# Patient Record
Sex: MALE | Race: WHITE | NOT HISPANIC OR LATINO | Employment: OTHER | URBAN - METROPOLITAN AREA
[De-identification: names, ages, dates, MRNs, and addresses within clinical notes are randomized per-mention and may not be internally consistent; named-entity substitution may affect disease eponyms.]

---

## 2017-04-24 ENCOUNTER — TRANSCRIBE ORDERS (OUTPATIENT)
Dept: ADMINISTRATIVE | Facility: HOSPITAL | Age: 82
End: 2017-04-24

## 2017-04-24 DIAGNOSIS — D75.89 OTHER SPECIFIED DISEASES OF BLOOD AND BLOOD-FORMING ORGANS(289.89): ICD-10-CM

## 2017-04-24 DIAGNOSIS — M81.0 AGE-RELATED OSTEOPOROSIS WITHOUT CURRENT PATHOLOGICAL FRACTURE: ICD-10-CM

## 2017-04-24 DIAGNOSIS — E83.110 HEREDITARY HEMOCHROMATOSIS (HCC): Primary | ICD-10-CM

## 2018-01-15 NOTE — MISCELLANEOUS
Message  GI Reminder Recall Judi Reynolds:   Date: 03/01/2016   Dear Joan Freeman:     Review of our records shows you are due for the following: Colonoscopy  Please call the following office to schedule your appointment:   [facility], [facility], [place], [address] [place] [address]  [phone]  We look forward to hearing from you! Sincerely,         Signatures   Electronically signed by :  Ave Acosta, ; Mar  1 2016 10:13AM EST                       (Author)

## 2021-03-25 ENCOUNTER — APPOINTMENT (OUTPATIENT)
Dept: LAB | Facility: CLINIC | Age: 86
End: 2021-03-25
Payer: COMMERCIAL

## 2021-03-25 ENCOUNTER — TRANSCRIBE ORDERS (OUTPATIENT)
Dept: ADMINISTRATIVE | Facility: HOSPITAL | Age: 86
End: 2021-03-25

## 2021-03-25 DIAGNOSIS — Z00.01 ENCOUNTER FOR GENERAL ADULT MEDICAL EXAMINATION WITH ABNORMAL FINDINGS: ICD-10-CM

## 2021-03-25 DIAGNOSIS — G20 PARKINSON'S DISEASE (HCC): Primary | ICD-10-CM

## 2021-03-25 DIAGNOSIS — G20 PARKINSON'S DISEASE (HCC): ICD-10-CM

## 2021-03-25 LAB
25(OH)D3 SERPL-MCNC: 53.7 NG/ML (ref 30–100)
ALBUMIN SERPL BCP-MCNC: 3.9 G/DL (ref 3.5–5)
ALP SERPL-CCNC: 77 U/L (ref 46–116)
ALT SERPL W P-5'-P-CCNC: 22 U/L (ref 12–78)
ANION GAP SERPL CALCULATED.3IONS-SCNC: 5 MMOL/L (ref 4–13)
AST SERPL W P-5'-P-CCNC: 23 U/L (ref 5–45)
BACTERIA UR QL AUTO: ABNORMAL /HPF
BASOPHILS # BLD AUTO: 0.04 THOUSANDS/ΜL (ref 0–0.1)
BASOPHILS NFR BLD AUTO: 1 % (ref 0–1)
BILIRUB SERPL-MCNC: 0.78 MG/DL (ref 0.2–1)
BILIRUB UR QL STRIP: NEGATIVE
BUN SERPL-MCNC: 19 MG/DL (ref 5–25)
CALCIUM SERPL-MCNC: 8.9 MG/DL (ref 8.3–10.1)
CAOX CRY URNS QL MICRO: ABNORMAL /HPF
CHLORIDE SERPL-SCNC: 106 MMOL/L (ref 100–108)
CHOLEST SERPL-MCNC: 165 MG/DL (ref 50–200)
CLARITY UR: ABNORMAL
CO2 SERPL-SCNC: 28 MMOL/L (ref 21–32)
COLOR UR: YELLOW
CREAT SERPL-MCNC: 0.83 MG/DL (ref 0.6–1.3)
EOSINOPHIL # BLD AUTO: 0.09 THOUSAND/ΜL (ref 0–0.61)
EOSINOPHIL NFR BLD AUTO: 2 % (ref 0–6)
ERYTHROCYTE [DISTWIDTH] IN BLOOD BY AUTOMATED COUNT: 12.2 % (ref 11.6–15.1)
GFR SERPL CREATININE-BSD FRML MDRD: 79 ML/MIN/1.73SQ M
GLUCOSE P FAST SERPL-MCNC: 109 MG/DL (ref 65–99)
GLUCOSE UR STRIP-MCNC: NEGATIVE MG/DL
HCT VFR BLD AUTO: 47.8 % (ref 36.5–49.3)
HDLC SERPL-MCNC: 55 MG/DL
HGB BLD-MCNC: 16.5 G/DL (ref 12–17)
HGB UR QL STRIP.AUTO: NEGATIVE
IMM GRANULOCYTES # BLD AUTO: 0.02 THOUSAND/UL (ref 0–0.2)
IMM GRANULOCYTES NFR BLD AUTO: 0 % (ref 0–2)
KETONES UR STRIP-MCNC: NEGATIVE MG/DL
LDLC SERPL CALC-MCNC: 93 MG/DL (ref 0–100)
LEUKOCYTE ESTERASE UR QL STRIP: NEGATIVE
LYMPHOCYTES # BLD AUTO: 1.54 THOUSANDS/ΜL (ref 0.6–4.47)
LYMPHOCYTES NFR BLD AUTO: 26 % (ref 14–44)
MAGNESIUM SERPL-MCNC: 2.3 MG/DL (ref 1.6–2.6)
MCH RBC QN AUTO: 33.7 PG (ref 26.8–34.3)
MCHC RBC AUTO-ENTMCNC: 34.5 G/DL (ref 31.4–37.4)
MCV RBC AUTO: 98 FL (ref 82–98)
MONOCYTES # BLD AUTO: 0.67 THOUSAND/ΜL (ref 0.17–1.22)
MONOCYTES NFR BLD AUTO: 11 % (ref 4–12)
MUCOUS THREADS UR QL AUTO: ABNORMAL
NEUTROPHILS # BLD AUTO: 3.63 THOUSANDS/ΜL (ref 1.85–7.62)
NEUTS SEG NFR BLD AUTO: 60 % (ref 43–75)
NITRITE UR QL STRIP: NEGATIVE
NON-SQ EPI CELLS URNS QL MICRO: ABNORMAL /HPF
NONHDLC SERPL-MCNC: 110 MG/DL
NRBC BLD AUTO-RTO: 0 /100 WBCS
PH UR STRIP.AUTO: 6 [PH]
PLATELET # BLD AUTO: 228 THOUSANDS/UL (ref 149–390)
PMV BLD AUTO: 10 FL (ref 8.9–12.7)
POTASSIUM SERPL-SCNC: 4.1 MMOL/L (ref 3.5–5.3)
PROT SERPL-MCNC: 7.2 G/DL (ref 6.4–8.2)
PROT UR STRIP-MCNC: NEGATIVE MG/DL
PSA SERPL-MCNC: 2.4 NG/ML (ref 0–4)
PTH-INTACT SERPL-MCNC: 36.3 PG/ML (ref 18.4–80.1)
RBC # BLD AUTO: 4.9 MILLION/UL (ref 3.88–5.62)
RBC #/AREA URNS AUTO: ABNORMAL /HPF
SODIUM SERPL-SCNC: 139 MMOL/L (ref 136–145)
SP GR UR STRIP.AUTO: 1.02 (ref 1–1.03)
TRIGL SERPL-MCNC: 86 MG/DL
TSH SERPL DL<=0.05 MIU/L-ACNC: 2.14 UIU/ML (ref 0.36–3.74)
URATE SERPL-MCNC: 5.5 MG/DL (ref 4.2–8)
UROBILINOGEN UR QL STRIP.AUTO: 0.2 E.U./DL
WBC # BLD AUTO: 5.99 THOUSAND/UL (ref 4.31–10.16)
WBC #/AREA URNS AUTO: ABNORMAL /HPF

## 2021-03-25 PROCEDURE — 81001 URINALYSIS AUTO W/SCOPE: CPT | Performed by: INTERNAL MEDICINE

## 2021-03-25 PROCEDURE — 84550 ASSAY OF BLOOD/URIC ACID: CPT

## 2021-03-25 PROCEDURE — 80053 COMPREHEN METABOLIC PANEL: CPT

## 2021-03-25 PROCEDURE — G0103 PSA SCREENING: HCPCS

## 2021-03-25 PROCEDURE — 85025 COMPLETE CBC W/AUTO DIFF WBC: CPT

## 2021-03-25 PROCEDURE — 80061 LIPID PANEL: CPT

## 2021-03-25 PROCEDURE — 82306 VITAMIN D 25 HYDROXY: CPT

## 2021-03-25 PROCEDURE — 83970 ASSAY OF PARATHORMONE: CPT

## 2021-03-25 PROCEDURE — 84443 ASSAY THYROID STIM HORMONE: CPT

## 2021-03-25 PROCEDURE — 36415 COLL VENOUS BLD VENIPUNCTURE: CPT

## 2021-03-25 PROCEDURE — 83735 ASSAY OF MAGNESIUM: CPT

## 2022-02-11 ENCOUNTER — HOSPITAL ENCOUNTER (EMERGENCY)
Facility: HOSPITAL | Age: 87
Discharge: HOME | End: 2022-02-11
Attending: EMERGENCY MEDICINE
Payer: COMMERCIAL

## 2022-02-11 ENCOUNTER — APPOINTMENT (EMERGENCY)
Dept: RADIOLOGY | Facility: HOSPITAL | Age: 87
End: 2022-02-11
Attending: EMERGENCY MEDICINE
Payer: COMMERCIAL

## 2022-02-11 VITALS
RESPIRATION RATE: 20 BRPM | SYSTOLIC BLOOD PRESSURE: 158 MMHG | DIASTOLIC BLOOD PRESSURE: 77 MMHG | WEIGHT: 141 LBS | HEIGHT: 70 IN | BODY MASS INDEX: 20.19 KG/M2 | HEART RATE: 75 BPM | TEMPERATURE: 98.7 F | OXYGEN SATURATION: 94 %

## 2022-02-11 DIAGNOSIS — R23.0 PERIPHERAL CYANOSIS: ICD-10-CM

## 2022-02-11 DIAGNOSIS — R60.0 EDEMA OF LEFT LOWER EXTREMITY: Primary | ICD-10-CM

## 2022-02-11 DIAGNOSIS — I82.501 CHRONIC DEEP VEIN THROMBOSIS (DVT) OF RIGHT LOWER EXTREMITY (HCC): ICD-10-CM

## 2022-02-11 LAB
ALBUMIN SERPL BCP-MCNC: 3.6 G/DL (ref 3.5–5)
ALP SERPL-CCNC: 73 U/L (ref 46–116)
ALT SERPL W P-5'-P-CCNC: 13 U/L (ref 12–78)
ANION GAP SERPL CALCULATED.3IONS-SCNC: 9 MMOL/L (ref 4–13)
APTT PPP: 35 SECONDS (ref 23–37)
AST SERPL W P-5'-P-CCNC: 20 U/L (ref 5–45)
BASOPHILS # BLD AUTO: 0.04 THOUSANDS/ΜL (ref 0–0.1)
BASOPHILS NFR BLD AUTO: 1 % (ref 0–1)
BILIRUB SERPL-MCNC: 0.7 MG/DL (ref 0.2–1)
BUN SERPL-MCNC: 18 MG/DL (ref 5–25)
CALCIUM SERPL-MCNC: 8.4 MG/DL (ref 8.3–10.1)
CHLORIDE SERPL-SCNC: 103 MMOL/L (ref 100–108)
CO2 SERPL-SCNC: 27 MMOL/L (ref 21–32)
CREAT SERPL-MCNC: 0.82 MG/DL (ref 0.6–1.3)
EOSINOPHIL # BLD AUTO: 0.12 THOUSAND/ΜL (ref 0–0.61)
EOSINOPHIL NFR BLD AUTO: 2 % (ref 0–6)
ERYTHROCYTE [DISTWIDTH] IN BLOOD BY AUTOMATED COUNT: 12.5 % (ref 11.6–15.1)
GFR SERPL CREATININE-BSD FRML MDRD: 78 ML/MIN/1.73SQ M
GLUCOSE SERPL-MCNC: 93 MG/DL (ref 65–140)
HCT VFR BLD AUTO: 45.3 % (ref 36.5–49.3)
HGB BLD-MCNC: 15.5 G/DL (ref 12–17)
IMM GRANULOCYTES # BLD AUTO: 0.02 THOUSAND/UL (ref 0–0.2)
IMM GRANULOCYTES NFR BLD AUTO: 0 % (ref 0–2)
INR PPP: 1.03 (ref 0.84–1.19)
LYMPHOCYTES # BLD AUTO: 1.53 THOUSANDS/ΜL (ref 0.6–4.47)
LYMPHOCYTES NFR BLD AUTO: 26 % (ref 14–44)
MCH RBC QN AUTO: 33.2 PG (ref 26.8–34.3)
MCHC RBC AUTO-ENTMCNC: 34.2 G/DL (ref 31.4–37.4)
MCV RBC AUTO: 97 FL (ref 82–98)
MONOCYTES # BLD AUTO: 0.63 THOUSAND/ΜL (ref 0.17–1.22)
MONOCYTES NFR BLD AUTO: 11 % (ref 4–12)
NEUTROPHILS # BLD AUTO: 3.51 THOUSANDS/ΜL (ref 1.85–7.62)
NEUTS SEG NFR BLD AUTO: 60 % (ref 43–75)
NRBC BLD AUTO-RTO: 0 /100 WBCS
PLATELET # BLD AUTO: 184 THOUSANDS/UL (ref 149–390)
PMV BLD AUTO: 9.5 FL (ref 8.9–12.7)
POTASSIUM SERPL-SCNC: 4.4 MMOL/L (ref 3.5–5.3)
PROT SERPL-MCNC: 7.3 G/DL (ref 6.4–8.2)
PROTHROMBIN TIME: 13.3 SECONDS (ref 11.6–14.5)
RBC # BLD AUTO: 4.67 MILLION/UL (ref 3.88–5.62)
SODIUM SERPL-SCNC: 139 MMOL/L (ref 136–145)
WBC # BLD AUTO: 5.85 THOUSAND/UL (ref 4.31–10.16)

## 2022-02-11 PROCEDURE — 99285 EMERGENCY DEPT VISIT HI MDM: CPT | Performed by: EMERGENCY MEDICINE

## 2022-02-11 PROCEDURE — 36415 COLL VENOUS BLD VENIPUNCTURE: CPT | Performed by: EMERGENCY MEDICINE

## 2022-02-11 PROCEDURE — 93970 EXTREMITY STUDY: CPT

## 2022-02-11 PROCEDURE — 80053 COMPREHEN METABOLIC PANEL: CPT | Performed by: EMERGENCY MEDICINE

## 2022-02-11 PROCEDURE — 85025 COMPLETE CBC W/AUTO DIFF WBC: CPT | Performed by: EMERGENCY MEDICINE

## 2022-02-11 PROCEDURE — 85610 PROTHROMBIN TIME: CPT | Performed by: EMERGENCY MEDICINE

## 2022-02-11 PROCEDURE — 99284 EMERGENCY DEPT VISIT MOD MDM: CPT

## 2022-02-11 PROCEDURE — 85730 THROMBOPLASTIN TIME PARTIAL: CPT | Performed by: EMERGENCY MEDICINE

## 2022-02-11 PROCEDURE — 93970 EXTREMITY STUDY: CPT | Performed by: SURGERY

## 2022-02-11 NOTE — ED PROVIDER NOTES
History  Chief Complaint   Patient presents with    Leg Swelling     L leg sweeling started a week ago  denies any SOB     Patient here with complaint of left lower extremity swelling that began a week ago  He denies shortness of breath or trauma  Denies a history of DVTs  History provided by:  Patient   used: No    Leg Pain  Location:  Leg  Injury: no    Leg location:  L leg  Pain details:     Quality:  Throbbing    Radiates to:  Does not radiate    Severity:  Mild    Onset quality:  Sudden    Timing:  Constant    Progression:  Worsening  Chronicity:  New  Dislocation: no    Foreign body present:  No foreign bodies  Tetanus status:  Unknown  Prior injury to area:  No  Relieved by:  Nothing  Worsened by:  Nothing  Ineffective treatments:  None tried  Associated symptoms: no back pain and no fever        Prior to Admission Medications   Prescriptions Last Dose Informant Patient Reported? Taking?   carbidopa-levodopa (SINEMET)  mg per tablet   Yes Yes   Sig: Take 1 tablet by mouth 4 (four) times a day      Facility-Administered Medications: None       Past Medical History:   Diagnosis Date    Glaucoma     Parkinson disease ([facility] [address] )        History reviewed  No pertinent surgical history  History reviewed  No pertinent family history  I have reviewed and agree with the history as documented  E-Cigarette/Vaping    E-Cigarette Use Never User      E-Cigarette/Vaping Substances     Social History     Tobacco Use    Smoking status: Former Smoker    Smokeless tobacco: Never Used   Vaping Use    Vaping Use: Never used   Substance Use Topics    Alcohol use: Yes    Drug use: Never       Review of Systems   Constitutional: Negative for chills and fever  Respiratory: Negative for cough, shortness of breath and wheezing  Cardiovascular: Negative for chest pain and palpitations  Gastrointestinal: Negative for abdominal pain, constipation, diarrhea, nausea and vomiting  Genitourinary: Negative for dysuria, flank pain, hematuria and urgency  Musculoskeletal: Positive for gait problem  Negative for back pain, joint swelling and myalgias  Skin: Positive for color change and pallor  Negative for rash  All other systems reviewed and are negative  Physical Exam              Physical Exam  Vitals and nursing note reviewed  Constitutional:       Appearance: Normal appearance  He is well-developed  HENT:      Head: Normocephalic and atraumatic  Eyes:      Pupils: Pupils are equal, round, and reactive to light  Cardiovascular:      Rate and Rhythm: Normal rate and regular rhythm  Heart sounds: Normal heart sounds  Pulmonary:      Effort: Pulmonary effort is normal       Breath sounds: Normal breath sounds  Abdominal:      General: Bowel sounds are normal  There is no distension  Palpations: Abdomen is soft  There is no mass  Tenderness: There is no abdominal tenderness  There is no guarding or rebound  Musculoskeletal:         General: No swelling, tenderness, deformity or signs of injury  Cervical back: Normal range of motion and neck supple  Right lower leg: Normal       Left lower leg: No deformity, lacerations or tenderness  3+ Pitting Edema present  Legs:    Skin:     General: Skin is warm and dry  Capillary Refill: Capillary refill takes less than 2 seconds  Coloration: Skin is cyanotic  Skin is not ashen, jaundiced, mottled, pale or sallow  Neurological:      General: No focal deficit present  Mental Status: He is alert and oriented to person, place, and time  Psychiatric:         Behavior: Behavior normal          Thought Content:  Thought content normal          Judgment: Judgment normal          Vital Signs  ED Triage Vitals   Temperature Pulse Respirations Blood Pressure SpO2   02/11/22 1122 02/11/22 1122 02/11/22 1122 02/11/22 1122 02/11/22 1125   (!) 97 °F (36 1 °C) 71 20 161/71 96 %      Temp Source Heart Rate Source Patient Position - Orthostatic VS BP Location FiO2 (%)   02/11/22 1122 02/11/22 1122 02/11/22 1122 02/11/22 1122 --   Temporal Monitor Sitting Left arm       Pain Score       02/11/22 1122       3           Vitals:    02/11/22 1122 02/11/22 1510   BP: 161/71 158/77   Pulse: 71 75   Patient Position - Orthostatic VS: Sitting Sitting         Visual Acuity  Visual Acuity      Most Recent Value   L Pupil Size (mm) 3   R Pupil Size (mm) 3          ED Medications  Medications - No data to display    Diagnostic Studies  Results Reviewed     Procedure Component Value Units Date/Time    Comprehensive metabolic panel [[id number]] Collected: 02/11/22 1229    Lab Status: Final result Specimen: Blood from Arm, Left Updated: 02/11/22 1252     Sodium 139 mmol/L      Potassium 4 4 mmol/L      Chloride 103 mmol/L      CO2 27 mmol/L      ANION GAP 9 mmol/L      BUN 18 mg/dL      Creatinine 0 82 mg/dL      Glucose 93 mg/dL      Calcium 8 4 mg/dL      AST 20 U/L      ALT 13 U/L      Alkaline Phosphatase 73 U/L      Total Protein 7 3 g/dL      Albumin 3 6 g/dL      Total Bilirubin 0 70 mg/dL      eGFR 78 ml/min/1 73sq m     Narrative:      [place] guidelines for Chronic Kidney Disease (CKD):     Stage 1 with normal or high GFR (GFR > 90 mL/min/1 73 square meters)    Stage 2 Mild CKD (GFR = 60-89 mL/min/1 73 square meters)    Stage 3A Moderate CKD (GFR = 45-59 mL/min/1 73 square meters)    Stage 3B Moderate CKD (GFR = 30-44 mL/min/1 73 square meters)    Stage 4 Severe CKD (GFR = 15-29 mL/min/1 73 square meters)    Stage 5 End Stage CKD (GFR <15 mL/min/1 73 square meters)  Note: GFR calculation is accurate only with a steady state creatinine    Protime-INR [[id number]]  (Normal) Collected: 02/11/22 1229    Lab Status: Final result Specimen: Blood from Arm, Left Updated: 02/11/22 1249     Protime 13 3 seconds      INR 1 03    APTT [[id number]]  (Normal) Collected: 02/11/22 1229    Lab Status: Final result Specimen: Blood from Arm, Left Updated: 02/11/22 1249     PTT 35 seconds     CBC and differential [[id number]] Collected: 02/11/22 1229    Lab Status: Final result Specimen: Blood from Arm, Left Updated: 02/11/22 1235     WBC 5 85 Thousand/uL      RBC 4 67 Million/uL      Hemoglobin 15 5 g/dL      Hematocrit 45 3 %      MCV 97 fL      MCH 33 2 pg      MCHC 34 2 g/dL      RDW 12 5 %      MPV 9 5 fL      Platelets 995 Thousands/uL      nRBC 0 /100 WBCs      Neutrophils Relative 60 %      Immat GRANS % 0 %      Lymphocytes Relative 26 %      Monocytes Relative 11 %      Eosinophils Relative 2 %      Basophils Relative 1 %      Neutrophils Absolute 3 51 Thousands/µL      Immature Grans Absolute 0 02 Thousand/uL      Lymphocytes Absolute 1 53 Thousands/µL      Monocytes Absolute 0 63 Thousand/µL      Eosinophils Absolute 0 12 Thousand/µL      Basophils Absolute 0 04 Thousands/µL                  VAS lower limb venous duplex study, complete bilateral   Final Result by Tommy Singh MD (02/11 2030)                 Procedures  Procedures         ED Course  ED Course as of 02/13/22 1608   Fri Feb 11, 2022   1358 Preliminary vascular study negative for DVT left lower extremity  Chronic DVTs in right lower extremity  SBIRT 22yo+      Most Recent Value   SBIRT (24 yo +)    In order to provide better care to our patients, we are screening all of our patients for alcohol and drug use  Would it be okay to ask you these screening questions? No Filed at: 02/11/2022 1139                    MDM  Number of Diagnoses or Management Options  Chronic deep vein thrombosis (DVT) of right lower extremity ([address] ): new and requires workup  Edema of left lower extremity: new and requires workup  Peripheral cyanosis: new and requires workup  Diagnosis management comments: Pt in the ER with c/o LLE edema and cyanosis  Venous doppler neg for DVT in LLE, positive for chronic DVT on right   I reviewed pt with vasc surg PA on call, Toney Curtis  She states that vasc surgeon does not recommend anticoag at this time  Pt will f/u with vasc after discharge  He will return to the ER for further concerns  Amount and/or Complexity of Data Reviewed  Clinical lab tests: ordered and reviewed  Tests in the radiology section of CPT®: ordered and reviewed    Risk of Complications, Morbidity, and/or Mortality  Presenting problems: high  Diagnostic procedures: high  Management options: high    Patient Progress  Patient progress: stable      Disposition  Final diagnoses:   Edema of left lower extremity   Peripheral cyanosis   Chronic deep vein thrombosis (DVT) of right lower extremity ([address] )     Time reflects when diagnosis was documented in both MDM as applicable and the Disposition within this note     Time User Action Codes Description Comment    2/11/2022  2:01 PM Sofía Walters O Add [R60 0] Edema of left lower extremity     2/11/2022  2:01 PM Sofía Walters O Add [R23 0] Peripheral cyanosis     2/11/2022  2:01 PM Sofía Walters Add [I82 501] Chronic deep vein thrombosis (DVT) of right lower extremity [facility])       ED Disposition     ED Disposition Condition Date/Time Comment    Discharge Stable Fri Feb 11, 2022  2:51 PM [place] discharge to home/self care  Follow-up Information     Follow up With Specialties Details Why Contact Info Additional Information    Gloria Jones MD Internal Medicine Schedule an appointment as soon as possible for a visit in 2 days for follow up Bari Silva [address] [place] [id number]  [phone]       [facility] Vascular Surgery Schedule an appointment as soon as possible for a visit in 2 days for follow up [address] [facility] [address] [facility] [address] [phone] The [address] [place] [address], One [address], [address] [phone]          Discharge Medication List as of 2/11/2022  2:52 PM      CONTINUE these medications which have NOT CHANGED    Details   carbidopa-levodopa (SINEMET)  mg per tablet Take 1 tablet by mouth 4 (four) times a day, Starting Mon 5/17/2021, Until Tue 5/17/2022, Historical Med             No discharge procedures on file      PDMP Review     None          ED Provider  Electronically Signed by           Candido Darby DO  02/13/22 7834

## 2022-02-11 NOTE — ED NOTES
Called Vascular to make sure they saw the order for this patient       Shayy Gonzalez RN  02/11/22 3314

## 2022-02-11 NOTE — DISCHARGE INSTRUCTIONS
Return to the ER for further concerns or worsening symptoms  Follow up with your primary care physician and vascular center in 1-2 days

## 2022-02-18 ENCOUNTER — OFFICE VISIT (OUTPATIENT)
Dept: VASCULAR SURGERY | Facility: CLINIC | Age: 87
End: 2022-02-18
Payer: COMMERCIAL

## 2022-02-18 VITALS
SYSTOLIC BLOOD PRESSURE: 122 MMHG | BODY MASS INDEX: 19.23 KG/M2 | HEART RATE: 68 BPM | DIASTOLIC BLOOD PRESSURE: 72 MMHG | HEIGHT: 71 IN | WEIGHT: 137.4 LBS

## 2022-02-18 DIAGNOSIS — I82.551 CHRONIC DEEP VEIN THROMBOSIS (DVT) OF RIGHT PERONEAL VEIN (HCC): ICD-10-CM

## 2022-02-18 DIAGNOSIS — I87.2 VENOUS INSUFFICIENCY OF BOTH LOWER EXTREMITIES: Primary | ICD-10-CM

## 2022-02-18 DIAGNOSIS — I73.9 PERIPHERAL VASCULAR DISEASE, UNSPECIFIED (HCC): ICD-10-CM

## 2022-02-18 PROBLEM — I82.509 CHRONIC DEEP VEIN THROMBOSIS (DVT) (HCC): Status: ACTIVE | Noted: 2022-02-18

## 2022-02-18 PROCEDURE — 99204 OFFICE O/P NEW MOD 45 MIN: CPT | Performed by: PHYSICIAN ASSISTANT

## 2022-02-18 RX ORDER — DORZOLAMIDE HCL 20 MG/ML
SOLUTION/ DROPS OPHTHALMIC
COMMUNITY
Start: 2022-01-25

## 2022-02-18 RX ORDER — PRAMIPEXOLE DIHYDROCHLORIDE 0.12 MG/1
TABLET ORAL
COMMUNITY
Start: 2021-11-10

## 2022-02-18 RX ORDER — TAMSULOSIN HYDROCHLORIDE 0.4 MG/1
0.4 CAPSULE ORAL
COMMUNITY
Start: 2021-11-17 | End: 2022-11-17

## 2022-02-18 NOTE — PROGRESS NOTES
Assessment/Plan:    Bilateral lower extremity edema L>R    3 weeks of marked LE edema   Reviewed ED notes 2/11   Suspect bilat LE edema due to underlying venous disease     Exam:      -scattered LE bulging, varicose veins     -R 1+ pedal, ankle edema; L 2 + pedal and ankle edema, 1+ LE edema     -Purple discoloration of feet which improve/ resolve on elevation     -feet are warm, well perfused, DP pulses     LEV 2/11/22: RLE chronic DVT 1/2 peroneal veins  LLE no DVT  Triphasic waveforms bilaterally    Plan: We had a detailed discussion regarding his history and symptoms  Patient with varicose veins and more recently less active with Parkinson's  He has no known cardiovascular or renal disease  His LE edema and discoloration of the feet are consistent with venous disease  Arterial examination is grossly normal  Recommend conservative measures with compression, periodic elevation of legs, low Na diet and activity as tolerated  - Order for prescription graded compression stockings of 20-30 mm Hg  - Patient education for venous insufficiency  - Follow up as needed for any worsening of symptoms      Chronic RLE DVT   discovered on venous duplex   Patient had no known hx of DVT   No indication for anticoagulation at this point      Subjective:      Patient ID: Sukumar Perry is a 80 y o  male  Pt is new and was referred by Claude Daring, DO for leg swelling and to review LEV 2/11/2022  Pt was at [facility] ED 2/11/2022  Pt c/oedema and discoloration of feet for 3 weeks  He denies tiredness and heaviness in leg  HPI    Mr Jolayne Fleischer 80year-old M recently at [facility] for evaluation of left lower extremity edema  He underwent a venous duplex which showed chronic DVT of the right lower extremity  Case was discussed with the vascular team at that time and anticoagulation was not recommended due to chronic nature of DVT    Patient now comes in to the vascular clinic for further evaluation  Mr Estrellita Cerda is concerned for bilateral L>R LE /ankle/pedal edema with purple discoloration which began 3 weeks ago  He also has some numbness to the plantar aspect of the feet  He has no calf pain  No coldness in the feet  No claudication, ischemic rest pain or wounds  He denies chronic history of LE edema; though, on further discussion, he has had swelling in the past which he attributes to medications  He had no known history of DVT  In the past year, he started treatment for Parkinson's and he is now less active  He ambulates with a cane and has had no change in walking  He has no CP or SOB  LEV 2/11/22  [facility] REPORT  CLINICAL:  Indications: Patient presents with Left lower extremity pain and swelling x  several days  Risk Factors  The patient has history of previous smoking (quit >10yrs ago)  He has no  history of DVT  FINDINGS:     Right     Impression                             Peroneal  E1  Non Occlusive Thrombus (Chronic)             CONCLUSION:  Impression:  RIGHT LOWER LIMB:  Evidence of  chronic deep vein thrombosis in one peroneal vein  No evidence of superficial thrombophlebitis noted  Doppler evaluation shows a normal response to augmentation maneuvers  Popliteal, posterior tibial and anterior tibial arterial Doppler waveforms are  triphasic  LEFT LOWER LIMB:  No evidence of acute or chronic deep vein thrombosis  No evidence of superficial thrombophlebitis noted  Doppler evaluation shows a normal response to augmentation maneuvers  Popliteal, posterior tibial and anterior tibial arterial Doppler waveforms are  Triphasic  The following portions of the patient's history were reviewed and updated as appropriate: allergies, current medications, past family history, past medical history, past social history, past surgical history and problem list     Review of Systems   Constitutional: Negative  HENT: Negative  Eyes: Negative      Respiratory: Negative  Cardiovascular: Positive for leg swelling (left leg swelling )  Gastrointestinal: Negative  Endocrine: Negative  Genitourinary: Negative  Musculoskeletal: Negative  Skin: Positive for color change (redness in left foot)  Allergic/Immunologic: Negative  Neurological: Negative  Hematological: Negative  Psychiatric/Behavioral: Negative  Objective:      /72 (BP Location: Right arm, Patient Position: Sitting, Cuff Size: Standard)   Pulse 68   Ht 5' 10 5" (1 791 m)   Wt 62 3 kg (137 lb 6 4 oz)   BMI 19 44 kg/m²            -scattered LE bulging, varicose veins in the medial lower legs     -R 1+ pedal, ankle edema; L 2 + pedal and ankle edema, 1+ LE edema     -Purple discoloration of feet which improve/ resolve on elevation     -feet are warm, well perfused, DP pulses       Physical Exam  Vitals and nursing note reviewed  Constitutional:       Appearance: He is well-developed  Comments: Elderly gentleman  Uses cane to walk   HENT:      Head: Normocephalic and atraumatic  Eyes:      Pupils: Pupils are equal, round, and reactive to light  Neck:      Thyroid: No thyromegaly  Vascular: No JVD  Trachea: Trachea normal    Cardiovascular:      Rate and Rhythm: Normal rate and regular rhythm  Pulses:           Carotid pulses are 2+ on the right side and 2+ on the left side  Radial pulses are 2+ on the right side and 2+ on the left side  Femoral pulses are 2+ on the right side and 2+ on the left side  Dorsalis pedis pulses are 2+ on the right side and 2+ on the left side  Heart sounds: S1 normal and S2 normal  Murmur heard  Systolic murmur is present with a grade of 2/6  No friction rub  No gallop  Pulmonary:      Effort: Pulmonary effort is normal  No accessory muscle usage or respiratory distress  Breath sounds: Normal breath sounds  No wheezing or rales     Abdominal:      General: Bowel sounds are normal  There is no distension  Palpations: Abdomen is soft  Tenderness: There is no abdominal tenderness  Musculoskeletal:         General: No deformity  Normal range of motion  Cervical back: Neck supple  Right lower leg: Edema present  Left lower leg: Edema present  Skin:     General: Skin is warm and dry  Findings: No lesion or rash  Nails: There is no clubbing  Neurological:      Mental Status: He is alert and oriented to person, place, and time  Comments: Grossly normal    Psychiatric:         Behavior: Behavior is cooperative  I have reviewed and made appropriate changes to the review of systems input by the medical assistant  Vitals:    02/18/22 1520   BP: 122/72   BP Location: Right arm   Patient Position: Sitting   Cuff Size: Standard   Pulse: 68   Weight: 62 3 kg (137 lb 6 4 oz)   Height: 5' 10 5" (1 791 m)       There is no problem list on file for this patient  No past surgical history on file  No family history on file  Social History     Socioeconomic History    Marital status: /Civil Union     Spouse name: Not on file    Number of children: Not on file    Years of education: Not on file    Highest education level: Not on file   Occupational History    Not on file   Tobacco Use    Smoking status: Former Smoker    Smokeless tobacco: Never Used   Vaping Use    Vaping Use: Never used   Substance and Sexual Activity    Alcohol use:  Yes    Drug use: Never    Sexual activity: Not on file   Other Topics Concern    Not on file   Social History Narrative    Not on file     Social Determinants of Health     Financial Resource Strain: Not on file   Food Insecurity: Not on file   Transportation Needs: Not on file   Physical Activity: Not on file   Stress: Not on file   Social Connections: Not on file   Intimate Partner Violence: Not on file   Housing Stability: Not on file       No Known Allergies      Current Outpatient Medications:   carbidopa-levodopa (SINEMET)  mg per tablet, Take 1 tablet by mouth 4 (four) times a day, Disp: , Rfl:     dorzolamide (TRUSOPT) 2 % ophthalmic solution, , Disp: , Rfl:     pramipexole (MIRAPEX) 0 125 mg tablet, TAKE ONE TABLET BY MOUTH THREE TIMES A DAY (GENERIC FOR MIRAPLEX), Disp: , Rfl:     tamsulosin (FLOMAX) 0 4 mg, Take 0 4 mg by mouth, Disp: , Rfl:

## 2022-02-18 NOTE — PATIENT INSTRUCTIONS
Bilateral lower extremity edema L>R   -varicose veins  -bilateral varicose veins  -feet are warm, well perfused, DP pulses    Plan:  - Order for prescription graded compression stockings of 20-30 mm Hg  - Wear stocking EVERY day to help control swelling in legs and remove at night  - Elevate legs while at rest  - Continue healthy life-style changes; heart-healthy, low sodium diet; regular exercise for weight loss  - Patient education for venous insufficiency  - Follow up as needed for any worsening of symptoms - swelling, pain, fatigue, aching, heaviness

## 2022-11-07 ENCOUNTER — APPOINTMENT (OUTPATIENT)
Dept: LAB | Facility: CLINIC | Age: 87
End: 2022-11-07

## 2022-11-07 DIAGNOSIS — R73.01 IMPAIRED FASTING GLUCOSE: ICD-10-CM

## 2022-11-07 DIAGNOSIS — N13.8 ENLARGED PROSTATE WITH URINARY OBSTRUCTION: ICD-10-CM

## 2022-11-07 DIAGNOSIS — R35.1 NOCTURIA: ICD-10-CM

## 2022-11-07 DIAGNOSIS — N40.1 ENLARGED PROSTATE WITH URINARY OBSTRUCTION: ICD-10-CM

## 2022-11-07 LAB
ALBUMIN SERPL BCP-MCNC: 3.3 G/DL (ref 3.5–5)
ALP SERPL-CCNC: 67 U/L (ref 46–116)
ALT SERPL W P-5'-P-CCNC: 15 U/L (ref 12–78)
ANION GAP SERPL CALCULATED.3IONS-SCNC: 3 MMOL/L (ref 4–13)
AST SERPL W P-5'-P-CCNC: 15 U/L (ref 5–45)
BASOPHILS # BLD AUTO: 0.05 THOUSANDS/ÂΜL (ref 0–0.1)
BASOPHILS NFR BLD AUTO: 1 % (ref 0–1)
BILIRUB SERPL-MCNC: 0.81 MG/DL (ref 0.2–1)
BUN SERPL-MCNC: 16 MG/DL (ref 5–25)
CALCIUM ALBUM COR SERPL-MCNC: 9.6 MG/DL (ref 8.3–10.1)
CALCIUM SERPL-MCNC: 9 MG/DL (ref 8.3–10.1)
CHLORIDE SERPL-SCNC: 106 MMOL/L (ref 96–108)
CO2 SERPL-SCNC: 28 MMOL/L (ref 21–32)
CREAT SERPL-MCNC: 0.86 MG/DL (ref 0.6–1.3)
EOSINOPHIL # BLD AUTO: 0.22 THOUSAND/ÂΜL (ref 0–0.61)
EOSINOPHIL NFR BLD AUTO: 3 % (ref 0–6)
ERYTHROCYTE [DISTWIDTH] IN BLOOD BY AUTOMATED COUNT: 12.6 % (ref 11.6–15.1)
EST. AVERAGE GLUCOSE BLD GHB EST-MCNC: 114 MG/DL
GFR SERPL CREATININE-BSD FRML MDRD: 76 ML/MIN/1.73SQ M
GLUCOSE P FAST SERPL-MCNC: 115 MG/DL (ref 65–99)
HBA1C MFR BLD: 5.6 %
HCT VFR BLD AUTO: 45.6 % (ref 36.5–49.3)
HGB BLD-MCNC: 15.6 G/DL (ref 12–17)
IMM GRANULOCYTES # BLD AUTO: 0.02 THOUSAND/UL (ref 0–0.2)
IMM GRANULOCYTES NFR BLD AUTO: 0 % (ref 0–2)
LYMPHOCYTES # BLD AUTO: 1.85 THOUSANDS/ÂΜL (ref 0.6–4.47)
LYMPHOCYTES NFR BLD AUTO: 27 % (ref 14–44)
MAGNESIUM SERPL-MCNC: 2 MG/DL (ref 1.6–2.6)
MCH RBC QN AUTO: 33.6 PG (ref 26.8–34.3)
MCHC RBC AUTO-ENTMCNC: 34.2 G/DL (ref 31.4–37.4)
MCV RBC AUTO: 98 FL (ref 82–98)
MONOCYTES # BLD AUTO: 0.71 THOUSAND/ÂΜL (ref 0.17–1.22)
MONOCYTES NFR BLD AUTO: 10 % (ref 4–12)
NEUTROPHILS # BLD AUTO: 4.05 THOUSANDS/ÂΜL (ref 1.85–7.62)
NEUTS SEG NFR BLD AUTO: 59 % (ref 43–75)
NRBC BLD AUTO-RTO: 0 /100 WBCS
PLATELET # BLD AUTO: 219 THOUSANDS/UL (ref 149–390)
PMV BLD AUTO: 9.7 FL (ref 8.9–12.7)
POTASSIUM SERPL-SCNC: 4.2 MMOL/L (ref 3.5–5.3)
PROT SERPL-MCNC: 7.1 G/DL (ref 6.4–8.4)
RBC # BLD AUTO: 4.64 MILLION/UL (ref 3.88–5.62)
SODIUM SERPL-SCNC: 137 MMOL/L (ref 135–147)
WBC # BLD AUTO: 6.9 THOUSAND/UL (ref 4.31–10.16)

## 2024-02-07 ENCOUNTER — APPOINTMENT (OUTPATIENT)
Dept: LAB | Facility: CLINIC | Age: 89
End: 2024-02-07
Payer: COMMERCIAL

## 2024-02-07 DIAGNOSIS — Z12.5 ENCOUNTER FOR SCREENING FOR MALIGNANT NEOPLASM OF PROSTATE: ICD-10-CM

## 2024-02-07 DIAGNOSIS — G20.A1 PARKINSON'S DISEASE WITHOUT DYSKINESIA, WITHOUT MENTION OF FLUCTUATIONS: ICD-10-CM

## 2024-02-07 DIAGNOSIS — R19.7 DIARRHEA, UNSPECIFIED: ICD-10-CM

## 2024-02-07 DIAGNOSIS — N40.1 BENIGN PROSTATIC HYPERPLASIA WITH LOWER URINARY TRACT SYMPTOMS: ICD-10-CM

## 2024-02-07 DIAGNOSIS — R79.89 OTHER SPECIFIED ABNORMAL FINDINGS OF BLOOD CHEMISTRY: ICD-10-CM

## 2024-02-07 DIAGNOSIS — R35.0 FREQUENCY OF MICTURITION: ICD-10-CM

## 2024-02-07 LAB
ALBUMIN SERPL BCP-MCNC: 3.8 G/DL (ref 3.5–5)
ALP SERPL-CCNC: 55 U/L (ref 34–104)
ALT SERPL W P-5'-P-CCNC: 4 U/L (ref 7–52)
ANION GAP SERPL CALCULATED.3IONS-SCNC: 10 MMOL/L
AST SERPL W P-5'-P-CCNC: 27 U/L (ref 13–39)
BACTERIA UR QL AUTO: ABNORMAL /HPF
BASOPHILS # BLD AUTO: 0.02 THOUSANDS/ÂΜL (ref 0–0.1)
BASOPHILS NFR BLD AUTO: 0 % (ref 0–1)
BILIRUB SERPL-MCNC: 1.13 MG/DL (ref 0.2–1)
BILIRUB UR QL STRIP: NEGATIVE
BUN SERPL-MCNC: 21 MG/DL (ref 5–25)
CALCIUM SERPL-MCNC: 8.8 MG/DL (ref 8.4–10.2)
CAOX CRY URNS QL MICRO: ABNORMAL /HPF
CHLORIDE SERPL-SCNC: 103 MMOL/L (ref 96–108)
CHOLEST SERPL-MCNC: 124 MG/DL
CLARITY UR: CLEAR
CO2 SERPL-SCNC: 26 MMOL/L (ref 21–32)
COLOR UR: YELLOW
CREAT SERPL-MCNC: 0.8 MG/DL (ref 0.6–1.3)
EOSINOPHIL # BLD AUTO: 0.09 THOUSAND/ÂΜL (ref 0–0.61)
EOSINOPHIL NFR BLD AUTO: 2 % (ref 0–6)
ERYTHROCYTE [DISTWIDTH] IN BLOOD BY AUTOMATED COUNT: 12 % (ref 11.6–15.1)
GFR SERPL CREATININE-BSD FRML MDRD: 78 ML/MIN/1.73SQ M
GLUCOSE P FAST SERPL-MCNC: 92 MG/DL (ref 65–99)
GLUCOSE UR STRIP-MCNC: NEGATIVE MG/DL
HCT VFR BLD AUTO: 42.3 % (ref 36.5–49.3)
HDLC SERPL-MCNC: 46 MG/DL
HGB BLD-MCNC: 15.2 G/DL (ref 12–17)
HGB UR QL STRIP.AUTO: NEGATIVE
IMM GRANULOCYTES # BLD AUTO: 0.03 THOUSAND/UL (ref 0–0.2)
IMM GRANULOCYTES NFR BLD AUTO: 1 % (ref 0–2)
KETONES UR STRIP-MCNC: NEGATIVE MG/DL
LDLC SERPL CALC-MCNC: 63 MG/DL (ref 0–100)
LEUKOCYTE ESTERASE UR QL STRIP: NEGATIVE
LYMPHOCYTES # BLD AUTO: 1.14 THOUSANDS/ÂΜL (ref 0.6–4.47)
LYMPHOCYTES NFR BLD AUTO: 20 % (ref 14–44)
MAGNESIUM SERPL-MCNC: 1.7 MG/DL (ref 1.9–2.7)
MCH RBC QN AUTO: 34 PG (ref 26.8–34.3)
MCHC RBC AUTO-ENTMCNC: 35.9 G/DL (ref 31.4–37.4)
MCV RBC AUTO: 95 FL (ref 82–98)
MONOCYTES # BLD AUTO: 0.83 THOUSAND/ÂΜL (ref 0.17–1.22)
MONOCYTES NFR BLD AUTO: 15 % (ref 4–12)
MUCOUS THREADS UR QL AUTO: ABNORMAL
NEUTROPHILS # BLD AUTO: 3.57 THOUSANDS/ÂΜL (ref 1.85–7.62)
NEUTS SEG NFR BLD AUTO: 62 % (ref 43–75)
NITRITE UR QL STRIP: NEGATIVE
NON-SQ EPI CELLS URNS QL MICRO: ABNORMAL /HPF
NONHDLC SERPL-MCNC: 78 MG/DL
NRBC BLD AUTO-RTO: 0 /100 WBCS
PH UR STRIP.AUTO: 6 [PH]
PLATELET # BLD AUTO: 166 THOUSANDS/UL (ref 149–390)
PMV BLD AUTO: 9.8 FL (ref 8.9–12.7)
POTASSIUM SERPL-SCNC: 4 MMOL/L (ref 3.5–5.3)
PROT SERPL-MCNC: 6.4 G/DL (ref 6.4–8.4)
PROT UR STRIP-MCNC: ABNORMAL MG/DL
PSA SERPL-MCNC: 1.88 NG/ML (ref 0–4)
RBC # BLD AUTO: 4.47 MILLION/UL (ref 3.88–5.62)
RBC #/AREA URNS AUTO: ABNORMAL /HPF
SODIUM SERPL-SCNC: 139 MMOL/L (ref 135–147)
SP GR UR STRIP.AUTO: 1.02 (ref 1–1.03)
TRIGL SERPL-MCNC: 75 MG/DL
TSH SERPL DL<=0.05 MIU/L-ACNC: 2.16 UIU/ML (ref 0.45–4.5)
UROBILINOGEN UR STRIP-ACNC: <2 MG/DL
WBC # BLD AUTO: 5.68 THOUSAND/UL (ref 4.31–10.16)
WBC #/AREA URNS AUTO: ABNORMAL /HPF

## 2024-02-07 PROCEDURE — G0103 PSA SCREENING: HCPCS

## 2024-02-07 PROCEDURE — 84443 ASSAY THYROID STIM HORMONE: CPT

## 2024-02-07 PROCEDURE — 36415 COLL VENOUS BLD VENIPUNCTURE: CPT

## 2024-02-07 PROCEDURE — 83735 ASSAY OF MAGNESIUM: CPT

## 2024-02-07 PROCEDURE — 87086 URINE CULTURE/COLONY COUNT: CPT

## 2024-02-07 PROCEDURE — 80053 COMPREHEN METABOLIC PANEL: CPT

## 2024-02-07 PROCEDURE — 85025 COMPLETE CBC W/AUTO DIFF WBC: CPT

## 2024-02-07 PROCEDURE — 81001 URINALYSIS AUTO W/SCOPE: CPT

## 2024-02-07 PROCEDURE — 80061 LIPID PANEL: CPT

## 2024-02-08 LAB — BACTERIA UR CULT: NORMAL

## 2024-02-09 ENCOUNTER — APPOINTMENT (OUTPATIENT)
Dept: LAB | Facility: CLINIC | Age: 89
End: 2024-02-09
Payer: COMMERCIAL

## 2024-02-09 PROCEDURE — 87209 SMEAR COMPLEX STAIN: CPT

## 2024-02-09 PROCEDURE — 87329 GIARDIA AG IA: CPT

## 2024-02-09 PROCEDURE — 87177 OVA AND PARASITES SMEARS: CPT

## 2024-02-09 PROCEDURE — 87505 NFCT AGENT DETECTION GI: CPT

## 2024-02-09 PROCEDURE — 87493 C DIFF AMPLIFIED PROBE: CPT

## 2024-02-10 LAB
C COLI+JEJUNI TUF STL QL NAA+PROBE: POSITIVE
C DIFF TOX GENS STL QL NAA+PROBE: NEGATIVE
EC STX1+STX2 GENES STL QL NAA+PROBE: NEGATIVE
G LAMBLIA AG STL QL IA: NEGATIVE
SALMONELLA SP SPAO STL QL NAA+PROBE: NEGATIVE
SHIGELLA SP+EIEC IPAH STL QL NAA+PROBE: NEGATIVE

## 2024-07-11 ENCOUNTER — APPOINTMENT (EMERGENCY)
Dept: RADIOLOGY | Facility: HOSPITAL | Age: 89
DRG: 871 | End: 2024-07-11
Payer: COMMERCIAL

## 2024-07-11 ENCOUNTER — APPOINTMENT (INPATIENT)
Dept: RADIOLOGY | Facility: HOSPITAL | Age: 89
DRG: 871 | End: 2024-07-11
Attending: RADIOLOGY
Payer: COMMERCIAL

## 2024-07-11 ENCOUNTER — RA CDI HCC (OUTPATIENT)
Dept: OTHER | Facility: HOSPITAL | Age: 89
End: 2024-07-11

## 2024-07-11 ENCOUNTER — HOSPITAL ENCOUNTER (INPATIENT)
Facility: HOSPITAL | Age: 89
LOS: 5 days | Discharge: NURSING, REHAB OR LONG-TERM CARE | DRG: 871 | End: 2024-07-16
Attending: EMERGENCY MEDICINE | Admitting: SPECIALIST
Payer: COMMERCIAL

## 2024-07-11 DIAGNOSIS — E44.0 MODERATE PROTEIN-CALORIE MALNUTRITION (HCC): ICD-10-CM

## 2024-07-11 DIAGNOSIS — K82.A2 CHOLECYSTITIS WITH PERFORATION OF GALLBLADDER: Primary | ICD-10-CM

## 2024-07-11 DIAGNOSIS — K65.3: ICD-10-CM

## 2024-07-11 DIAGNOSIS — R10.9 ABDOMINAL PAIN: ICD-10-CM

## 2024-07-11 LAB
ALBUMIN SERPL BCG-MCNC: 2.9 G/DL (ref 3.5–5)
ALP SERPL-CCNC: 53 U/L (ref 34–104)
ALT SERPL W P-5'-P-CCNC: <3 U/L (ref 7–52)
ANION GAP SERPL CALCULATED.3IONS-SCNC: 9 MMOL/L (ref 4–13)
AST SERPL W P-5'-P-CCNC: 19 U/L (ref 13–39)
BACTERIA UR QL AUTO: ABNORMAL /HPF
BASOPHILS # BLD MANUAL: 0 THOUSAND/UL (ref 0–0.1)
BASOPHILS NFR MAR MANUAL: 0 % (ref 0–1)
BILIRUB SERPL-MCNC: 1.11 MG/DL (ref 0.2–1)
BILIRUB UR QL STRIP: NEGATIVE
BUN SERPL-MCNC: 70 MG/DL (ref 5–25)
BURR CELLS BLD QL SMEAR: PRESENT
CALCIUM ALBUM COR SERPL-MCNC: 10.3 MG/DL (ref 8.3–10.1)
CALCIUM SERPL-MCNC: 9.4 MG/DL (ref 8.4–10.2)
CHLORIDE SERPL-SCNC: 102 MMOL/L (ref 96–108)
CLARITY UR: CLEAR
CO2 SERPL-SCNC: 25 MMOL/L (ref 21–32)
COLOR UR: YELLOW
CREAT SERPL-MCNC: 1.02 MG/DL (ref 0.6–1.3)
DOHLE BOD BLD QL SMEAR: PRESENT
EOSINOPHIL # BLD MANUAL: 0 THOUSAND/UL (ref 0–0.4)
EOSINOPHIL NFR BLD MANUAL: 0 % (ref 0–6)
ERYTHROCYTE [DISTWIDTH] IN BLOOD BY AUTOMATED COUNT: 13.1 % (ref 11.6–15.1)
GFR SERPL CREATININE-BSD FRML MDRD: 63 ML/MIN/1.73SQ M
GLUCOSE SERPL-MCNC: 114 MG/DL (ref 65–140)
GLUCOSE UR STRIP-MCNC: NEGATIVE MG/DL
HCT VFR BLD AUTO: 41.3 % (ref 36.5–49.3)
HGB BLD-MCNC: 14.3 G/DL (ref 12–17)
HGB UR QL STRIP.AUTO: ABNORMAL
HYALINE CASTS #/AREA URNS LPF: ABNORMAL /LPF
KETONES UR STRIP-MCNC: NEGATIVE MG/DL
LEUKOCYTE ESTERASE UR QL STRIP: ABNORMAL
LIPASE SERPL-CCNC: <6 U/L (ref 11–82)
LYMPHOCYTES # BLD AUTO: 0.25 THOUSAND/UL (ref 0.6–4.47)
LYMPHOCYTES # BLD AUTO: 1 % (ref 14–44)
MCH RBC QN AUTO: 33.6 PG (ref 26.8–34.3)
MCHC RBC AUTO-ENTMCNC: 34.6 G/DL (ref 31.4–37.4)
MCV RBC AUTO: 97 FL (ref 82–98)
MONOCYTES # BLD AUTO: 0.38 THOUSAND/UL (ref 0–1.22)
MONOCYTES NFR BLD: 3 % (ref 4–12)
NEUTROPHILS # BLD MANUAL: 12.03 THOUSAND/UL (ref 1.85–7.62)
NEUTS BAND NFR BLD MANUAL: 13 % (ref 0–8)
NEUTS SEG NFR BLD AUTO: 82 % (ref 43–75)
NITRITE UR QL STRIP: NEGATIVE
NON-SQ EPI CELLS URNS QL MICRO: ABNORMAL /HPF
PH UR STRIP.AUTO: 5.5 [PH]
PLATELET # BLD AUTO: 154 THOUSANDS/UL (ref 149–390)
PLATELET BLD QL SMEAR: ADEQUATE
PMV BLD AUTO: 10.6 FL (ref 8.9–12.7)
POLYCHROMASIA BLD QL SMEAR: PRESENT
POTASSIUM SERPL-SCNC: 3.7 MMOL/L (ref 3.5–5.3)
PROT SERPL-MCNC: 5.9 G/DL (ref 6.4–8.4)
PROT UR STRIP-MCNC: ABNORMAL MG/DL
RBC # BLD AUTO: 4.26 MILLION/UL (ref 3.88–5.62)
RBC #/AREA URNS AUTO: ABNORMAL /HPF
RBC MORPH BLD: PRESENT
SODIUM SERPL-SCNC: 136 MMOL/L (ref 135–147)
SP GR UR STRIP.AUTO: 1.02 (ref 1–1.03)
TOXIC GRANULES BLD QL SMEAR: PRESENT
UROBILINOGEN UR STRIP-ACNC: <2 MG/DL
VARIANT LYMPHS # BLD AUTO: 1 %
WBC # BLD AUTO: 12.66 THOUSAND/UL (ref 4.31–10.16)
WBC #/AREA URNS AUTO: ABNORMAL /HPF

## 2024-07-11 PROCEDURE — 87205 SMEAR GRAM STAIN: CPT | Performed by: SPECIALIST

## 2024-07-11 PROCEDURE — 0F9430Z DRAINAGE OF GALLBLADDER WITH DRAINAGE DEVICE, PERCUTANEOUS APPROACH: ICD-10-PCS | Performed by: SPECIALIST

## 2024-07-11 PROCEDURE — 85027 COMPLETE CBC AUTOMATED: CPT | Performed by: EMERGENCY MEDICINE

## 2024-07-11 PROCEDURE — C1729 CATH, DRAINAGE: HCPCS

## 2024-07-11 PROCEDURE — 81001 URINALYSIS AUTO W/SCOPE: CPT | Performed by: EMERGENCY MEDICINE

## 2024-07-11 PROCEDURE — 83690 ASSAY OF LIPASE: CPT | Performed by: EMERGENCY MEDICINE

## 2024-07-11 PROCEDURE — C1769 GUIDE WIRE: HCPCS

## 2024-07-11 PROCEDURE — 99285 EMERGENCY DEPT VISIT HI MDM: CPT

## 2024-07-11 PROCEDURE — 87070 CULTURE OTHR SPECIMN AEROBIC: CPT | Performed by: SPECIALIST

## 2024-07-11 PROCEDURE — 96361 HYDRATE IV INFUSION ADD-ON: CPT

## 2024-07-11 PROCEDURE — NC001 PR NO CHARGE: Performed by: RADIOLOGY

## 2024-07-11 PROCEDURE — 47490 INCISION OF GALLBLADDER: CPT | Performed by: RADIOLOGY

## 2024-07-11 PROCEDURE — 87075 CULTR BACTERIA EXCEPT BLOOD: CPT | Performed by: SPECIALIST

## 2024-07-11 PROCEDURE — 87186 SC STD MICRODIL/AGAR DIL: CPT | Performed by: SPECIALIST

## 2024-07-11 PROCEDURE — 80053 COMPREHEN METABOLIC PANEL: CPT | Performed by: EMERGENCY MEDICINE

## 2024-07-11 PROCEDURE — 49406 IMAGE CATH FLUID PERI/RETRO: CPT | Performed by: RADIOLOGY

## 2024-07-11 PROCEDURE — 93005 ELECTROCARDIOGRAM TRACING: CPT

## 2024-07-11 PROCEDURE — 36415 COLL VENOUS BLD VENIPUNCTURE: CPT | Performed by: EMERGENCY MEDICINE

## 2024-07-11 PROCEDURE — 99223 1ST HOSP IP/OBS HIGH 75: CPT | Performed by: SPECIALIST

## 2024-07-11 PROCEDURE — 99285 EMERGENCY DEPT VISIT HI MDM: CPT | Performed by: EMERGENCY MEDICINE

## 2024-07-11 PROCEDURE — 85007 BL SMEAR W/DIFF WBC COUNT: CPT | Performed by: EMERGENCY MEDICINE

## 2024-07-11 PROCEDURE — 96374 THER/PROPH/DIAG INJ IV PUSH: CPT

## 2024-07-11 PROCEDURE — 10030 IMG GID FLU COLL DRG SFT TIS: CPT

## 2024-07-11 PROCEDURE — 74177 CT ABD & PELVIS W/CONTRAST: CPT

## 2024-07-11 PROCEDURE — 87077 CULTURE AEROBIC IDENTIFY: CPT | Performed by: SPECIALIST

## 2024-07-11 RX ORDER — HEPARIN SODIUM 5000 [USP'U]/ML
5000 INJECTION, SOLUTION INTRAVENOUS; SUBCUTANEOUS EVERY 8 HOURS SCHEDULED
Status: DISCONTINUED | OUTPATIENT
Start: 2024-07-11 | End: 2024-07-16 | Stop reason: HOSPADM

## 2024-07-11 RX ORDER — PRAMIPEXOLE DIHYDROCHLORIDE 0.25 MG/1
0.12 TABLET ORAL 3 TIMES DAILY
Status: DISCONTINUED | OUTPATIENT
Start: 2024-07-11 | End: 2024-07-16 | Stop reason: HOSPADM

## 2024-07-11 RX ORDER — ACETAMINOPHEN 325 MG/1
650 TABLET ORAL EVERY 6 HOURS PRN
Status: DISCONTINUED | OUTPATIENT
Start: 2024-07-11 | End: 2024-07-16 | Stop reason: HOSPADM

## 2024-07-11 RX ORDER — ONDANSETRON 2 MG/ML
4 INJECTION INTRAMUSCULAR; INTRAVENOUS EVERY 6 HOURS PRN
Status: DISCONTINUED | OUTPATIENT
Start: 2024-07-11 | End: 2024-07-16 | Stop reason: HOSPADM

## 2024-07-11 RX ORDER — CALCIUM CARBONATE 500 MG/1
1000 TABLET, CHEWABLE ORAL DAILY PRN
Status: DISCONTINUED | OUTPATIENT
Start: 2024-07-11 | End: 2024-07-16 | Stop reason: HOSPADM

## 2024-07-11 RX ORDER — SODIUM CHLORIDE 9 MG/ML
100 INJECTION, SOLUTION INTRAVENOUS CONTINUOUS
Status: DISCONTINUED | OUTPATIENT
Start: 2024-07-11 | End: 2024-07-13

## 2024-07-11 RX ORDER — SODIUM CHLORIDE 9 MG/ML
150 INJECTION, SOLUTION INTRAVENOUS CONTINUOUS
Status: DISCONTINUED | OUTPATIENT
Start: 2024-07-11 | End: 2024-07-11

## 2024-07-11 RX ADMIN — PRAMIPEXOLE DIHYDROCHLORIDE 0.12 MG: 0.25 TABLET ORAL at 21:50

## 2024-07-11 RX ADMIN — SODIUM CHLORIDE 150 ML/HR: 0.9 INJECTION, SOLUTION INTRAVENOUS at 16:56

## 2024-07-11 RX ADMIN — HEPARIN SODIUM 5000 UNITS: 5000 INJECTION INTRAVENOUS; SUBCUTANEOUS at 21:50

## 2024-07-11 RX ADMIN — SODIUM CHLORIDE 150 ML/HR: 0.9 INJECTION, SOLUTION INTRAVENOUS at 09:26

## 2024-07-11 RX ADMIN — IOHEXOL 100 ML: 350 INJECTION, SOLUTION INTRAVENOUS at 10:26

## 2024-07-11 RX ADMIN — CARBIDOPA AND LEVODOPA 1 TABLET: 25; 100 TABLET ORAL at 21:50

## 2024-07-11 RX ADMIN — PIPERACILLIN AND TAZOBACTAM 4.5 G: 4; .5 INJECTION, POWDER, FOR SOLUTION INTRAVENOUS at 13:25

## 2024-07-11 RX ADMIN — SODIUM CHLORIDE 100 ML/HR: 0.9 INJECTION, SOLUTION INTRAVENOUS at 18:42

## 2024-07-11 RX ADMIN — PIPERACILLIN AND TAZOBACTAM 4.5 G: 4; .5 INJECTION, POWDER, FOR SOLUTION INTRAVENOUS at 19:55

## 2024-07-11 RX ADMIN — MORPHINE SULFATE 2 MG: 2 INJECTION, SOLUTION INTRAMUSCULAR; INTRAVENOUS at 12:06

## 2024-07-11 NOTE — DISCHARGE INSTRUCTIONS
[facility] Interventional Radiology Tube Care Instructions                               [facility] Interventional Radiology # [phone]      Care after your procedure:    1. The properly functioning catheter should be flushed daily with 10ml of normal saline using clean technique, unless otherwise instructed by your physician.    2. The drainage bag or bulb may be emptied daily or as necessary with clean technique.      Record output daily.     3.  Change dressing daily over tube insertion site.    4.  As the tube is secured to the skin with only a suture, tub baths are not permitted.         Showers are permitted if the patient's skin entry is prevented from getting wet. Cover        Site and drain with saran wrap when showering.      Notify the Interventional Radiologist if you have any of the followin. Leakage of large amounts of liquid around catheter.    2. Fever of 101 degrees     3. Decreased drainage which may be associated with pressure or pain.    4. Catheter pulled back or falls out.    5. Schedule your follow up appointment if none has been given to you.

## 2024-07-11 NOTE — BRIEF OP NOTE (RAD/CATH)
INTERVENTIONAL RADIOLOGY PROCEDURE NOTE    Date: 7/11/2024    Procedure:   Perihepatic biloma drain placement  Cholecystostomy tube placement  Procedure Summary       Date:  Room / Location:     Anesthesia Start:  Anesthesia Stop:     Procedure:  Diagnosis:     Scheduled Providers:  Responsible Provider:     Anesthesia Type: Not recorded ASA Status: Not recorded            Preoperative diagnosis:   1. Cholecystitis with perforation of gallbladder    2. Abdominal pain    3. Bile peritonitis (HCC)         Postoperative diagnosis: Same.    Surgeon: Amado Bermudez MD     Assistant: None. No qualified resident was available.    Blood loss: 1 mL    Specimens:   20 mL bile aspirate from perihepatic biloma drain sent to lab for cultures.  20 mL dark bile aspirate from cholecystostomy tube sent to lab for cultures.    Findings:   Successful placement of 10 Fr catheter into perihepatic biloma.  Successful 10 Fr cholecystostomy tube placement.    Complications: None immediate.    Anesthesia: local

## 2024-07-11 NOTE — CONSULTS
e-Consult (IPC)  - Interventional Radiology  Edwin Elkins 89 y.o. male MRN: [id number]  Unit/Bed#: ED 05 Encounter: [id number]          Interventional Radiology has been consulted to evaluate Edwin Elkins    Consults  07/11/24    Assessment/Recommendation:   89 year old male with history of glaucoma, Parkinson disease, presents to ED with right abdominal pain for the past 3 weeks.  CT shows ruptured gallbladder with perihepatic fluid collection concerning for bile leak.    - Discussed with Surgery that placement of cholecystostomy tube would be high risk given the decompressed nature from perforation.  If patient and family are willing to accept the increased risks for complications, plan for rosalba tube placement.  - Plan for perihepatic drain placement.  - NPO.  - Hold blood thinners.      11-20 minutes, >50% of the total time devoted to medical consultative verbal/EMR discussion between providers. Written report will be generated in the EMR.     Thank you for allowing Interventional Radiology to participate in the care of Edwin Elkins. Please don't hesitate to call or TigerText us with any questions.     Amado Bermudez MD

## 2024-07-11 NOTE — SEDATION DOCUMENTATION
Procedure ended , 10fr abscess drain placed to bulb, 10 fr rosalba tube placed to bag drainage. Pt tolerated with local only. Vss pt to room in stable condition, bedside report given.

## 2024-07-11 NOTE — H&P
H&P Exam - General Surgery   Edwin Elkins 89 y.o. male MRN: [id number]  Unit/Bed#: ED 05 Encounter: [id number]    Assessment & Plan     Assessment:  89 year-old-male with a pmhx of Parkinson's disease presents with abdominal pain x3 weeks and refusing to eat for the past couple days. Imaging revealed acute perforated calculus cholecystitis with perihepatic fluid collection.   WBC count 12   T bili is 1.1, otherwise LFTs are within normal limits    Plan:  Admit to surgical service  IR to place a jesse-hepatic drain and attempt to place drain into GB fossa as well  Start Zosyn   Clinically stable currently, consider surgical intervention with cholecystectomy and wash out if patient worsens.  NPO at least until IR procedure complete  Patients son, Clovis, can be reached at [phone]        History of Present Illness   History provided primarily by patient's wife at bedside     HPI:  Edwin Elkins is a 89 y.o. male with a pmhx of Parkinson's disease and glaucoma who presents with 3 weeks of decrease appetite, difficulty tolerating oral intake, and abdominal pain. His wife states she was concerned about his lack of appetite so she gave him probiotics which did not resolve his symptoms. Imaging studies revealed a perforated acute calculus cholecystitis with perihepatic fluid collection. The patient's wife was hesitant to make a decision regarding surgery without being accompanied by her son who was on his way.       Review of Systems   Constitutional:  Positive for appetite change. Negative for chills, diaphoresis, fatigue and fever.   Respiratory:  Negative for cough, chest tightness, shortness of breath and wheezing.    Cardiovascular:  Negative for chest pain and palpitations.   Gastrointestinal:  Positive for abdominal pain. Negative for abdominal distention, blood in stool, constipation, diarrhea, nausea and vomiting.   Genitourinary:  Negative for dysuria.   Neurological: Negative.        Historical Information  "  Past Medical History:   Diagnosis Date    Glaucoma     Parkinson disease      History reviewed. No pertinent surgical history.  Social History   Social History     Substance and Sexual Activity   Alcohol Use Yes     Social History     Substance and Sexual Activity   Drug Use Never     Social History     Tobacco Use   Smoking Status Former   Smokeless Tobacco Never     E-Cigarette/Vaping    E-Cigarette Use Never User      E-Cigarette/Vaping Substances     Family History: History reviewed. No pertinent family history.    Meds/Allergies   PTA meds:   Prior to Admission Medications   Prescriptions Last Dose Informant Patient Reported? Taking?   carbidopa-levodopa (SINEMET)  mg per tablet 7/11/2024 at 0700 Self Yes Yes   Sig: Take 1 tablet by mouth 3 (three) times a day   dorzolamide (TRUSOPT) 2 % ophthalmic solution 7/10/2024 Spouse/Significant Other Yes Yes   pramipexole (MIRAPEX) 0.125 mg tablet  Spouse/Significant Other Yes No   Sig: TAKE ONE TABLET BY MOUTH THREE TIMES A DAY (GENERIC FOR MIRAPLEX)   tamsulosin (FLOMAX) 0.4 mg  Spouse/Significant Other Yes No   Sig: Take 0.4 mg by mouth      Facility-Administered Medications: None     No Known Allergies    Objective   First Vitals:   Blood Pressure: 112/61 (07/11/24 0909)  Pulse: 82 (07/11/24 0909)  Temperature: 97.9 °F (36.6 °C) (07/11/24 0909)  Temp Source: Oral (07/11/24 0909)  Respirations: (!) 24 (07/11/24 0909)  Height: 5' 10.5\" (179.1 cm) (07/11/24 0909)  Weight - Scale: 58.4 kg (128 lb 12 oz) (07/11/24 0909)  SpO2: 92 % (07/11/24 0909)    Current Vitals:   Blood Pressure: 120/65 (07/11/24 1300)  Pulse: 79 (07/11/24 1300)  Temperature: 97.9 °F (36.6 °C) (07/11/24 0909)  Temp Source: Oral (07/11/24 0909)  Respirations: 22 (07/11/24 1245)  Height: 5' 10.5\" (179.1 cm) (07/11/24 0909)  Weight - Scale: 58.4 kg (128 lb 12 oz) (07/11/24 0909)  SpO2: 94 % (07/11/24 1300)    No intake or output data in the 24 hours ending 07/11/24 1357    Invasive Devices       " Peripheral Intravenous Line  Duration             Peripheral IV 07/11/24 Proximal;Right;Ventral (anterior) Forearm <1 day                    Physical Exam  Constitutional:       General: He is awake. He is not in acute distress.     Appearance: Normal appearance. He is underweight. He is ill-appearing. He is not toxic-appearing or diaphoretic.      Comments: Cachectic    HENT:      Head: Normocephalic and atraumatic.   Eyes:      General: No scleral icterus.     Conjunctiva/sclera: Conjunctivae normal.      Pupils: Pupils are equal, round, and reactive to light.   Cardiovascular:      Rate and Rhythm: Normal rate and regular rhythm.      Heart sounds: Normal heart sounds. No murmur heard.  Pulmonary:      Effort: Pulmonary effort is normal. No tachypnea, bradypnea or respiratory distress.      Breath sounds: Normal breath sounds. No stridor. No wheezing, rhonchi or rales.   Abdominal:      General: Abdomen is flat. Bowel sounds are normal. There is no distension.      Palpations: Abdomen is soft. There is no mass.      Tenderness: There is no abdominal tenderness. There is no guarding or rebound.      Hernia: No hernia is present.   Musculoskeletal:      Right lower leg: No edema.      Left lower leg: No edema.   Skin:     General: Skin is warm and dry.      Coloration: Skin is pale. Skin is not cyanotic or mottled.   Neurological:      Mental Status: He is alert. Mental status is at baseline.      GCS: GCS eye subscore is 4. GCS verbal subscore is 5. GCS motor subscore is 6.      Motor: Atrophy present.      Comments: Baseline tremor and abnormal facies secondary to parkinson's   Psychiatric:         Mood and Affect: Affect is blunt.         Speech: Speech normal.         Behavior: Behavior is slowed.         Lab Results: CBC:   Lab Results   Component Value Date    WBC 12.66 (H) 07/11/2024    HGB 14.3 07/11/2024    HCT 41.3 07/11/2024    MCV 97 07/11/2024     07/11/2024    RBC 4.26 07/11/2024    MCH 33.6  07/11/2024    MCHC 34.6 07/11/2024    RDW 13.1 07/11/2024    MPV 10.6 07/11/2024   , CMP:   Lab Results   Component Value Date    SODIUM 136 07/11/2024    K 3.7 07/11/2024     07/11/2024    CO2 25 07/11/2024    BUN 70 (H) 07/11/2024    CREATININE 1.02 07/11/2024    CALCIUM 9.4 07/11/2024    AST 19 07/11/2024    ALT <3 (L) 07/11/2024    ALKPHOS 53 07/11/2024    EGFR 63 07/11/2024     Imaging: I have personally reviewed pertinent reports.    EKG, Pathology, and Other Studies: I have personally reviewed pertinent reports.      Code Status: No Order  Advance Directive and Living Will:      Power of :    POLST:

## 2024-07-11 NOTE — ED PROVIDER NOTES
History  Chief Complaint   Patient presents with    Abdominal Pain     Pt arrives from home via EMS c/o RLQ pain and loss of appetite for the last three weeks. No nausea, vomiting, diarrhea. No fevers.      Patient is an elderly gentleman has been complaining of pain in the lower abdomen right greater than left for approximately 3 weeks.  Patient states the pain has been intermittent not associated with food intake.  He is experiencing decreased appetite.  Denies any bowel changes.  No fever or chills no dysuria or hematuria.  He has tried Pepto-Bismol without relief.  He states he is here at the behest of his wife        Prior to Admission Medications   Prescriptions Last Dose Informant Patient Reported? Taking?   carbidopa-levodopa (SINEMET)  mg per tablet 7/11/2024 at 0700 Self Yes Yes   Sig: Take 1 tablet by mouth 3 (three) times a day   dorzolamide (TRUSOPT) 2 % ophthalmic solution 7/10/2024 Spouse/Significant Other Yes Yes   pramipexole (MIRAPEX) 0.125 mg tablet  Spouse/Significant Other Yes No   Sig: TAKE ONE TABLET BY MOUTH THREE TIMES A DAY (GENERIC FOR MIRAPLEX)   tamsulosin (FLOMAX) 0.4 mg  Spouse/Significant Other Yes No   Sig: Take 0.4 mg by mouth      Facility-Administered Medications: None       Past Medical History:   Diagnosis Date    Glaucoma     Parkinson disease        History reviewed. No pertinent surgical history.    History reviewed. No pertinent family history.  I have reviewed and agree with the history as documented.    E-Cigarette/Vaping    E-Cigarette Use Never User      E-Cigarette/Vaping Substances     Social History     Tobacco Use    Smoking status: Former    Smokeless tobacco: Never   Vaping Use    Vaping status: Never Used   Substance Use Topics    Alcohol use: Yes    Drug use: Never       Review of Systems   Constitutional:  Positive for appetite change. Negative for chills and fever.   HENT:  Negative for congestion and sore throat.    Eyes:  Negative for visual  disturbance.   Respiratory:  Negative for shortness of breath.    Cardiovascular:  Negative for chest pain.   Gastrointestinal:  Positive for abdominal pain. Negative for abdominal distention, blood in stool, diarrhea, nausea and vomiting.   Genitourinary:  Negative for decreased urine volume, dysuria and flank pain.   Musculoskeletal:  Negative for back pain.   Skin:  Negative for color change and rash.   Neurological:  Negative for weakness, light-headedness and headaches.   Hematological:  Does not bruise/bleed easily.   Psychiatric/Behavioral:  Negative for confusion.    All other systems reviewed and are negative.      Physical Exam  Physical Exam  Vitals and nursing note reviewed.   Constitutional:       Appearance: He is well-developed.   HENT:      Head: Normocephalic.      Mouth/Throat:      Mouth: Mucous membranes are moist.   Eyes:      Extraocular Movements: Extraocular movements intact.   Cardiovascular:      Rate and Rhythm: Normal rate and regular rhythm.   Pulmonary:      Effort: Pulmonary effort is normal.   Abdominal:      General: Abdomen is flat. Bowel sounds are normal.      Palpations: Abdomen is soft.      Tenderness: There is abdominal tenderness in the right lower quadrant, suprapubic area and left lower quadrant. There is no guarding. Negative signs include Burch's sign and McBurney's sign.   Skin:     General: Skin is warm and dry.      Capillary Refill: Capillary refill takes less than 2 seconds.   Neurological:      General: No focal deficit present.      Mental Status: He is alert and oriented to person, place, and time.   Psychiatric:         Mood and Affect: Mood normal.         Behavior: Behavior normal.         Vital Signs  ED Triage Vitals [07/11/24 0909]   Temperature Pulse Respirations Blood Pressure SpO2   97.9 °F (36.6 °C) 82 (!) 24 112/61 92 %      Temp Source Heart Rate Source Patient Position - Orthostatic VS BP Location FiO2 (%)   Oral Monitor Lying Right arm --      Pain  Score       6           Vitals:    07/11/24 1200 07/11/24 1230 07/11/24 1245 07/11/24 1300   BP: 130/67 123/70  120/65   Pulse:   79 79   Patient Position - Orthostatic VS:             Visual Acuity      ED Medications  Medications   sodium chloride 0.9 % infusion (150 mL/hr Intravenous New Bag 7/11/24 0926)   piperacillin-tazobactam (ZOSYN) IVPB 4.5 g (has no administration in time range)   iohexol (OMNIPAQUE) 350 MG/ML injection (MULTI-DOSE) 100 mL (100 mL Intravenous Given 7/11/24 1026)   morphine injection 2 mg (2 mg Intravenous Given 7/11/24 1206)       Diagnostic Studies  Results Reviewed       Procedure Component Value Units Date/Time    RBC Morphology Reflex Test [[id number]] Collected: 07/11/24 0925    Lab Status: Final result Specimen: Blood from Arm, Right Updated: 07/11/24 1101    CBC and differential [[id number]]  (Abnormal) Collected: 07/11/24 0925    Lab Status: Final result Specimen: Blood from Arm, Right Updated: 07/11/24 1035     WBC 12.66 Thousand/uL      RBC 4.26 Million/uL      Hemoglobin 14.3 g/dL      Hematocrit 41.3 %      MCV 97 fL      MCH 33.6 pg      MCHC 34.6 g/dL      RDW 13.1 %      MPV 10.6 fL      Platelets 154 Thousands/uL     Narrative:      This is an appended report.  These results have been appended to a previously verified report.    Manual Differential(PHLEBS Do Not Order) [[id number]]  (Abnormal) Collected: 07/11/24 0925    Lab Status: Final result Specimen: Blood from Arm, Right Updated: 07/11/24 1035     Segmented % 82 %      Bands % 13 %      Lymphocytes % 1 %      Monocytes % 3 %      Eosinophils % 0 %      Basophils % 0 %      Atypical Lymphocytes % 1 %      Absolute Neutrophils 12.03 Thousand/uL      Absolute Lymphocytes 0.25 Thousand/uL      Absolute Monocytes 0.38 Thousand/uL      Absolute Eosinophils 0.00 Thousand/uL      Absolute Basophils 0.00 Thousand/uL      Total Counted --     Dohle Bodies Present     Toxic Granulation Present     RBC Morphology Present      Platelet Estimate Adequate     Eliseo Cells Present     Polychromasia Present    Comprehensive metabolic panel [[id number]]  (Abnormal) Collected: 07/11/24 0925    Lab Status: Final result Specimen: Blood from Arm, Right Updated: 07/11/24 1013     Sodium 136 mmol/L      Potassium 3.7 mmol/L      Chloride 102 mmol/L      CO2 25 mmol/L      ANION GAP 9 mmol/L      BUN 70 mg/dL      Creatinine 1.02 mg/dL      Glucose 114 mg/dL      Calcium 9.4 mg/dL      Corrected Calcium 10.3 mg/dL      AST 19 U/L      ALT <3 U/L      Alkaline Phosphatase 53 U/L      Total Protein 5.9 g/dL      Albumin 2.9 g/dL      Total Bilirubin 1.11 mg/dL      eGFR 63 ml/min/1.73sq m     Narrative:      [employer] guidelines for Chronic Kidney Disease (CKD):     Stage 1 with normal or high GFR (GFR > 90 mL/min/1.73 square meters)    Stage 2 Mild CKD (GFR = 60-89 mL/min/1.73 square meters)    Stage 3A Moderate CKD (GFR = 45-59 mL/min/1.73 square meters)    Stage 3B Moderate CKD (GFR = 30-44 mL/min/1.73 square meters)    Stage 4 Severe CKD (GFR = 15-29 mL/min/1.73 square meters)    Stage 5 End Stage CKD (GFR <15 mL/min/1.73 square meters)  Note: GFR calculation is accurate only with a steady state creatinine    Lipase [[id number]]  (Abnormal) Collected: 07/11/24 0925    Lab Status: Final result Specimen: Blood from Arm, Right Updated: 07/11/24 1013     Lipase <6 u/L     Urine Microscopic [[id number]]  (Abnormal) Collected: 07/11/24 0944    Lab Status: Final result Specimen: Urine, Clean Catch Updated: 07/11/24 1001     RBC, UA None Seen /hpf      WBC, UA 2-4 /hpf      Epithelial Cells Occasional /hpf      Bacteria, UA Occasional /hpf      Hyaline Casts, UA 1-2 /lpf     UA (URINE) with reflex to Scope [[id number]]  (Abnormal) Collected: 07/11/24 0944    Lab Status: Final result Specimen: Urine, Clean Catch Updated: 07/11/24 0951     Color, UA Yellow     Clarity, UA Clear     Specific Gravity, UA 1.020     pH, UA 5.5     Leukocytes,  UA Small     Nitrite, UA Negative     Protein, UA Trace mg/dl      Glucose, UA Negative mg/dl      Ketones, UA Negative mg/dl      Urobilinogen, UA <2.0 mg/dl      Bilirubin, UA Negative     Occult Blood, UA Trace                   CT abdomen pelvis with contrast   Final Result by Hong Cruz MD (07/11 1116)      1.  Findings concerning for perforated acute calculus cholecystitis with perihepatic fluid collection. Mild pelvic ascites without peritoneal thickening/enhancement may be related or unrelated.   2.  Trace right pleural effusion with atelectasis.   3.  Thickened hyperemic distal esophagus concerning for esophagitis.      I personally discussed this study with MICAH MCGUIRE on 7/11/2024 11:14 AM.               Workstation performed: [id number]         IR cholecystostomy tube placement    (Results Pending)   IR drainage tube placement    (Results Pending)              Procedures  ECG 12 Lead Documentation Only    Date/Time: 7/11/2024 9:34 AM    Performed by: Micah Mcguire MD  Authorized by: Micah Mcguire MD    Indications / Diagnosis:  Abdominal pain  ECG reviewed by me, the ED Provider: yes    Patient location:  ED  Interpretation:     Interpretation: normal    Rate:     ECG rate:  80    ECG rate assessment: normal    Rhythm:     Rhythm: sinus rhythm    Ectopy:     Ectopy: none    QRS:     QRS axis:  Normal    QRS intervals:  Normal  Conduction:     Conduction: normal    ST segments:     ST segments:  Normal  T waves:     T waves: normal             ED Course                                               Medical Decision Making  Elderly gentleman with lower abdominal pain and appetite change.  Will CT scan the abdomen    Amount and/or Complexity of Data Reviewed  Labs: ordered.  Radiology: ordered.    Risk  Prescription drug management.  Decision regarding hospitalization.                 Disposition  Final diagnoses:   Abdominal pain   Cholecystitis with perforation of gallbladder    Bile peritonitis (HCC)     Time reflects when diagnosis was documented in both MDM as applicable and the Disposition within this note       Time User Action Codes Description Comment    7/11/2024  1:10 PM Jamie Mcguire Add [R10.9] Abdominal pain     7/11/2024  1:10 PM Jamie Mcguire Add [K82.A2] Cholecystitis with perforation of gallbladder     7/11/2024  1:10 PM Jamie Mcguire Add [K65.3] Bile peritonitis (HCC)     7/11/2024  1:10 PM Jamie Mcguire Modify [R10.9] Abdominal pain     7/11/2024  1:10 PM Jamie Mcguire Modify [K82.A2] Cholecystitis with perforation of gallbladder           ED Disposition       ED Disposition   Admit    Condition   Stable    Date/Time   Thu Jul 11, 2024  1:10 PM    Comment   Case was discussed with general surgery and the patient's admission status was agreed to be Admission Status: inpatient status to the service of Dr. Ledbetter .               Follow-up Information    None         Patient's Medications   Discharge Prescriptions    No medications on file       No discharge procedures on file.    PDMP Review       None            ED Provider  Electronically Signed by             Jamie Mcguire MD  07/11/24 6089

## 2024-07-12 PROBLEM — K65.3 BILE PERITONITIS (HCC): Status: ACTIVE | Noted: 2024-07-12

## 2024-07-12 PROBLEM — K66.8: Status: ACTIVE | Noted: 2024-07-12

## 2024-07-12 PROBLEM — K82.2 PERFORATED GALLBLADDER: Status: ACTIVE | Noted: 2024-07-12

## 2024-07-12 PROBLEM — K80.00 ACUTE CHOLECYSTITIS DUE TO BILIARY CALCULUS: Status: ACTIVE | Noted: 2024-07-12

## 2024-07-12 PROBLEM — A41.9 SEPSIS (HCC): Status: ACTIVE | Noted: 2024-07-12

## 2024-07-12 PROBLEM — G20.B1 PARKINSON'S DISEASE WITH DYSKINESIA: Status: ACTIVE | Noted: 2024-07-12

## 2024-07-12 PROBLEM — E44.0 MODERATE PROTEIN-CALORIE MALNUTRITION (HCC): Status: ACTIVE | Noted: 2024-07-12

## 2024-07-12 LAB
ALBUMIN SERPL BCG-MCNC: 2.5 G/DL (ref 3.5–5)
ALP SERPL-CCNC: 46 U/L (ref 34–104)
ALT SERPL W P-5'-P-CCNC: 5 U/L (ref 7–52)
ANION GAP SERPL CALCULATED.3IONS-SCNC: 9 MMOL/L (ref 4–13)
AST SERPL W P-5'-P-CCNC: 15 U/L (ref 13–39)
ATRIAL RATE: 80 BPM
BILIRUB SERPL-MCNC: 1.06 MG/DL (ref 0.2–1)
BUN SERPL-MCNC: 52 MG/DL (ref 5–25)
CALCIUM ALBUM COR SERPL-MCNC: 9.4 MG/DL (ref 8.3–10.1)
CALCIUM SERPL-MCNC: 8.2 MG/DL (ref 8.4–10.2)
CHLORIDE SERPL-SCNC: 108 MMOL/L (ref 96–108)
CO2 SERPL-SCNC: 22 MMOL/L (ref 21–32)
CREAT SERPL-MCNC: 0.87 MG/DL (ref 0.6–1.3)
ERYTHROCYTE [DISTWIDTH] IN BLOOD BY AUTOMATED COUNT: 13.2 % (ref 11.6–15.1)
GFR SERPL CREATININE-BSD FRML MDRD: 75 ML/MIN/1.73SQ M
GLUCOSE SERPL-MCNC: 68 MG/DL (ref 65–140)
HCT VFR BLD AUTO: 36.9 % (ref 36.5–49.3)
HGB BLD-MCNC: 12.5 G/DL (ref 12–17)
MAGNESIUM SERPL-MCNC: 1.8 MG/DL (ref 1.9–2.7)
MCH RBC QN AUTO: 33.2 PG (ref 26.8–34.3)
MCHC RBC AUTO-ENTMCNC: 33.9 G/DL (ref 31.4–37.4)
MCV RBC AUTO: 98 FL (ref 82–98)
P AXIS: 70 DEGREES
PHOSPHATE SERPL-MCNC: 3 MG/DL (ref 2.3–4.1)
PLATELET # BLD AUTO: 121 THOUSANDS/UL (ref 149–390)
PMV BLD AUTO: 10.4 FL (ref 8.9–12.7)
POTASSIUM SERPL-SCNC: 3.4 MMOL/L (ref 3.5–5.3)
PR INTERVAL: 164 MS
PROT SERPL-MCNC: 5 G/DL (ref 6.4–8.4)
QRS AXIS: 36 DEGREES
QRSD INTERVAL: 86 MS
QT INTERVAL: 352 MS
QTC INTERVAL: 405 MS
RBC # BLD AUTO: 3.76 MILLION/UL (ref 3.88–5.62)
SODIUM SERPL-SCNC: 139 MMOL/L (ref 135–147)
T WAVE AXIS: 29 DEGREES
VENTRICULAR RATE: 80 BPM
WBC # BLD AUTO: 10.12 THOUSAND/UL (ref 4.31–10.16)

## 2024-07-12 PROCEDURE — 97163 PT EVAL HIGH COMPLEX 45 MIN: CPT

## 2024-07-12 PROCEDURE — 80053 COMPREHEN METABOLIC PANEL: CPT | Performed by: PHYSICIAN ASSISTANT

## 2024-07-12 PROCEDURE — 83735 ASSAY OF MAGNESIUM: CPT | Performed by: PHYSICIAN ASSISTANT

## 2024-07-12 PROCEDURE — 84100 ASSAY OF PHOSPHORUS: CPT | Performed by: PHYSICIAN ASSISTANT

## 2024-07-12 PROCEDURE — 85027 COMPLETE CBC AUTOMATED: CPT | Performed by: PHYSICIAN ASSISTANT

## 2024-07-12 PROCEDURE — 97110 THERAPEUTIC EXERCISES: CPT

## 2024-07-12 PROCEDURE — 93010 ELECTROCARDIOGRAM REPORT: CPT | Performed by: INTERNAL MEDICINE

## 2024-07-12 PROCEDURE — 99232 SBSQ HOSP IP/OBS MODERATE 35: CPT | Performed by: SURGERY

## 2024-07-12 RX ADMIN — PIPERACILLIN AND TAZOBACTAM 4.5 G: 4; .5 INJECTION, POWDER, FOR SOLUTION INTRAVENOUS at 14:36

## 2024-07-12 RX ADMIN — PIPERACILLIN AND TAZOBACTAM 4.5 G: 4; .5 INJECTION, POWDER, FOR SOLUTION INTRAVENOUS at 20:48

## 2024-07-12 RX ADMIN — PRAMIPEXOLE DIHYDROCHLORIDE 0.12 MG: 0.25 TABLET ORAL at 15:15

## 2024-07-12 RX ADMIN — CARBIDOPA AND LEVODOPA 1 TABLET: 25; 100 TABLET ORAL at 09:23

## 2024-07-12 RX ADMIN — CARBIDOPA AND LEVODOPA 1 TABLET: 25; 100 TABLET ORAL at 15:15

## 2024-07-12 RX ADMIN — CARBIDOPA AND LEVODOPA 1 TABLET: 25; 100 TABLET ORAL at 20:52

## 2024-07-12 RX ADMIN — HEPARIN SODIUM 5000 UNITS: 5000 INJECTION INTRAVENOUS; SUBCUTANEOUS at 21:00

## 2024-07-12 RX ADMIN — SODIUM CHLORIDE 100 ML/HR: 0.9 INJECTION, SOLUTION INTRAVENOUS at 14:31

## 2024-07-12 RX ADMIN — PIPERACILLIN AND TAZOBACTAM 4.5 G: 4; .5 INJECTION, POWDER, FOR SOLUTION INTRAVENOUS at 01:55

## 2024-07-12 RX ADMIN — PRAMIPEXOLE DIHYDROCHLORIDE 0.12 MG: 0.25 TABLET ORAL at 09:23

## 2024-07-12 RX ADMIN — HEPARIN SODIUM 5000 UNITS: 5000 INJECTION INTRAVENOUS; SUBCUTANEOUS at 15:14

## 2024-07-12 RX ADMIN — PRAMIPEXOLE DIHYDROCHLORIDE 0.12 MG: 0.25 TABLET ORAL at 20:52

## 2024-07-12 RX ADMIN — ONDANSETRON 4 MG: 2 INJECTION INTRAMUSCULAR; INTRAVENOUS at 09:22

## 2024-07-12 RX ADMIN — SODIUM CHLORIDE 100 ML/HR: 0.9 INJECTION, SOLUTION INTRAVENOUS at 01:55

## 2024-07-12 RX ADMIN — PIPERACILLIN AND TAZOBACTAM 4.5 G: 4; .5 INJECTION, POWDER, FOR SOLUTION INTRAVENOUS at 09:24

## 2024-07-12 RX ADMIN — HEPARIN SODIUM 5000 UNITS: 5000 INJECTION INTRAVENOUS; SUBCUTANEOUS at 05:29

## 2024-07-12 NOTE — PROGRESS NOTES
"Progress Note - General Surgery   Edwin Elkins 89 y.o. male MRN: [id number]  Unit/Bed#: [address] [place] [address] Encounter: [id number]    Assessment:  89 year-old-male with a history of Parkinson's disease HD 1 for calculous gallbladder perforation s/p IR drainage of gallbladder and perihepatic space. WBC trending down.     WBC 12.66 > 10.12  T bili 1.1 > 1.06   Cholecystostomy drain output 120 bilious fluid  Perihepatic drain output 290 bilious fluid     Plan:  Continue to monitor for clinical improvement over weekend, if worsening condition consider cholecystectomy.    If patient continues to improve, can consider discharge for outpatient follow up  Continue IV zosyn  Can start diet  Consult PT/OT      Subjective/Objective   Chief Complaint: Calculous gallbladder perforation    Subjective:   Patient feeling well with no acute complaints. Denies abdominal pain, nausea, vomiting, headache, fever/chills, chest pain, SOB. He is able to urinate well, no BM or passing gas yet. He did not sleep well due to the noise in the hospital.     Objective:       Blood pressure 128/63, pulse 75, temperature 97.9 °F (36.6 °C), resp. rate 18, height 5' 10\" (1.778 m), weight 58.1 kg (128 lb), SpO2 92%.,Body mass index is 18.37 kg/m².      Intake/Output Summary (Last 24 hours) at 7/12/2024 0710  Last data filed at 7/12/2024 0538  Gross per 24 hour   Intake 110 ml   Output 910 ml   Net -800 ml       Invasive Devices       Peripheral Intravenous Line  Duration             Peripheral IV 07/11/24 Distal;Dorsal (posterior);Right Forearm <1 day              Drain  Duration             Abscess Drain Abdomen <1 day    Cholecystostomy Tube <1 day                    Physical Exam:   Gen: Awake, alert and coherent, NAD, lying in bed. Cachectic, non-toxic, non-diaphoretic  HEENT: normocephalic, atraumatic, PERRL, no scleral icterus   Heart: RRR no murmurs  Lungs: normal respiratory effort, CTA bilaterally   Abdomen: BS x 4, flat, NTTP. Gauze bandages on " right side appear clean and dry without saturation. Abdomen is firm to palpation, but no peritoneal signs, masses, or guarding  PV: 2+ pulses in extremities, no edema   Neuro: baseline tremor, slowed speech    Lab, Imaging and other studies:I have personally reviewed pertinent lab results.  , CBC:   Lab Results   Component Value Date    WBC 10.12 07/12/2024    HGB 12.5 07/12/2024    HCT 36.9 07/12/2024    MCV 98 07/12/2024     (L) 07/12/2024    RBC 3.76 (L) 07/12/2024    MCH 33.2 07/12/2024    MCHC 33.9 07/12/2024    RDW 13.2 07/12/2024    MPV 10.4 07/12/2024   , CMP:   Lab Results   Component Value Date    SODIUM 139 07/12/2024    K 3.4 (L) 07/12/2024     07/12/2024    CO2 22 07/12/2024    BUN 52 (H) 07/12/2024    CREATININE 0.87 07/12/2024    CALCIUM 8.2 (L) 07/12/2024    AST 15 07/12/2024    ALT 5 (L) 07/12/2024    ALKPHOS 46 07/12/2024    EGFR 75 07/12/2024     VTE Pharmacologic Prophylaxis: Heparin  VTE Mechanical Prophylaxis: sequential compression device

## 2024-07-12 NOTE — PLAN OF CARE
Problem: PHYSICAL THERAPY ADULT  Goal: Performs mobility at highest level of function for planned discharge setting.  See evaluation for individualized goals.  Description: Treatment/Interventions: Functional transfer training, ADL retraining, LE strengthening/ROM, Therapeutic exercise, Endurance training, Bed mobility, Gait training, Spoke to nursing, OT          See flowsheet documentation for full assessment, interventions and recommendations.  Note: Prognosis: Good  Problem List: Decreased strength, Decreased endurance, Impaired balance, Decreased mobility, Impaired judgement, Decreased safety awareness  Assessment: Patient seen for Physical Therapy evaluation. Patient admitted with Acute cholecystitis due to biliary calculus.  Comorbidities affecting patient's physical performance include: DVT and Parkinsons.  Personal factors affecting patient at time of initial evaluation include: lives in 2 story house, ambulating with assistive device, stairs to enter home, inability to navigate community distances, inability to navigate level surfaces without external assistance, positive fall history, inability to perform ADLS, and inability to perform IADLS . Prior to admission, patient was independent with functional mobility with walker, independent with ADLS, requiring assist for IADLS, living with spouse in a 2 level home with 3 steps to enter, and ambulating household distance.  Please find objective findings from Physical Therapy assessment regarding body systems outlined above with impairments and limitations including weakness, impaired balance, decreased endurance, gait deviations, pain, decreased activity tolerance, decreased functional mobility tolerance, decreased safety awareness, impaired judgement, and fall risk.  The Barthel Index was used as a functional outcome tool presenting with a score of Barthel Index Score: 50 today indicating marked limitations of functional mobility and ADLS.  Patient's  clinical presentation is currently unstable/unpredictable as seen in patient's presentation of increased fall risk, new onset of impairment of functional mobility, and decreased endurance. Pt would benefit from continued Physical Therapy treatment to address deficits as defined above and maximize level of functional mobility. As demonstrated by objective findings, the assigned level of complexity for this evaluation is high.The patient's AM-PAC Basic Mobility Inpatient Short Form Raw Score is 16. A Raw score of less than or equal to 16 suggests the patient may benefit from discharge to post-acute rehabilitation services. Please also refer to the recommendation of the Physical Therapist for safe discharge planning.        Rehab Resource Intensity Level, PT: II (Moderate Resource Intensity)    See flowsheet documentation for full assessment.         No

## 2024-07-12 NOTE — PLAN OF CARE
Problem: Potential for Falls  Goal: Patient will remain free of falls  Description: INTERVENTIONS:  - Educate patient/family on patient safety including physical limitations  - Instruct patient to call for assistance with activity   - Consult OT/PT to assist with strengthening/mobility   - Keep Call bell within reach  - Keep bed low and locked with side rails adjusted as appropriate  - Keep care items and personal belongings within reach  - Initiate and maintain comfort rounds  - Make Fall Risk Sign visible to staff  - Offer Toileting every 2  Hours, in advance of need  - Initiate/Maintain  yes alarm  - Obtain necessary fall risk management equipment: yes  - Apply yellow socks and bracelet for high fall risk patients  - Consider moving patient to room near nurses station  Outcome: Progressing

## 2024-07-12 NOTE — UTILIZATION REVIEW
Initial Clinical Review    Admission: Date/Time/Statement:   Admission Orders (From admission, onward)       Ordered        07/11/24 1311  INPATIENT ADMISSION  Once                          Orders Placed This Encounter   Procedures    INPATIENT ADMISSION     Standing Status:   Standing     Number of Occurrences:   1     Order Specific Question:   Level of Care     Answer:   Med Surg [16]     Order Specific Question:   Estimated length of stay     Answer:   More than 2 Midnights     Order Specific Question:   Certification     Answer:   I certify that inpatient services are medically necessary for this patient for a duration of greater than two midnights. See H&P and MD Progress Notes for additional information about the patient's course of treatment.     ED Arrival Information       Expected   -    Arrival   7/11/2024 09:05    Acuity   Urgent              Means of arrival   Ambulance    Escorted by   [facility]    Service   Surgery-General    Admission type   Emergency              Arrival complaint   abd pain             Chief Complaint   Patient presents with    Abdominal Pain     Pt arrives from home via EMS c/o RLQ pain and loss of appetite for the last three weeks. No nausea, vomiting, diarrhea. No fevers.        Initial Presentation: 89 y.o. male presents to ED via  EMS  from home with decreased appetite, difficulty wolerating po intake and abdominal pain  for  past  3 weeks.   Given probiotics at home without relief.  Imaging reveals  perforated acute calculus cholecystitis.  PMH is  Parkinson's and glaucoma.  Admit  Ip with  Acute  cholecystitis  and plan is  LETY,  IR  intervention, monitor labs  and possible surgical  plan.    INTERVENTIONAL RADIOLOGY PROCEDURE NOTE     Date: 7/11/2024    Findings:   Successful placement of 10 Fr catheter into perihepatic biloma.  Successful 10 Fr cholecystostomy tube placement.       Date:    7/12   Day 2:   Cholecystostomy  drain  output   120 cc, perihepatic   drain output   290 cc.   Continue  LETY.   Needs  PT/OT.  Can  start diet.  Denies a ny pain.  No BM or flatus since admission.       Date:   7/13  Day 3: Has surpassed a 2nd midnight with active treatments and services.  Tolerating diet.   IVF  d/c.    Needs  PT/OT.   Continue pain control as needed.   Perihepatic  drain output   10 cc.    Cholecystostomy  drain output   150 cc.   Abdominal dressing   dry and intact.   Abdomen firm to palpation.   Baseline tremor, slow speech noted.  Cachectic.         ED Triage Vitals [07/11/24 0909]   Temperature Pulse Respirations Blood Pressure SpO2 Pain Score   97.9 °F (36.6 °C) 82 (!) 24 112/61 92 % 6     Weight (last 2 days)       Date/Time Weight    07/11/24 15:22:04 58.1 (128)    07/11/24 0909 58.4 (128.75)            Vital Signs (last 3 days)       Date/Time Temp Pulse Resp BP MAP (mmHg) SpO2 O2 Device Patient Position - Orthostatic VS Pain    07/12/24 07:23:51 97.6 °F (36.4 °C) 77 16 125/67 86 93 % -- -- --    07/11/24 2345 -- 75 -- 128/63 85 92 % -- -- --    07/11/24 2315 -- 72 -- -- -- 92 % -- -- --    07/11/24 21:52:30 97.9 °F (36.6 °C) 79 18 118/62 81 93 % -- -- --    07/11/24 2039 -- -- -- -- -- -- -- -- No Pain    07/11/24 18:12:41 97.8 °F (36.6 °C) 77 20 112/63 79 92 % None (Room air) Lying --    07/11/24 16:54:35 -- 79 -- 112/53 73 91 % -- -- --    07/11/24 16:04:53 97.9 °F (36.6 °C) 82 -- 131/72 92 91 % None (Room air) -- No Pain    07/11/24 15:22:04 -- 80 16 127/85 -- 94 % -- -- --    07/11/24 15:03:44 -- 77 16 133/69 -- 94 % -- -- --    07/11/24 15:00:32 -- -- -- -- -- -- -- -- No Pain    07/11/24 1430 -- 79 -- 132/84 103 91 % -- -- --    07/11/24 1415 -- 75 -- -- -- 94 % -- -- --    07/11/24 1400 -- 79 -- 122/57 81 93 % -- -- --    07/11/24 1330 -- 79 -- 108/59 78 93 % -- -- --    07/11/24 1300 -- 79 -- 120/65 87 94 % -- -- --    07/11/24 1245 -- 79 22 -- -- 93 % -- -- --    07/11/24 1233 -- -- -- -- -- -- -- -- No Pain    07/11/24 1230 -- -- -- 123/70 92 --  -- -- --    07/11/24 1206 -- -- -- -- -- -- -- -- 6    07/11/24 1200 -- -- -- 130/67 90 -- -- -- --    07/11/24 1100 -- -- -- 105/59 76 -- -- -- --    07/11/24 1030 -- 88 24 113/60 81 92 % None (Room air) Lying --    07/11/24 0909 97.9 °F (36.6 °C) 82 24 112/61 -- 92 % None (Room air) Lying 6              Pertinent Labs/Diagnostic Test Results:   Radiology:  IR cholecystostomy tube placement   Final Interpretation by Amado Bermudez MD (07/11 1615)      1. Perihepatic biloma drainage catheter placement using 10 Brazilian catheter.   2. 10 Brazilian cholecystostomy tube placement for perforated cholecystitis.      Plan:      -Flush each catheter with 10 mL normal saline daily.   -Perihepatic biloma drainage catheter to bulb suction drainage.   -Cholecystostomy tube to bag gravity drainage.   -Return in 2 weeks for perihepatic biloma drain check.   -Return in 3 months for routine cholecystostomy tube exchange.               Workstation performed: [id number]         CT abdomen pelvis with contrast   Final Interpretation by Hong Cruz MD (07/11 1116)      1.  Findings concerning for perforated acute calculus cholecystitis with perihepatic fluid collection. Mild pelvic ascites without peritoneal thickening/enhancement may be related or unrelated.   2.  Trace right pleural effusion with atelectasis.   3.  Thickened hyperemic distal esophagus concerning for esophagitis.      I personally discussed this study with MICAH VGEA on 7/11/2024 11:14 AM.               Workstation performed: [id number]         IR drainage tube placement    (Results Pending)   IR cholecystostomy tube check/change/reposition/reinsertion/upsize    (Results Pending)   IR drainage tube check/change/reposition/reinsertion/upsize    (Results Pending)         Results from last 7 days   Lab Units 07/12/24  0409 07/11/24  0925   WBC Thousand/uL 10.12 12.66*   HEMOGLOBIN g/dL 12.5 14.3   HEMATOCRIT % 36.9 41.3   PLATELETS Thousands/uL 121* 154    BANDS PCT %  --  13*         Results from last 7 days   Lab Units 07/12/24  0409 07/11/24  0925   SODIUM mmol/L 139 136   POTASSIUM mmol/L 3.4* 3.7   CHLORIDE mmol/L 108 102   CO2 mmol/L 22 25   ANION GAP mmol/L 9 9   BUN mg/dL 52* 70*   CREATININE mg/dL 0.87 1.02   EGFR ml/min/1.73sq m 75 63   CALCIUM mg/dL 8.2* 9.4   MAGNESIUM mg/dL 1.8*  --    PHOSPHORUS mg/dL 3.0  --      Results from last 7 days   Lab Units 07/12/24  0409 07/11/24  0925   AST U/L 15 19   ALT U/L 5* <3*   ALK PHOS U/L 46 53   TOTAL PROTEIN g/dL 5.0* 5.9*   ALBUMIN g/dL 2.5* 2.9*   TOTAL BILIRUBIN mg/dL 1.06* 1.11*         Results from last 7 days   Lab Units 07/12/24  0409 07/11/24  0925   GLUCOSE RANDOM mg/dL 68 114           Results from last 7 days   Lab Units 07/11/24  0925   LIPASE u/L <6*                 Results from last 7 days   Lab Units 07/11/24  0944   CLARITY UA  Clear   COLOR UA  Yellow   SPEC GRAV UA  1.020   PH UA  5.5   GLUCOSE UA mg/dl Negative   KETONES UA mg/dl Negative   BLOOD UA  Trace*   PROTEIN UA mg/dl Trace*   NITRITE UA  Negative   BILIRUBIN UA  Negative   UROBILINOGEN UA (BE) mg/dl <2.0   LEUKOCYTES UA  Small*   WBC UA /hpf 2-4   RBC UA /hpf None Seen   BACTERIA UA /hpf Occasional   EPITHELIAL CELLS WET PREP /hpf Occasional           Results from last 7 days   Lab Units 07/11/24  1543   GRAM STAIN RESULT  No Polys or Bacteria seen                   ED Treatment-Medication Administration from 07/11/2024 0905 to 07/11/2024 1550         Date/Time Order Dose Route Action     07/11/2024 0926 sodium chloride 0.9 % infusion 150 mL/hr Intravenous New Bag     07/11/2024 1026 iohexol (OMNIPAQUE) 350 MG/ML injection (MULTI-DOSE) 100 mL 100 mL Intravenous Given     07/11/2024 1206 morphine injection 2 mg 2 mg Intravenous Given     07/11/2024 1325 piperacillin-tazobactam (ZOSYN) IVPB 4.5 g 4.5 g Intravenous New Bag              Present on Admission:   Perforated gallbladder   Acute cholecystitis due to biliary calculus   Bile  peritonitis (HCC)   Extrahepatic biloma   Venous insufficiency of both lower extremities   Parkinson's disease with dyskinesia   Sepsis (HCC)   Moderate protein-calorie malnutrition (HCC)      Admitting Diagnosis: Abdominal pain [R10.9]  Bile peritonitis (HCC) [K65.3]  Cholecystitis with perforation of gallbladder [K82.A2]  Age/Sex: 89 y.o. male  Admission Orders:  Scheduled Medications:  carbidopa-levodopa, 1 tablet, Oral, TID  heparin (porcine), 5,000 Units, Subcutaneous, Q8H SIMON  piperacillin-tazobactam, 4.5 g, Intravenous, Q6H  pramipexole, 0.125 mg, Oral, TID      Continuous IV Infusions:  sodium chloride, 100 mL/hr, Intravenous, Continuous      PRN Meds:  acetaminophen, 650 mg, Oral, Q6H PRN  calcium carbonate, 1,000 mg, Oral, Daily PRN  ondansetron, 4 mg, Intravenous, Q6H PRN  oxyCODONE, 2.5 mg, Oral, Q6H PRN        IP CONSULT TO CASE MANAGEMENT    Network Utilization Review Department  ATTENTION: Please call with any questions or concerns to [phone] and carefully listen to the prompts so that you are directed to the right person. All voicemails are confidential.   For Discharge needs, contact Care Management DC Support Team at [phone] opt. 2  Send all requests for admission clinical reviews, approved or denied determinations and any other requests to dedicated fax number below belonging to the campus where the patient is receiving treatment. List of dedicated fax numbers for the Facilities:  FACILITY NAME UR FAX NUMBER   ADMISSION DENIALS (Administrative/Medical Necessity) [phone]   DISCHARGE SUPPORT TEAM (NETWORK) [phone]   PARENT CHILD HEALTH (Maternity/NICU/Pediatrics) [phone]   [facility] [phone]   [facility] [phone]   [facility] [phone]   [facility] [phone]   [facility] [phone]   [facility]  [phone]   [facility] [phone]   [facility] [phone]   [facility] [phone]   [facility] [phone]   [facility] [phone]   [facility] [phone]

## 2024-07-12 NOTE — PHYSICAL THERAPY NOTE
PHYSICAL THERAPY EVALUATION/TREATMENT       07/12/24 1204   PT Last Visit   PT Visit Date 07/12/24   Note Type   Note type Evaluation   Pain Assessment   Pain Assessment Tool 0-10   Pain Score No Pain   Patient's Stated Pain Goal No pain   Multiple Pain Sites No   Restrictions/Precautions   Weight Bearing Precautions Per Order No   Other Precautions Bed Alarm;Chair Alarm;Fall Risk   Home Living   Type of Home House   Home Layout Two level;Bed/bath upstairs;Stairs to enter with rails  (3 YARELIS)   Bathroom Toilet Standard   Bathroom Equipment Grab bars in shower;Shower chair   Home Equipment Walker;Grab bars   Prior Function   Level of Bertie Independent with ADLs;Independent with functional mobility;Needs assistance with IADLS  (Pt reports he typically dresses/bathes himself)   Lives With Spouse   Receives Help From Family   IADLs Family/Friend/Other provides transportation;Family/Friend/Other provides meals   Falls in the last 6 months 1 to 4  (1-2 falls ; most recent approx 5 months ago per pt)   Vocational Retired   General   Additional Pertinent History Pt is a 89 year-old male who was admitted to the hospital on 7/11/24 abdominal pain, had choly tube placed 7/11   Family/Caregiver Present Yes  (wife + family at bedside)   Cognition   Arousal/Participation Cooperative   Orientation Level Oriented to person;Oriented to place;Oriented to time;Oriented to situation  (Generally oriented to month/year)   Following Commands Follows multistep commands with increased time or repetition   Subjective   Subjective Pt agreeable to PT session this AM.   RLE Assessment   RLE Assessment   (Hip/knee 4-/5 ; ankle 3-/5)   LLE Assessment   LLE Assessment   (Hip/knee 4-/5 ; ankle 3-/5)   Bed Mobility   Supine to Sit Unable to assess   Sit to Supine Unable to assess   Transfers   Sit to Stand 4  Minimal assistance   Additional items Assist x 1;Verbal cues;Increased time required;Armrests   Stand to Sit 4  Minimal  assistance   Additional items Assist x 1;Verbal cues;Increased time required;Armrests   Toilet transfer 4  Minimal assistance   Additional items Assist x 1;Verbal cues;Increased time required;Armrests   Ambulation/Elevation   Gait pattern Foward flexed;Short stride;Narrow KYAW;Step through pattern  (decreased gait speed ; unsteadiness when turning with intermittent posterior LOB, requires Min A to correct)   Gait Assistance 4  Minimal assist   Additional items Assist x 1;Verbal cues   Assistive Device Rolling walker   Distance 50 feet with change of direction   Stair Management Assistance Not tested   Balance   Static Sitting Fair   Dynamic Sitting Fair -   Static Standing Fair -   Dynamic Standing Fair -   Ambulatory Poor +  (RW)   Activity Tolerance   Activity Tolerance Patient tolerated treatment well;Patient limited by fatigue   Nurse Made Aware yes RN Gen   Assessment   Prognosis Good   Problem List Decreased strength;Decreased endurance;Impaired balance;Decreased mobility;Impaired judgement;Decreased safety awareness   Assessment Patient seen for Physical Therapy evaluation. Patient admitted with Acute cholecystitis due to biliary calculus.  Comorbidities affecting patient's physical performance include: DVT and Parkinsons.  Personal factors affecting patient at time of initial evaluation include: lives in 2 story house, ambulating with assistive device, stairs to enter home, inability to navigate community distances, inability to navigate level surfaces without external assistance, positive fall history, inability to perform ADLS, and inability to perform IADLS . Prior to admission, patient was independent with functional mobility with walker, independent with ADLS, requiring assist for IADLS, living with spouse in a 2 level home with 3 steps to enter, and ambulating household distance.  Please find objective findings from Physical Therapy assessment regarding body systems outlined above with impairments and  limitations including weakness, impaired balance, decreased endurance, gait deviations, pain, decreased activity tolerance, decreased functional mobility tolerance, decreased safety awareness, impaired judgement, and fall risk.  The Barthel Index was used as a functional outcome tool presenting with a score of Barthel Index Score: 50 today indicating marked limitations of functional mobility and ADLS.  Patient's clinical presentation is currently unstable/unpredictable as seen in patient's presentation of increased fall risk, new onset of impairment of functional mobility, and decreased endurance. Pt would benefit from continued Physical Therapy treatment to address deficits as defined above and maximize level of functional mobility. As demonstrated by objective findings, the assigned level of complexity for this evaluation is high.The patient's -[facility] Basic Mobility Inpatient Short Form Raw Score is 16. A Raw score of less than or equal to 16 suggests the patient may benefit from discharge to post-acute rehabilitation services. Please also refer to the recommendation of the Physical Therapist for safe discharge planning.   Goals   Patient Goals to feel better   STG Expiration Date 07/19/24   Short Term Goal #1 Pt will perform bed mobility with supervision ; Pt will perform bed <> chair transfer with supervision + RW ; Standing balance will improve to fair to decrease risk of falls ; BLE strength will improve by 1/2 grade to improve tolerance to functional mobility ; Pt will ambulate x 50 feet with supervision + RW ; Pt will negotiate x 3 steps with Min A + rail to enter/exit home ; AMPAC score will improve >18/24 to demonstrate improved functional independence   LTG Expiration Date 07/26/24   Long Term Goal #1 Pt will perform bed mobility IND ; Pt will perform bed <> chair transfer Mod I + RW ; Standing balance will improve to fair+ to decrease risk of falls ; BLE strength will improve by 1 grade to improve  tolerance to functional mobility ; Pt will ambulate x 50 feet Mod I + RW ; Pt will negotiate x 3 steps with supervision + rail to enter/exit home ; AMPAC score will improve >20/24 to demonstrate improved functional independence   Plan   Treatment/Interventions Functional transfer training;ADL retraining;LE strengthening/ROM;Therapeutic exercise;Endurance training;Bed mobility;Gait training;Spoke to nursing;OT   PT Frequency Other (Comment)  (5x/week)   Discharge Recommendation   Rehab Resource Intensity Level, PT II (Moderate Resource Intensity)   AM-PAC Basic Mobility Inpatient   Turning in Flat Bed Without Bedrails 3   Lying on Back to Sitting on Edge of Flat Bed Without Bedrails 3   Moving Bed to Chair 3   Standing Up From Chair Using Arms 3   Walk in Room 3   Climb 3-5 Stairs With Railing 1   Basic Mobility Inpatient Raw Score 16   Basic Mobility Standardized Score 38.32   [facility] Highest Level Of Mobility   -HL Goal 5: Stand one or more mins   -HLM Achieved 7: Walk 25 feet or more   Barthel Index   Feeding 10   Bathing 0   Grooming Score 0   Dressing Score 5   Bladder Score 10   Bowels Score 10   Toilet Use Score 5   Transfers (Bed/Chair) Score 10   Mobility (Level Surface) Score 0   Stairs Score 0   Barthel Index Score 50   Additional Treatment Session   Start Time 1154   End Time 1204   Treatment Assessment S: Pt agreeable to PT session this afternoon. O/A: Able to perform exercise as mentioned below with intemittent assist/demo to improve form/ROM. Pt with mild fatigue at EOS. Repositioned in bedside chair with all needs within reach. P: pt will benefit from skilled PT to address deficits to maximize IND for safe d/c   Exercises   Neuro re-ed Able to perform seated exercise including ankle pumps, LAQ, seated hip marches, hip add squeezes x 10 reps bilat   End of Consult   Patient Position at End of Consult Bedside chair;All needs within reach   Licensure   [place] License Number  Sandie Card  [id number]

## 2024-07-12 NOTE — CASE MANAGEMENT
Case Management Assessment & Discharge Planning Note    Patient name Edwin Elkins  Location [address] [place] [address]-* MRN [id number]  : 1932 Date 2024       Current Admission Date: 2024  Current Admission Diagnosis:Acute cholecystitis due to biliary calculus   Patient Active Problem List    Diagnosis Date Noted Date Diagnosed    Perforated gallbladder 2024     Acute cholecystitis due to biliary calculus 2024     Bile peritonitis (HCC) 2024     Extrahepatic biloma 2024     Parkinson's disease with dyskinesia 2024     Sepsis (HCC) 2024     Moderate protein-calorie malnutrition (HCC) 2024     Venous insufficiency of both lower extremities 2022     Chronic deep vein thrombosis, RIGHT LE (DVT) (HCC) 2022       LOS (days): 1  Geometric Mean LOS (GMLOS) (days): 4.4  Days to GMLOS:3.5     OBJECTIVE:    Risk of Unplanned Readmission Score: 11.79         Current admission status: Inpatient  Referral Reason: VNA    Preferred Pharmacy:   [id number] - [place], [place] - [facility]  [address] [address]  Phone: [phone] Fax: [phone]    Primary Care Provider: Nithin Greenberg MD    Primary Insurance: [facility]  Secondary Insurance:     ASSESSMENT:  Active Health Care Proxies    There are no active Health Care Proxies on file.  Readmission Root Cause  30 Day Readmission: No    Patient Information  Admitted from:: Home  Mental Status: Alert  During Assessment patient was accompanied by: Not accompanied during assessment  Assessment information provided by:: Patient  Primary Caregiver: Spouse  Caregiver's Name:: Rachele Elkins (spouse)  Caregiver's Relationship to Patient:: Family Member  Caregiver's Telephone Number:: [phone]  Support Systems: Spouse/significant other  County of Residence: [place]  What city do you live in?: [place]  Home entry access options. Select all  that apply.: No steps to enter home, Garage  Type of Current Residence: 2 story home  Upon entering residence, is there a bedroom on the main floor (no further steps)?: No  A bedroom is located on the following floor levels of residence (select all that apply):: 2nd Floor  Upon entering residence, is there a bathroom on the main floor (no further steps)?: No (Tub/shower w/ grab bars; no seating)  Indicate which floors of current residence have a bathroom (select all the apply):: 2nd Floor  Number of steps to 2nd floor from main floor: One Flight  Living Arrangements: Lives w/ Spouse/significant other    Activities of Daily Living Prior to Admission  Functional Status: Independent  Completes ADLs independently?: Yes  Ambulates independently?: Yes  Does patient use assisted devices?: Yes  Assisted Devices (DME) used: Walker, Rollator, Straight Cane  Does patient currently own DME?: Yes  What DME does the patient currently own?: Rollator, Straight Cane, Walker  Does patient currently have HHC?: No     Patient Information Continued  Income Source: Pension/shelter  Does patient have prescription coverage?: Yes  Does patient receive dialysis treatments?: No     Means of Transportation  Means of Transport to Appts:: Family transport    Social Determinants of Health (SDOH)      Flowsheet Row Most Recent Value   Housing Stability    In the last 12 months, was there a time when you were not able to pay the mortgage or rent on time? N   In the past 12 months, how many times have you moved where you were living? 0   At any time in the past 12 months, were you homeless or living in a shelter (including now)? N   Transportation Needs    In the past 12 months, has lack of transportation kept you from medical appointments or from getting medications? no   In the past 12 months, has lack of transportation kept you from meetings, work, or from getting things needed for daily living? No   Food Insecurity    Within the past 12  months, you worried that your food would run out before you got the money to buy more. Never true   Within the past 12 months, the food you bought just didn't last and you didn't have money to get more. Never true   Utilities    In the past 12 months has the electric, gas, oil, or water company threatened to shut off services in your home? No          DISCHARGE DETAILS:    Discharge planning discussed with:: Patient  Freedom of Choice: Yes  Comments - Freedom of Choice: SW met bedside with patient to introduce role, complete assessment, and discuss discharge plan. SW advised of recommendation by care team for a visiting nurse at patient will need assistance with drain care. Patient verbalized understanding and consented to blanket referrals.  CM contacted family/caregiver?: Yes (Voicemail left for spouse introducing role and requesting callback to discuss discharge plan.)  Were Treatment Team discharge recommendations reviewed with patient/caregiver?: Yes  Did patient/caregiver verbalize understanding of patient care needs?: Yes  Were patient/caregiver advised of the risks associated with not following Treatment Team discharge recommendations?: Yes    Contacts  Patient Contacts: Rachele Elkins (spouse)  Relationship to Patient:: Family  Contact Method: Phone  Phone Number: [phone]  Reason/Outcome: Emergency Contact    Requested Home Health Care         Is the patient interested in HHC at discharge?: Yes  Home Health Discipline requested:: Nursing, Occupational Therapy, Physical Therapy  Home Health Agency Name:: Other  HHA External Referral Reason (only applicable if external HHA name selected): Services not provided in network or near patient location  Home Health Follow-Up Provider:: PCP  Home Health Services Needed:: Evaluate Functional Status and Safety, Gait/ADL Training, Strengthening/Theraputic Exercises to Improve Function, Other (comment) (drain care)  Homebound Criteria Met:: Requires the Assistance of  Another Person for Safe Ambulation or to Leave the Home, Uses an Assist Device (i.e. cane, walker, etc)  Supporting Clincal Findings:: Fatigues Easliy in Short Distances, Limited Endurance     Other Referral/Resources/Interventions Provided:  Interventions: HHC  Referral Comments: Arimo C referrals sent in [place]. Responses pending.    Discharge Destination Plan:: Home with Home Health Care  Transport at Discharge : Family

## 2024-07-13 LAB
ALBUMIN SERPL BCG-MCNC: 2.4 G/DL (ref 3.5–5)
ALP SERPL-CCNC: 53 U/L (ref 34–104)
ALT SERPL W P-5'-P-CCNC: <3 U/L (ref 7–52)
ANION GAP SERPL CALCULATED.3IONS-SCNC: 7 MMOL/L (ref 4–13)
AST SERPL W P-5'-P-CCNC: 11 U/L (ref 13–39)
BILIRUB SERPL-MCNC: 1.1 MG/DL (ref 0.2–1)
BUN SERPL-MCNC: 38 MG/DL (ref 5–25)
CALCIUM ALBUM COR SERPL-MCNC: 9.2 MG/DL (ref 8.3–10.1)
CALCIUM SERPL-MCNC: 7.9 MG/DL (ref 8.4–10.2)
CHLORIDE SERPL-SCNC: 109 MMOL/L (ref 96–108)
CO2 SERPL-SCNC: 23 MMOL/L (ref 21–32)
CREAT SERPL-MCNC: 0.87 MG/DL (ref 0.6–1.3)
DME PARACHUTE DELIVERY DATE REQUESTED: NORMAL
DME PARACHUTE DELIVERY NOTE: NORMAL
DME PARACHUTE ITEM DESCRIPTION: NORMAL
DME PARACHUTE ORDER STATUS: NORMAL
DME PARACHUTE SUPPLIER NAME: NORMAL
DME PARACHUTE SUPPLIER PHONE: NORMAL
ERYTHROCYTE [DISTWIDTH] IN BLOOD BY AUTOMATED COUNT: 13.4 % (ref 11.6–15.1)
GFR SERPL CREATININE-BSD FRML MDRD: 75 ML/MIN/1.73SQ M
GLUCOSE SERPL-MCNC: 140 MG/DL (ref 65–140)
HCT VFR BLD AUTO: 38.8 % (ref 36.5–49.3)
HGB BLD-MCNC: 13 G/DL (ref 12–17)
MCH RBC QN AUTO: 33.3 PG (ref 26.8–34.3)
MCHC RBC AUTO-ENTMCNC: 33.5 G/DL (ref 31.4–37.4)
MCV RBC AUTO: 100 FL (ref 82–98)
PLATELET # BLD AUTO: 120 THOUSANDS/UL (ref 149–390)
PMV BLD AUTO: 11 FL (ref 8.9–12.7)
POTASSIUM SERPL-SCNC: 3.5 MMOL/L (ref 3.5–5.3)
PROT SERPL-MCNC: 4.8 G/DL (ref 6.4–8.4)
RBC # BLD AUTO: 3.9 MILLION/UL (ref 3.88–5.62)
SODIUM SERPL-SCNC: 139 MMOL/L (ref 135–147)
WBC # BLD AUTO: 8.91 THOUSAND/UL (ref 4.31–10.16)

## 2024-07-13 PROCEDURE — 80053 COMPREHEN METABOLIC PANEL: CPT | Performed by: PHYSICIAN ASSISTANT

## 2024-07-13 PROCEDURE — 97535 SELF CARE MNGMENT TRAINING: CPT

## 2024-07-13 PROCEDURE — 85027 COMPLETE CBC AUTOMATED: CPT | Performed by: PHYSICIAN ASSISTANT

## 2024-07-13 PROCEDURE — 97167 OT EVAL HIGH COMPLEX 60 MIN: CPT

## 2024-07-13 PROCEDURE — 97530 THERAPEUTIC ACTIVITIES: CPT

## 2024-07-13 PROCEDURE — 99232 SBSQ HOSP IP/OBS MODERATE 35: CPT | Performed by: SURGERY

## 2024-07-13 RX ORDER — AMOXICILLIN AND CLAVULANATE POTASSIUM 875; 125 MG/1; MG/1
1 TABLET, FILM COATED ORAL EVERY 12 HOURS SCHEDULED
Status: DISCONTINUED | OUTPATIENT
Start: 2024-07-13 | End: 2024-07-16 | Stop reason: HOSPADM

## 2024-07-13 RX ADMIN — PRAMIPEXOLE DIHYDROCHLORIDE 0.12 MG: 0.25 TABLET ORAL at 09:29

## 2024-07-13 RX ADMIN — PRAMIPEXOLE DIHYDROCHLORIDE 0.12 MG: 0.25 TABLET ORAL at 21:12

## 2024-07-13 RX ADMIN — AMOXICILLIN AND CLAVULANATE POTASSIUM 1 TABLET: 875; 125 TABLET, FILM COATED ORAL at 21:12

## 2024-07-13 RX ADMIN — SODIUM CHLORIDE 100 ML/HR: 0.9 INJECTION, SOLUTION INTRAVENOUS at 01:54

## 2024-07-13 RX ADMIN — CARBIDOPA AND LEVODOPA 1 TABLET: 25; 100 TABLET ORAL at 17:43

## 2024-07-13 RX ADMIN — PRAMIPEXOLE DIHYDROCHLORIDE 0.12 MG: 0.25 TABLET ORAL at 17:44

## 2024-07-13 RX ADMIN — PIPERACILLIN AND TAZOBACTAM 4.5 G: 4; .5 INJECTION, POWDER, FOR SOLUTION INTRAVENOUS at 09:45

## 2024-07-13 RX ADMIN — HEPARIN SODIUM 5000 UNITS: 5000 INJECTION INTRAVENOUS; SUBCUTANEOUS at 05:19

## 2024-07-13 RX ADMIN — HEPARIN SODIUM 5000 UNITS: 5000 INJECTION INTRAVENOUS; SUBCUTANEOUS at 21:12

## 2024-07-13 RX ADMIN — AMOXICILLIN AND CLAVULANATE POTASSIUM 1 TABLET: 875; 125 TABLET, FILM COATED ORAL at 13:33

## 2024-07-13 RX ADMIN — CARBIDOPA AND LEVODOPA 1 TABLET: 25; 100 TABLET ORAL at 21:12

## 2024-07-13 RX ADMIN — HEPARIN SODIUM 5000 UNITS: 5000 INJECTION INTRAVENOUS; SUBCUTANEOUS at 13:34

## 2024-07-13 RX ADMIN — PIPERACILLIN AND TAZOBACTAM 4.5 G: 4; .5 INJECTION, POWDER, FOR SOLUTION INTRAVENOUS at 01:52

## 2024-07-13 RX ADMIN — CARBIDOPA AND LEVODOPA 1 TABLET: 25; 100 TABLET ORAL at 09:29

## 2024-07-13 NOTE — PLAN OF CARE
Problem: Prexisting or High Potential for Compromised Skin Integrity  Goal: Skin integrity is maintained or improved  Description: INTERVENTIONS:  - Identify patients at risk for skin breakdown  - Assess and monitor skin integrity  - Assess and monitor nutrition and hydration status  - Monitor labs   - Assess for incontinence   - Turn and reposition patient  - Assist with mobility/ambulation  - Relieve pressure over bony prominences  - Avoid friction and shearing  - Provide appropriate hygiene as needed including keeping skin clean and dry  - Evaluate need for skin moisturizer/barrier cream  - Collaborate with interdisciplinary team   - Patient/family teaching  - Consider wound care consult   7/13/2024 0322 by Tanvi Mcelroy RN  Outcome: Progressing  7/13/2024 0319 by Tanvi Mcelroy RN  Outcome: Progressing     Problem: PAIN - ADULT  Goal: Verbalizes/displays adequate comfort level or baseline comfort level  Description: Interventions:  - Encourage patient to monitor pain and request assistance  - Assess pain using appropriate pain scale  - Administer analgesics based on type and severity of pain and evaluate response  - Implement non-pharmacological measures as appropriate and evaluate response  - Consider cultural and social influences on pain and pain management  - Notify physician/advanced practitioner if interventions unsuccessful or patient reports new pain  Outcome: Progressing     Problem: INFECTION - ADULT  Goal: Absence or prevention of progression during hospitalization  Description: INTERVENTIONS:  - Assess and monitor for signs and symptoms of infection  - Monitor lab/diagnostic results  - Monitor all insertion sites, i.e. indwelling lines, tubes, and drains  - Monitor endotracheal if appropriate and nasal secretions for changes in amount and color  - [place] appropriate cooling/warming therapies per order  - Administer medications as ordered  - Instruct and encourage patient and family to use  good hand hygiene technique  - Identify and instruct in appropriate isolation precautions for identified infection/condition  Outcome: Progressing     Problem: DISCHARGE PLANNING  Goal: Discharge to home or other facility with appropriate resources  Description: INTERVENTIONS:  - Identify barriers to discharge w/patient and caregiver  - Arrange for needed discharge resources and transportation as appropriate  - Identify discharge learning needs (meds, wound care, etc.)  - Arrange for interpretive services to assist at discharge as needed  - Refer to Case Management Department for coordinating discharge planning if the patient needs post-hospital services based on physician/advanced practitioner order or complex needs related to functional status, cognitive ability, or social support system  Outcome: Progressing

## 2024-07-13 NOTE — OCCUPATIONAL THERAPY NOTE
Occupational Therapy Evaluation/Treatment     07/13/24 6680   Note Type   Note type Evaluation   Pain Assessment   Pain Assessment Tool 0-10   Pain Score No Pain   Restrictions/Precautions   Weight Bearing Precautions Per Order No   Other Precautions Fall Risk;Chair Alarm;Bed Alarm   Home Living   Type of Home House   Home Layout Two level;Bed/bath upstairs;Stairs to enter with rails  (Per wife pt prefers to stay in family room in 1st level (where TV is located) but with no bathroom access. Pt has to go upstairs and manage 8 + 4 steps where bathroom is located)   Bathroom Shower/Tub Tub/shower  (Per wife, pt takes bath in tub and not shower as pt unable to stand long enough for a shower.)   Bathroom Toilet Standard   Bathroom Equipment Grab bars in shower;Other (Comment)  (Per wife, pt takes bath in tub and not shower as pt unable to stand long enough for a shower.)   Home Equipment Walker;Grab bars   Additional Comments Pt is a 89 y.o male who presented with abdominal pain. Dx with Acute cholecystitis due to biliary calculus and s/p IR drainage of gallbladder and now has choly tube placed 7/11.   Prior Function   Level of [place] Independent with ADLs;Independent with functional mobility;Needs assistance with IADLS   Lives With Spouse   Receives Help From Family   IADLs Family/Friend/Other provides transportation;Family/Friend/Other provides meals;Family/Friend/Other provides medication management   Falls in the last 6 months 1 to 4   Vocational Retired   Comments Per pt and wife, pt able to do basic self care tasks independently and functional mob with mod indp using AD/RW although pt has had 2-3 falls within 6 months. Pt does not drive and wife does most iADLs.   Lifestyle   Intrinsic Gratification pt enjoys company of cat Neela and likes to watch TV   General   Family/Caregiver Present Yes  (wife Rachele)   Subjective   Subjective I can go to the bathroom   ADL   Eating Assistance 6  Modified independent    Grooming Assistance 4  Minimal Assistance   UB Bathing Assistance Unable to assess   LB Bathing Assistance Unable to assess   UB Dressing Assistance 4  Minimal Assistance   LB Dressing Assistance 3  Moderate Assistance   Toileting Assistance  4  Minimal Assistance   Toileting Deficit Steadying;Clothing management up;Clothing management down;Perineal hygiene   Bed Mobility   Additional Comments Pt received seated in bedside chair   Transfers   Sit to Stand 4  Minimal assistance   Additional items Assist x 1;Armrests;Increased time required;Verbal cues   Stand to Sit 4  Minimal assistance   Additional items Assist x 1;Verbal cues;Armrests;Increased time required   Toilet transfer 4  Minimal assistance   Additional items Assist x 1;Armrests;Increased time required;Verbal cues;Standard toilet   Balance   Static Sitting Fair   Dynamic Sitting Fair -   Static Standing Fair -   Dynamic Standing Fair -   Activity Tolerance   Activity Tolerance Patient limited by fatigue;Patient tolerated treatment well   Nurse Made Aware yes, RN Gen   RUE Assessment   RUE Assessment WFL  (both Shoulder AROM approximately 40-45 deg)   LUE Assessment   LUE Assessment WFL   Cognition   Arousal/Participation Alert;Responsive   Attention Attends with cues to redirect   Orientation Level Oriented to person;Disoriented to place;Oriented to time;Disoriented to situation   Following Commands Follows multistep commands with increased time or repetition   Assessment   Limitation Decreased ADL status;Decreased UE ROM;Decreased UE strength;Decreased self-care trans;Decreased endurance;Decreased high-level ADLs   Prognosis Good   Assessment Patient evaluated by Occupational Therapy.  Patient admitted with Acute cholecystitis due to biliary calculus.  The patients occupational profile, medical and therapy history includes a extensive additional review of physical, cognitive, or psychosocial history related to current functional performance.   Comorbidities affecting functional mobility and ADLS include:DVT and Parkinsons .  Prior to admission, patient was independent with functional mobility with AD/DME, independent with ADLS, requiring assist for IADLS, ambulating household distance, retired, and home with family assist.  The evaluation identifies the following performance deficits: weakness, decreased endurance, decreased coordination, increased fall risk, decreased ADLS, decreased activity tolerance, decreased safety awareness, decreased cognition, and decreased strength, that result in activity limitations and/or participation restrictions. This evaluation requires clinical decision making of high complexity, because the patient presents with comorbidites that affect occupational performance and required significant modification of tasks or assistance with consideration of multiple treatment options.  The Barthel Index was used as a functional outcome tool presenting with a score of Barthel Index Score: 50, indicating marked limitations of functional mobility and ADLS.  The patient's raw score on the AM-PAC Daily Activity Inpatient Short Form is 15. A raw score of less than 19 suggests the patient may benefit from discharge to post-acute rehabilitation services. Please refer to the recommendation of the Occupational Therapist for safe discharge planning. Patient will benefit from skilled Occupational Therapy services to address above deficits and facilitate a safe return to prior level of function.   Goals   Patient Goals go home   STG Time Frame   (1-7 days)   Short Term Goal  Goals established to promote Patient Goals: go home:  Eating: independent; Grooming: supervision seated; Bathing: min assist; Upper Body Dressing supervision; Lower Body Dressing: min assist; Toileting: min assist; Patient will increase ambulatory standard toilet transfer to supervision with rolling walker to increase performance and safety with ADLS and functional mobility;  Patient will increase standing tolerance to 5 minutes during ADL task to decrease assistance level and decrease fall risk; Patient will increase bed mobility to min assist in preparation for ADLS and transfers; Patient will increase functional mobility to and from bathroom with rolling walker with supervision to increase performance with ADLS and to use a toilet; Patient will tolerate 5 minutes of UE ROM/strengthening to increase general activity tolerance and performance in ADLS/IADLS; Patient will improve functional activity tolerance to 5 minutes of sustained functional tasks to increase participation in basic self-care and decrease assistance level;  Patient will be able to to verbalize understanding and perform energy conservation/proper body mechanics during ADLS and functional mobility at least 75% of the time with minimal cueing to decrease signs of fatigue and increase stamina to return to prior level of function; Patient will increase static sitting balance to fair+ to improve the ability to sit at edge of bed or on a chair for ADLS;  Patient will increase static standing balance to fair to improve postural stability and decrease fall risk during standing ADLS and transfers.   LTG Time Frame   (8-14 days)   Long Term Goal Grooming: independent seated; Bathing: supervision; Upper Body Dressing independent; Lower Body Dressing: supervision; Toileting: supervision; Patient will increase ambulatory standard toilet transfer to independent with rolling walker to increase performance and safety with ADLS and functional mobility; Patient will increase standing tolerance to 7 minutes during ADL task to decrease assistance level and decrease fall risk; Patient will increase bed mobility to supervision in preparation for ADLS and transfers; Patient will increase functional mobility to and from bathroom with rolling walker independently to increase performance with ADLS and to use a toilet; Patient will tolerate 10  minutes of UE ROM/strengthening to increase general activity tolerance and performance in ADLS/IADLS; Patient will improve functional activity tolerance to 10 minutes of sustained functional tasks to increase participation in basic self-care and decrease assistance level;  Patient will be able to to verbalize understanding and perform energy conservation/proper body mechanics during ADLS and functional mobility at least 90% of the time with no cueing to decrease signs of fatigue and increase stamina to return to prior level of function; Patient will increase dynamic sitting balance to fair+ to improve the ability to sit at edge of bed or on a chair for ADLS;  Patient will increase static standing balance to good to improve postural stability and decrease fall risk during standing ADLS and transfers.  Pt will score >/= 18/24 on AM-PAC Daily Activity Inpatient scale to promote safe independence with ADLs and functional mobility; Pt will score >/= 80/100 on Barthel Index in order to decrease caregiver assistance needed and increase ability to perform ADLs and functional mobility.   Plan   Treatment Interventions ADL retraining   Goal Expiration Date 07/27/24   OT Frequency 3-5x/wk   Discharge Recommendation   Rehab Resource Intensity Level, OT II (Moderate Resource Intensity)   Equipment Recommended Bedside commode   AM-PAC Daily Activity Inpatient   Lower Body Dressing 2   Bathing 2   Toileting 2   Upper Body Dressing 3   Grooming 3   Eating 3   Daily Activity Raw Score 15   Daily Activity Standardized Score (Calc for Raw Score >=11) 34.69   AM-PAC Applied Cognition Inpatient   Following a Speech/Presentation 3   Understanding Ordinary Conversation 3   Taking Medications 3   Remembering Where Things Are Placed or Put Away 3   Remembering List of 4-5 Errands 3   Taking Care of Complicated Tasks 2   Applied Cognition Raw Score 17   Applied Cognition Standardized Score 36.52   Barthel Index   Feeding 10   Bathing 0  "  Grooming Score 0   Dressing Score 5   Bladder Score 10   Bowels Score 10   Toilet Use Score 5   Transfers (Bed/Chair) Score 10   Mobility (Level Surface) Score 0   Stairs Score 0   Barthel Index Score 50   Additional Treatment Session   Start Time 1417   End Time 1440   Treatment Assessment S: \"I have to go to the bathroom\" O: Pt received seated in bedside chair with wife present and pt agreeable to OT session. Patient ambulated short household distance to/from bathroom with RW and min A and moderate verbal/visual/tactile cues for safe management of RW during ambulation and transfers. Toilet hygiene completed with min A and clothing mgt  with  min A while instance.   Per wife pt prefers to stay in family room in 1st level (where TV is located) but with no bathroom access. Pt has to go upstairs and manage 8 + 4 steps where bathroom is located. Per pt  and wife pt has had approximately 3 falls for the past 6 months. A: Discussed with pt and wife use of DME/commode, OT demonstrated and recommended use of bedside commode for pt safety and  increase ease during toileting task. OT informed  Rosibel of this need and as per CM she will be in touch with pt's wife.  Per wife, pt takes bath in tub and not shower as pt unable to stand long enough for a shower. Will discuss with  pt and wife use of AD/DME for shower to increase ease and safety. At end of session patient return seated in chair with all needs met P: Continue with OT POC and recommending STR for safe return to baseline function.   End of Consult   Education Provided Yes;Family or social support of family present for education by provider   Patient Position at End of Consult Bedside chair;Bed/Chair alarm activated;All needs within reach   Nurse Communication Nurse aware of consult  (RN Gen)   Licensure   [place] License Number  Mary Rose E. Blanes, MS, OTR/L     "

## 2024-07-13 NOTE — CASE MANAGEMENT
Case Management Discharge Planning Note    Patient name Edwin Elkins  Location [address] [place] [address] [place] [address]-* MRN [id number]  : 1932 Date 2024       Current Admission Date: 2024  Current Admission Diagnosis:Acute cholecystitis due to biliary calculus   Patient Active Problem List    Diagnosis Date Noted Date Diagnosed    Perforated gallbladder 2024     Acute cholecystitis due to biliary calculus 2024     Bile peritonitis (HCC) 2024     Extrahepatic biloma 2024     Parkinson's disease with dyskinesia 2024     Sepsis (HCC) 2024     Moderate protein-calorie malnutrition (HCC) 2024     Venous insufficiency of both lower extremities 2022     Chronic deep vein thrombosis, RIGHT LE (DVT) (HCC) 2022       LOS (days): 2  Geometric Mean LOS (GMLOS) (days): 4.4  Days to GMLOS:2.3     OBJECTIVE:  Risk of Unplanned Readmission Score: 11.51       Current admission status: Inpatient   [facility]   [facility]  [address] [place] [address]  Phone: [phone] Fax: [phone]    Primary Care Provider: Nithin Greenberg MD    Primary Insurance: [facility]  Secondary Insurance:     DISCHARGE DETAILS:    Discharge planning discussed with:: Jazlyn Jeffrey  Freedom of Choice: Yes    Comments - Freedom of Choice: ASTRID spoke with wife Rachele at bedside to discuss OT recommendation for a bedside commode.  Commode was ordered in [place] and will delivered from [employer] when approved.      Rachele acknowledged receiving MJ Hooper's voice mail and plans to return the call.  ASTRID discussed therapy recommendation and referral for STR and Rachele is in agreement with plan but wants to talk with her son who will be arriving tomorrow to visit for a few days.  CM will provide patient choice list when referral has closed.      CM contacted family/caregiver?: Yes  Were Treatment Team  discharge recommendations reviewed with patient/caregiver?: Yes  Did patient/caregiver verbalize understanding of patient care needs?: Yes  Were patient/caregiver advised of the risks associated with not following Treatment Team discharge recommendations?: Yes    Contacts  Patient Contacts: Rachele Elkins (spouse)  Relationship to Patient:: Family  Contact Method: In Person  Reason/Outcome: Emergency Contact    DME Referral Provided  Referral made for DME?: Yes  DME referral completed for the following items:: Bedside Commode  DME Supplier Name:: [employer]    Other Referral/Resources/Interventions Provided:  Interventions: HHC, DME, Short Term Rehab    Would you like to participate in our [facility] service program?  : No - Declined    Treatment Team Recommendation: Short Term Rehab  Discharge Destination Plan:: Home with Home Health Care, Short Term Rehab

## 2024-07-13 NOTE — PROGRESS NOTES
"Progress Note - General Surgery   Edwin Elkins 89 y.o. male MRN: [id number]  Unit/Bed#: [address] [place] [address] Encounter: [id number]    Assessment:  89 year-old-male with a history of Parkinson's disease HD 1 for calculous gallbladder perforation s/p IR drainage of gallbladder and perihepatic space.     WBC: 8.91 (10.12)  T bili: 1.10 (1.06)   Cholecystostomy drain output: 150cc  Perihepatic drain output 10cc    Plan:  OOB and ambulate.   Discontinue IVF.  Transition to PO Augmentin. Total antibiotic course 14 days.   Tolerating regular diet.   PT/OT  PRN Analgesics.    Subjective/Objective   Chief Complaint: Calculous gallbladder perforation    Subjective: Patient seen examined bedside.  Patient reports no acute changes through the night.  Patient reports tolerating diet with no difficulties.       Objective:       Blood pressure 123/69, pulse 67, temperature 97.7 °F (36.5 °C), resp. rate 18, height 5' 10\" (1.778 m), weight 58.1 kg (128 lb), SpO2 95%.,Body mass index is 18.37 kg/m².      Intake/Output Summary (Last 24 hours) at 7/13/2024 0901  Last data filed at 7/13/2024 0501  Gross per 24 hour   Intake 10 ml   Output 610 ml   Net -600 ml       Invasive Devices       Peripheral Intravenous Line  Duration             Peripheral IV 07/12/24 Left;Ventral (anterior) Forearm <1 day              Drain  Duration             Abscess Drain Abdomen 1 day    Cholecystostomy Tube 1 day                    Physical Exam:   Gen: Awake, alert and coherent, NAD, lying in bed. Cachectic, non-toxic, non-diaphoretic  HEENT: normocephalic, atraumatic, PERRL, no scleral icterus   Heart: RRR no murmurs  Lungs: normal respiratory effort, CTA bilaterally   Abdomen: BS x 4, flat, NTTP. Gauze bandages on right side appear clean and dry without saturation. Abdomen is firm to palpation, but no peritoneal signs, masses, or guarding  Neuro: baseline tremor, slowed speech    Lab, Imaging and other studies:I have personally reviewed pertinent lab " results.  , CBC:   Lab Results   Component Value Date    WBC 8.91 07/13/2024    HGB 13.0 07/13/2024    HCT 38.8 07/13/2024     (H) 07/13/2024     (L) 07/13/2024    RBC 3.90 07/13/2024    MCH 33.3 07/13/2024    MCHC 33.5 07/13/2024    RDW 13.4 07/13/2024    MPV 11.0 07/13/2024   , CMP:   Lab Results   Component Value Date    SODIUM 139 07/13/2024    K 3.5 07/13/2024     (H) 07/13/2024    CO2 23 07/13/2024    BUN 38 (H) 07/13/2024    CREATININE 0.87 07/13/2024    CALCIUM 7.9 (L) 07/13/2024    AST 11 (L) 07/13/2024    ALT <3 (L) 07/13/2024    ALKPHOS 53 07/13/2024    EGFR 75 07/13/2024     VTE Pharmacologic Prophylaxis: Heparin  VTE Mechanical Prophylaxis: sequential compression device

## 2024-07-13 NOTE — PLAN OF CARE
Problem: Potential for Falls  Goal: Patient will remain free of falls  Description: INTERVENTIONS:  - Educate patient/family on patient safety including physical limitations  - Instruct patient to call for assistance with activity   - Consult OT/PT to assist with strengthening/mobility   - Keep Call bell within reach  - Keep bed low and locked with side rails adjusted as appropriate  - Keep care items and personal belongings within reach  - Initiate and maintain comfort rounds  - Make Fall Risk Sign visible to staff  - Offer Toileting every 2 Hours, in advance of need  - Initiate/Maintain bed alarm  - Obtain necessary fall risk management equipment: yellow socks.  - Apply yellow socks and bracelet for high fall risk patients  - Consider moving patient to room near nurses station  Outcome: Progressing

## 2024-07-14 LAB
ALBUMIN SERPL BCG-MCNC: 2.4 G/DL (ref 3.5–5)
ALP SERPL-CCNC: 52 U/L (ref 34–104)
ALT SERPL W P-5'-P-CCNC: 4 U/L (ref 7–52)
ANION GAP SERPL CALCULATED.3IONS-SCNC: 5 MMOL/L (ref 4–13)
AST SERPL W P-5'-P-CCNC: 11 U/L (ref 13–39)
BACTERIA SPEC ANAEROBE CULT: NORMAL
BACTERIA SPEC ANAEROBE CULT: NORMAL
BACTERIA SPEC BFLD CULT: ABNORMAL
BACTERIA SPEC BFLD CULT: ABNORMAL
BILIRUB SERPL-MCNC: 1.04 MG/DL (ref 0.2–1)
BUN SERPL-MCNC: 25 MG/DL (ref 5–25)
CALCIUM ALBUM COR SERPL-MCNC: 9.3 MG/DL (ref 8.3–10.1)
CALCIUM SERPL-MCNC: 8 MG/DL (ref 8.4–10.2)
CHLORIDE SERPL-SCNC: 110 MMOL/L (ref 96–108)
CO2 SERPL-SCNC: 24 MMOL/L (ref 21–32)
CREAT SERPL-MCNC: 0.68 MG/DL (ref 0.6–1.3)
ERYTHROCYTE [DISTWIDTH] IN BLOOD BY AUTOMATED COUNT: 13.2 % (ref 11.6–15.1)
GFR SERPL CREATININE-BSD FRML MDRD: 83 ML/MIN/1.73SQ M
GLUCOSE SERPL-MCNC: 132 MG/DL (ref 65–140)
GRAM STN SPEC: ABNORMAL
GRAM STN SPEC: ABNORMAL
HCT VFR BLD AUTO: 37.5 % (ref 36.5–49.3)
HGB BLD-MCNC: 12.9 G/DL (ref 12–17)
MCH RBC QN AUTO: 33.5 PG (ref 26.8–34.3)
MCHC RBC AUTO-ENTMCNC: 34.4 G/DL (ref 31.4–37.4)
MCV RBC AUTO: 97 FL (ref 82–98)
PLATELET # BLD AUTO: 146 THOUSANDS/UL (ref 149–390)
PMV BLD AUTO: 10.4 FL (ref 8.9–12.7)
POTASSIUM SERPL-SCNC: 3.6 MMOL/L (ref 3.5–5.3)
PROT SERPL-MCNC: 4.8 G/DL (ref 6.4–8.4)
RBC # BLD AUTO: 3.85 MILLION/UL (ref 3.88–5.62)
SODIUM SERPL-SCNC: 139 MMOL/L (ref 135–147)
WBC # BLD AUTO: 9.53 THOUSAND/UL (ref 4.31–10.16)

## 2024-07-14 PROCEDURE — 80053 COMPREHEN METABOLIC PANEL: CPT | Performed by: PHYSICIAN ASSISTANT

## 2024-07-14 PROCEDURE — 85027 COMPLETE CBC AUTOMATED: CPT | Performed by: PHYSICIAN ASSISTANT

## 2024-07-14 PROCEDURE — 97535 SELF CARE MNGMENT TRAINING: CPT

## 2024-07-14 PROCEDURE — 99232 SBSQ HOSP IP/OBS MODERATE 35: CPT | Performed by: SURGERY

## 2024-07-14 RX ADMIN — HEPARIN SODIUM 5000 UNITS: 5000 INJECTION INTRAVENOUS; SUBCUTANEOUS at 21:38

## 2024-07-14 RX ADMIN — CARBIDOPA AND LEVODOPA 1 TABLET: 25; 100 TABLET ORAL at 17:39

## 2024-07-14 RX ADMIN — Medication 2.5 MG: at 04:08

## 2024-07-14 RX ADMIN — CARBIDOPA AND LEVODOPA 1 TABLET: 25; 100 TABLET ORAL at 09:44

## 2024-07-14 RX ADMIN — AMOXICILLIN AND CLAVULANATE POTASSIUM 1 TABLET: 875; 125 TABLET, FILM COATED ORAL at 09:44

## 2024-07-14 RX ADMIN — CARBIDOPA AND LEVODOPA 1 TABLET: 25; 100 TABLET ORAL at 21:38

## 2024-07-14 RX ADMIN — PRAMIPEXOLE DIHYDROCHLORIDE 0.12 MG: 0.25 TABLET ORAL at 17:39

## 2024-07-14 RX ADMIN — HEPARIN SODIUM 5000 UNITS: 5000 INJECTION INTRAVENOUS; SUBCUTANEOUS at 14:44

## 2024-07-14 RX ADMIN — AMOXICILLIN AND CLAVULANATE POTASSIUM 1 TABLET: 875; 125 TABLET, FILM COATED ORAL at 21:38

## 2024-07-14 RX ADMIN — HEPARIN SODIUM 5000 UNITS: 5000 INJECTION INTRAVENOUS; SUBCUTANEOUS at 05:49

## 2024-07-14 RX ADMIN — PRAMIPEXOLE DIHYDROCHLORIDE 0.12 MG: 0.25 TABLET ORAL at 09:44

## 2024-07-14 RX ADMIN — PRAMIPEXOLE DIHYDROCHLORIDE 0.12 MG: 0.25 TABLET ORAL at 21:38

## 2024-07-14 NOTE — PLAN OF CARE
Problem: Potential for Falls  Goal: Patient will remain free of falls  Description: INTERVENTIONS:  - Educate patient/family on patient safety including physical limitations  - Instruct patient to call for assistance with activity   - Consult OT/PT to assist with strengthening/mobility   - Keep Call bell within reach  - Keep bed low and locked with side rails adjusted as appropriate  - Keep care items and personal belongings within reach  - Initiate and maintain comfort rounds  - Make Fall Risk Sign visible to staff  - Offer Toileting every 2 Hours, in advance of need  - Initiate/Maintain bed alarm  - Obtain necessary fall risk management equipment: yellow socks  Problem: Prexisting or High Potential for Compromised Skin Integrity  Goal: Skin integrity is maintained or improved  Description: INTERVENTIONS:  - Identify patients at risk for skin breakdown  - Assess and monitor skin integrity  - Assess and monitor nutrition and hydration status  - Monitor labs   - Assess for incontinence   - Turn and reposition patient  - Assist with mobility/ambulation  - Relieve pressure over bony prominences  - Avoid friction and shearing  - Provide appropriate hygiene as needed including keeping skin clean and dry  - Evaluate need for skin moisturizer/barrier cream  - Collaborate with interdisciplinary team   - Patient/family teaching  - Consider wound care consult   Outcome: Progressing     Problem: PAIN - ADULT  Goal: Verbalizes/displays adequate comfort level or baseline comfort level  Description: Interventions:  - Encourage patient to monitor pain and request assistance  - Assess pain using appropriate pain scale  - Administer analgesics based on type and severity of pain and evaluate response  - Implement non-pharmacological measures as appropriate and evaluate response  - Consider cultural and social influences on pain and pain management  - Notify physician/advanced practitioner if interventions unsuccessful or patient  reports new pain  Outcome: Progressing     Problem: DISCHARGE PLANNING  Goal: Discharge to home or other facility with appropriate resources  Description: INTERVENTIONS:  - Identify barriers to discharge w/patient and caregiver  - Arrange for needed discharge resources and transportation as appropriate  - Identify discharge learning needs (meds, wound care, etc.)  - Arrange for interpretive services to assist at discharge as needed  - Refer to Case Management Department for coordinating discharge planning if the patient needs post-hospital services based on physician/advanced practitioner order or complex needs related to functional status, cognitive ability, or social support system  Outcome: Progressing     - Apply yellow socks and bracelet for high fall risk patients  - Consider moving patient to room near nurses station  Outcome: Progressing

## 2024-07-14 NOTE — PROGRESS NOTES
"Progress Note - General Surgery   Edwin Elkins 89 y.o. male MRN: [id number]  Unit/Bed#: [address] [place] [address] Encounter: [id number]    Assessment:  89 year-old-male with a history of Parkinson's disease HD 1 for calculous gallbladder perforation s/p IR drainage 07/11 of gallbladder and perihepatic space.     WBC: 8.91 (10.12)  T bili: 1.10 (1.06)   Cholecystostomy drain output: 100cc  Perihepatic drain output 50cc  Body fluid culture showing Gram negative emily.     Plan:  OOB and ambulate.   Discontinue IVF.  Continue Augmentin.   Tolerating regular diet.   PT/OT  PRN Analgesics.  Body fluid culture pending.     Subjective/Objective   Chief Complaint: Calculous gallbladder perforation    Subjective: Patient seen examined bedside.  Patient reports no acute changes through the night.  Patient reports tolerating diet with no difficulties.       Objective:       Blood pressure 158/88, pulse 81, temperature 98 °F (36.7 °C), resp. rate 16, height 5' 10\" (1.778 m), weight 58.1 kg (128 lb), SpO2 93%.,Body mass index is 18.37 kg/m².      Intake/Output Summary (Last 24 hours) at 7/14/2024 0420  Last data filed at 7/13/2024 2101  Gross per 24 hour   Intake 250 ml   Output 560 ml   Net -310 ml       Invasive Devices       Peripheral Intravenous Line  Duration             Peripheral IV 07/12/24 Left;Ventral (anterior) Forearm 1 day              Drain  Duration             Abscess Drain Abdomen 2 days    Cholecystostomy Tube 2 days                    Physical Exam:   Gen: Awake, alert and coherent, NAD, lying in bed. Cachectic, non-toxic, non-diaphoretic  HEENT: normocephalic, atraumatic, PERRL, no scleral icterus   Heart: RRR no murmurs  Lungs: normal respiratory effort, CTA bilaterally   Abdomen: BS x 4, flat, NTTP. Gauze bandages on right side appear clean and dry without saturation. Abdomen is firm to palpation, but no peritoneal signs, masses, or guarding  Neuro: baseline tremor, slowed speech    Lab, Imaging and other studies:I " have personally reviewed pertinent lab results.  , CBC:   Lab Results   Component Value Date    WBC 8.91 07/13/2024    HGB 13.0 07/13/2024    HCT 38.8 07/13/2024     (H) 07/13/2024     (L) 07/13/2024    RBC 3.90 07/13/2024    MCH 33.3 07/13/2024    MCHC 33.5 07/13/2024    RDW 13.4 07/13/2024    MPV 11.0 07/13/2024   , CMP:   Lab Results   Component Value Date    SODIUM 139 07/13/2024    K 3.5 07/13/2024     (H) 07/13/2024    CO2 23 07/13/2024    BUN 38 (H) 07/13/2024    CREATININE 0.87 07/13/2024    CALCIUM 7.9 (L) 07/13/2024    AST 11 (L) 07/13/2024    ALT <3 (L) 07/13/2024    ALKPHOS 53 07/13/2024    EGFR 75 07/13/2024     VTE Pharmacologic Prophylaxis: Heparin  VTE Mechanical Prophylaxis: sequential compression device

## 2024-07-15 PROCEDURE — 97530 THERAPEUTIC ACTIVITIES: CPT

## 2024-07-15 PROCEDURE — 99232 SBSQ HOSP IP/OBS MODERATE 35: CPT | Performed by: SPECIALIST

## 2024-07-15 RX ADMIN — CARBIDOPA AND LEVODOPA 1 TABLET: 25; 100 TABLET ORAL at 21:05

## 2024-07-15 RX ADMIN — HEPARIN SODIUM 5000 UNITS: 5000 INJECTION INTRAVENOUS; SUBCUTANEOUS at 05:47

## 2024-07-15 RX ADMIN — PRAMIPEXOLE DIHYDROCHLORIDE 0.12 MG: 0.25 TABLET ORAL at 17:02

## 2024-07-15 RX ADMIN — AMOXICILLIN AND CLAVULANATE POTASSIUM 1 TABLET: 875; 125 TABLET, FILM COATED ORAL at 10:23

## 2024-07-15 RX ADMIN — CARBIDOPA AND LEVODOPA 1 TABLET: 25; 100 TABLET ORAL at 10:23

## 2024-07-15 RX ADMIN — AMOXICILLIN AND CLAVULANATE POTASSIUM 1 TABLET: 875; 125 TABLET, FILM COATED ORAL at 21:05

## 2024-07-15 RX ADMIN — CARBIDOPA AND LEVODOPA 1 TABLET: 25; 100 TABLET ORAL at 17:02

## 2024-07-15 RX ADMIN — PRAMIPEXOLE DIHYDROCHLORIDE 0.12 MG: 0.25 TABLET ORAL at 21:06

## 2024-07-15 RX ADMIN — HEPARIN SODIUM 5000 UNITS: 5000 INJECTION INTRAVENOUS; SUBCUTANEOUS at 14:58

## 2024-07-15 RX ADMIN — HEPARIN SODIUM 5000 UNITS: 5000 INJECTION INTRAVENOUS; SUBCUTANEOUS at 21:05

## 2024-07-15 RX ADMIN — PRAMIPEXOLE DIHYDROCHLORIDE 0.12 MG: 0.25 TABLET ORAL at 10:23

## 2024-07-15 NOTE — OCCUPATIONAL THERAPY NOTE
OT TREATMENT     07/14/24 1410   Note Type   Note Type Treatment   Restrictions/Precautions   Other Precautions Bed Alarm;Chair Alarm;Fall Risk   ADL   Toileting Assistance  3  Moderate Assistance   Toileting Deficit Use of bedpan/urinal setup;Clothing management up;Clothing management down;Perineal hygiene;Steadying;Verbal cueing;Increased time to complete;Supervison/safety   Transfers   Sit to Stand 4  Minimal assistance   Additional items Assist x 1;Verbal cues;Armrests   Stand to Sit 3  Moderate assistance   Additional items Assist x 1;Increased time required;Verbal cues;Armrests   Subjective   Subjective I need to pee   Cognition   Arousal/Participation Alert;Responsive;Cooperative   Attention Attends with cues to redirect   Following Commands Follows one step commands with increased time or repetition   Activity Tolerance   Activity Tolerance Patient tolerated treatment well   Medical Staff Made Aware yes RN Gen   Assessment   Assessment Pt seen this PM. Per son and pt request to be assisted with toileting. Encouraged pt to ambulate to bathroom but pt declined and just wanted to use urinal bedside and while in stance. Pt needed min/mod A for sit to stand transition and mod cues (verbal/tactile and visual cues) for hand placement and safe use of RW to maintain balance in stance. Mod/max A for mgt of urinal while in stance.   The patient's raw score on the -PAC Daily Activity Inpatient Short Form is 14. A raw score of less than 19 suggests the patient may benefit from discharge to post-acute rehabilitation services. Please refer to the recommendation of the Occupational Therapist for safe discharge planning.   Plan   Treatment Interventions ADL retraining;Compensatory technique education;Activityengagement   Discharge Recommendation   Rehab Resource Intensity Level, OT II (Moderate Resource Intensity)   -PAC Daily Activity Inpatient   Lower Body Dressing 2   Bathing 2   Toileting 2   Upper Body Dressing 2    Grooming 3   Eating 3   Daily Activity Raw Score 14   Daily Activity Standardized Score (Calc for Raw Score >=11) 33.39   AM-PAC Applied Cognition Inpatient   Following a Speech/Presentation 3   Understanding Ordinary Conversation 3   Taking Medications 2   Remembering Where Things Are Placed or Put Away 3   Remembering List of 4-5 Errands 3   Taking Care of Complicated Tasks 2   Applied Cognition Raw Score 16   Applied Cognition Standardized Score 35.03   End of Consult   Education Provided Yes   Patient Position at End of Consult Bedside chair;Bed/Chair alarm activated;All needs within reach  (son present)   Nurse Communication Nurse aware of consult   Licensure   [place] License Number  Mary Rose E Blanes, MS OTR/L [id number]

## 2024-07-15 NOTE — CASE MANAGEMENT
[facility] received request for authorization from Care Manager.  Authorization request submitted for: SNF  Facility Name: [facility] NPI: [id number]  Facility MD: Claudia Matias NPI: [id number]  Authorization initiated by contacting insurance: [employer]   Via: [facility] Portal   Clinicals submitted via [facility] attachment   Pending Reference #: [id number]    Care Manager notified: Sandie Chong     Updates to authorization status will be noted in chart. Please reach out to CM for updates on any clinical information.

## 2024-07-15 NOTE — OCCUPATIONAL THERAPY NOTE
Occupational Therapy Cancel Note     Patient Name: Edwin Elkins  Today's Date: 7/15/2024  Problem List  Principal Problem:    Acute cholecystitis due to biliary calculus  Active Problems:    Venous insufficiency of both lower extremities    Perforated gallbladder    Bile peritonitis (HCC)    Extrahepatic biloma    Parkinson's disease with dyskinesia    Sepsis (HCC)    Moderate protein-calorie malnutrition (HCC)       07/15/24 1229   Note Type   Note Type Cancelled Session  (Monday 7/15/24)   Cancel Reasons Other  (pt declined participation despite max encouragement. Will continue to follow)   Licensure   [place] License Number  Archana Rees, OTR/L [id number]     Archana Rees, OTR/L  [id number]  [id number]

## 2024-07-15 NOTE — CASE MANAGEMENT
Case Management Discharge Planning Note    Patient name Edwin Elkins  Location [address] [place] [address]/[address] [place] [address] MRN [id number]  : 1932 Date 7/15/2024       Current Admission Date: 2024  Current Admission Diagnosis:Acute cholecystitis due to biliary calculus   Patient Active Problem List    Diagnosis Date Noted Date Diagnosed    Perforated gallbladder 2024     Acute cholecystitis due to biliary calculus 2024     Bile peritonitis (HCC) 2024     Extrahepatic biloma 2024     Parkinson's disease with dyskinesia 2024     Sepsis (HCC) 2024     Moderate protein-calorie malnutrition (HCC) 2024     Venous insufficiency of both lower extremities 2022     Chronic deep vein thrombosis, RIGHT LE (DVT) (HCC) 2022       LOS (days): 4  Geometric Mean LOS (GMLOS) (days): 5.1  Days to GMLOS:1.2     OBJECTIVE:  Risk of Unplanned Readmission Score: 9.43         Current admission status: Inpatient   Preferred Pharmacy:   [facility] [address] [facility] [place] [address]  Phone: [phone] Fax: [phone]    Primary Care Provider: Nithin Greenberg MD    Primary Insurance: [employer] REP  Secondary Insurance:     DISCHARGE DETAILS:    Discharge planning discussed with:: Wife Rachele, son Gilson  Freedom of Choice: Yes  Comments - Freedom of Choice: SW reviewed recommendation for STR. Wife and son both in agreement. Son confirmed accepting list can be emailed to him at [email or web address] to review. List emailed as requested.  CM contacted family/caregiver?: Yes     Contacts  Patient Contacts: Rachele Elkins (spouse)/ Gilson Elkins (son)  Relationship to Patient:: Family  Contact Method: Phone  Phone Number: [phone]  Reason/Outcome: Emergency Contact, Continuity of Care, Discharge Planning    Treatment Team Recommendation: Short Term Rehab  Discharge Destination Plan:: Short Term Rehab

## 2024-07-15 NOTE — PLAN OF CARE
Problem: Potential for Falls  Goal: Patient will remain free of falls  Description: INTERVENTIONS:  - Educate patient/family on patient safety including physical limitations  - Instruct patient to call for assistance with activity   - Consult OT/PT to assist with strengthening/mobility   - Keep Call bell within reach  - Keep bed low and locked with side rails adjusted as appropriate  - Keep care items and personal belongings within reach  - Initiate and maintain comfort rounds  - Make Fall Risk Sign visible to staff  - Offer Toileting every 2 Hours, in advance of need  - Initiate/Maintain bed alarm  - Obtain necessary fall risk management equipment:yellow socks.  Problem: PAIN - ADULT  Goal: Verbalizes/displays adequate comfort level or baseline comfort level  Description: Interventions:  - Encourage patient to monitor pain and request assistance  - Assess pain using appropriate pain scale  - Administer analgesics based on type and severity of pain and evaluate response  - Implement non-pharmacological measures as appropriate and evaluate response  - Consider cultural and social influences on pain and pain management  - Notify physician/advanced practitioner if interventions unsuccessful or patient reports new pain  Outcome: Progressing     Problem: INFECTION - ADULT  Goal: Absence or prevention of progression during hospitalization  Description: INTERVENTIONS:  - Assess and monitor for signs and symptoms of infection  - Monitor lab/diagnostic results  - Monitor all insertion sites, i.e. indwelling lines, tubes, and drains  - Monitor endotracheal if appropriate and nasal secretions for changes in amount and color  - [place] appropriate cooling/warming therapies per order  - Administer medications as ordered  - Instruct and encourage patient and family to use good hand hygiene technique  - Identify and instruct in appropriate isolation precautions for identified infection/condition  Outcome: Progressing      Problem: DISCHARGE PLANNING  Goal: Discharge to home or other facility with appropriate resources  Description: INTERVENTIONS:  - Identify barriers to discharge w/patient and caregiver  - Arrange for needed discharge resources and transportation as appropriate  - Identify discharge learning needs (meds, wound care, etc.)  - Arrange for interpretive services to assist at discharge as needed  - Refer to Case Management Department for coordinating discharge planning if the patient needs post-hospital services based on physician/advanced practitioner order or complex needs related to functional status, cognitive ability, or social support system  Outcome: Progressing     - Apply yellow socks and bracelet for high fall risk patients  - Consider moving patient to room near nurses station  Outcome: Progressing     Problem: Prexisting or High Potential for Compromised Skin Integrity  Goal: Skin integrity is maintained or improved  Description: INTERVENTIONS:  - Identify patients at risk for skin breakdown  - Assess and monitor skin integrity  - Assess and monitor nutrition and hydration status  - Monitor labs   - Assess for incontinence   - Turn and reposition patient  - Assist with mobility/ambulation  - Relieve pressure over bony prominences  - Avoid friction and shearing  - Provide appropriate hygiene as needed including keeping skin clean and dry  - Evaluate need for skin moisturizer/barrier cream  - Collaborate with interdisciplinary team   - Patient/family teaching  - Consider wound care consult   Outcome: Progressing

## 2024-07-15 NOTE — PROGRESS NOTES
"Progress Note - General Surgery   Edwin Elkins 89 y.o. male MRN: [id number]  Unit/Bed#: [address] [place] [address] Encounter: [id number]    Assessment:  89yo male with PMH of Parkinson's and glaucoma who presented with sepsis as evidenced by tachypnea and leukocytosis with bandemia secondary to calculous gallbladder perforation s/p placement of cholecystostomy tube and percutaneous drain into perihepatic space by IR on 7/11. Initially started on IV zosyn, now transitioned to oral abx      Cholecystostomy tube: 130mL (light yellow)  Abscess drain: 45mL  Body fluid culture growing E. Coli      Plan:  Continue PO Augmentin (antibiotics day 4 of 14)  Continue oral analgesia PRN.   PT/OT  Hopeful for discharge today  Will speak with Case Management      Subjective/Objective     Subjective: Denies any overt pain. Is passing gas but has not had BM. Has been walking \"a little bit\". No incentive spirometer in room. States he has been eating, when he has help. Denies nausea, vomiting, or fevers. Drains/dressings in place.       Objective:     Blood pressure 131/71, pulse 69, temperature 98 °F (36.7 °C), resp. rate 18, height 5' 10\" (1.778 m), weight 58.1 kg (128 lb), SpO2 93%.,Body mass index is 18.37 kg/m².      Intake/Output Summary (Last 24 hours) at 7/15/2024 0704  Last data filed at 7/14/2024 1918  Gross per 24 hour   Intake 10 ml   Output 45 ml   Net -35 ml       Invasive Devices       Peripheral Intravenous Line  Duration             Peripheral IV 07/12/24 Left;Ventral (anterior) Forearm 2 days              Drain  Duration             Abscess Drain Abdomen 3 days    Cholecystostomy Tube 3 days                    Physical Exam: /71   Pulse 69   Temp 98 °F (36.7 °C)   Resp 18   Ht 5' 10\" (1.778 m)   Wt 58.1 kg (128 lb)   SpO2 93%   BMI 18.37 kg/m²   General appearance: cachetic elderly male, alert and oriented, in no acute distress  Lungs: clear to auscultation bilaterally  Heart: regular rate and rhythm, S1, S2 " normal, no murmur, click, rub or gallop  Abdomen: soft, non-tender; bowel sounds normal; no masses,  no organomegaly and cholecystectomy tube and drain in place. No surrounding erythema, warmth or overt drainage from the area.   Extremities: extremities normal, warm and well-perfused; no cyanosis, clubbing, or edema  Skin: Skin color, texture, turgor normal. No rashes or lesions    Lab, Imaging and other studies:I have personally reviewed pertinent lab results.    VTE Pharmacologic Prophylaxis: Heparin SQ  VTE Mechanical Prophylaxis: sequential compression device

## 2024-07-15 NOTE — PLAN OF CARE
Problem: Potential for Falls  Goal: Patient will remain free of falls  Description: INTERVENTIONS:  - Educate patient/family on patient safety including physical limitations  - Instruct patient to call for assistance with activity   - Consult OT/PT to assist with strengthening/mobility   - Keep Call bell within reach  - Keep bed low and locked with side rails adjusted as appropriate  - Keep care items and personal belongings within reach  - Initiate and maintain comfort rounds  - Make Fall Risk Sign visible to staff  - Offer Toileting every 2  Problem: Prexisting or High Potential for Compromised Skin Integrity  Goal: Skin integrity is maintained or improved  Description: INTERVENTIONS:  - Identify patients at risk for skin breakdown  - Assess and monitor skin integrity  - Assess and monitor nutrition and hydration status  - Monitor labs   - Assess for incontinence   - Turn and reposition patient  - Assist with mobility/ambulation  - Relieve pressure over bony prominences  - Avoid friction and shearing  - Provide appropriate hygiene as needed including keeping skin clean and dry  - Evaluate need for skin moisturizer/barrier cream  - Collaborate with interdisciplinary team   - Patient/family teaching  - Consider wound care consult   Outcome: Progressing     Problem: PAIN - ADULT  Goal: Verbalizes/displays adequate comfort level or baseline comfort level  Description: Interventions:  - Encourage patient to monitor pain and request assistance  - Assess pain using appropriate pain scale  - Administer analgesics based on type and severity of pain and evaluate response  - Implement non-pharmacological measures as appropriate and evaluate response  - Consider cultural and social influences on pain and pain management  - Notify physician/advanced practitioner if interventions unsuccessful or patient reports new pain  Outcome: Progressing     Problem: INFECTION - ADULT  Goal: Absence or prevention of progression during  hospitalization  Description: INTERVENTIONS:  - Assess and monitor for signs and symptoms of infection  - Monitor lab/diagnostic results  - Monitor all insertion sites, i.e. indwelling lines, tubes, and drains  - Monitor endotracheal if appropriate and nasal secretions for changes in amount and color  - [place] appropriate cooling/warming therapies per order  - Administer medications as ordered  - Instruct and encourage patient and family to use good hand hygiene technique  - Identify and instruct in appropriate isolation precautions for identified infection/condition  Outcome: Progressing     Problem: DISCHARGE PLANNING  Goal: Discharge to home or other facility with appropriate resources  Description: INTERVENTIONS:  - Identify barriers to discharge w/patient and caregiver  - Arrange for needed discharge resources and transportation as appropriate  - Identify discharge learning needs (meds, wound care, etc.)  - Arrange for interpretive services to assist at discharge as needed  - Refer to Case Management Department for coordinating discharge planning if the patient needs post-hospital services based on physician/advanced practitioner order or complex needs related to functional status, cognitive ability, or social support system  Outcome: Progressing    Hours, in advance of need  - Initiate/Maintain bed/chair alarm  - Apply yellow socks and bracelet for high fall risk patients  - Consider moving patient to room near nurses station  Outcome: Progressing

## 2024-07-16 VITALS
TEMPERATURE: 97.2 F | RESPIRATION RATE: 18 BRPM | SYSTOLIC BLOOD PRESSURE: 124 MMHG | HEIGHT: 70 IN | WEIGHT: 128 LBS | HEART RATE: 74 BPM | BODY MASS INDEX: 18.32 KG/M2 | OXYGEN SATURATION: 90 % | DIASTOLIC BLOOD PRESSURE: 64 MMHG

## 2024-07-16 PROCEDURE — 99238 HOSP IP/OBS DSCHRG MGMT 30/<: CPT | Performed by: SPECIALIST

## 2024-07-16 PROCEDURE — NC001 PR NO CHARGE: Performed by: SPECIALIST

## 2024-07-16 PROCEDURE — 97530 THERAPEUTIC ACTIVITIES: CPT

## 2024-07-16 RX ORDER — AMOXICILLIN AND CLAVULANATE POTASSIUM 875; 125 MG/1; MG/1
1 TABLET, FILM COATED ORAL EVERY 12 HOURS SCHEDULED
Qty: 17 TABLET | Refills: 0
Start: 2024-07-16 | End: 2024-07-25

## 2024-07-16 RX ADMIN — CARBIDOPA AND LEVODOPA 1 TABLET: 25; 100 TABLET ORAL at 08:43

## 2024-07-16 RX ADMIN — HEPARIN SODIUM 5000 UNITS: 5000 INJECTION INTRAVENOUS; SUBCUTANEOUS at 05:38

## 2024-07-16 RX ADMIN — PRAMIPEXOLE DIHYDROCHLORIDE 0.12 MG: 0.25 TABLET ORAL at 08:43

## 2024-07-16 RX ADMIN — AMOXICILLIN AND CLAVULANATE POTASSIUM 1 TABLET: 875; 125 TABLET, FILM COATED ORAL at 08:43

## 2024-07-16 NOTE — CASE MANAGEMENT
[facility] has received APPROVED authorization.  Insurance: [employer]    Called in by Rep: Delores P#: [phone]  Authorization received for: SNF  Facility: [facility]   Authorization #: [id number]   Start of Care: 07/16  Next Review Date: 07/27  Continued Stay Care Coordinator: Not Provided   Submit next review to #: [phone]    Care Manager notified: Sandie Chong    Please reach out to CM for updates on any clinical information.

## 2024-07-16 NOTE — CASE MANAGEMENT
Case Management Discharge Planning Note    Patient name Edwin Elkins  Location [address] [address]/[address] [address] MRN [id number]  : 1932 Date 2024       Current Admission Date: 2024  Current Admission Diagnosis:Acute cholecystitis due to biliary calculus   Patient Active Problem List    Diagnosis Date Noted Date Diagnosed    Perforated gallbladder 2024     Acute cholecystitis due to biliary calculus 2024     Bile peritonitis (HCC) 2024     Extrahepatic biloma 2024     Parkinson's disease with dyskinesia 2024     Sepsis (HCC) 2024     Moderate protein-calorie malnutrition (HCC) 2024     Venous insufficiency of both lower extremities 2022     Chronic deep vein thrombosis, RIGHT LE (DVT) (HCC) 2022       LOS (days): 5  Geometric Mean LOS (GMLOS) (days): 5.1  Days to GMLOS:0.1     OBJECTIVE:  Risk of Unplanned Readmission Score: 9.92         Current admission status: Inpatient   Preferred Pharmacy:   [facility]  [address] [address]  Phone: [phone] Fax: [phone]    Primary Care Provider: Nithin Greenberg MD    Primary Insurance: AYO ALONSO  Secondary Insurance:     DISCHARGE DETAILS:                                                                                                               Facility Insurance Auth Number: [id number]

## 2024-07-16 NOTE — PLAN OF CARE
Problem: Potential for Falls  Goal: Patient will remain free of falls  Description: INTERVENTIONS:  - Educate patient/family on patient safety including physical limitations  - Instruct patient to call for assistance with activity   - Consult OT/PT to assist with strengthening/mobility   - Keep Call bell within reach  - Keep bed low and locked with side rails adjusted as appropriate  - Keep care items and personal belongings within reach  - Initiate and maintain comfort rounds  - Make Fall Risk Sign visible to staff  - Apply yellow socks and bracelet for high fall risk patients  - Consider moving patient to room near nurses station  Outcome: Progressing     Problem: Prexisting or High Potential for Compromised Skin Integrity  Goal: Skin integrity is maintained or improved  Description: INTERVENTIONS:  - Identify patients at risk for skin breakdown  - Assess and monitor skin integrity  - Assess and monitor nutrition and hydration status  - Monitor labs   - Assess for incontinence   - Turn and reposition patient  - Assist with mobility/ambulation  - Relieve pressure over bony prominences  - Avoid friction and shearing  - Provide appropriate hygiene as needed including keeping skin clean and dry  - Evaluate need for skin moisturizer/barrier cream  - Collaborate with interdisciplinary team   - Patient/family teaching  - Consider wound care consult   Outcome: Progressing     Problem: PAIN - ADULT  Goal: Verbalizes/displays adequate comfort level or baseline comfort level  Description: Interventions:  - Encourage patient to monitor pain and request assistance  - Assess pain using appropriate pain scale  - Administer analgesics based on type and severity of pain and evaluate response  - Implement non-pharmacological measures as appropriate and evaluate response  - Consider cultural and social influences on pain and pain management  - Notify physician/advanced practitioner if interventions unsuccessful or patient reports  new pain  Outcome: Progressing     Problem: INFECTION - ADULT  Goal: Absence or prevention of progression during hospitalization  Description: INTERVENTIONS:  - Assess and monitor for signs and symptoms of infection  - Monitor lab/diagnostic results  - Monitor all insertion sites, i.e. indwelling lines, tubes, and drains  - Monitor endotracheal if appropriate and nasal secretions for changes in amount and color  - [place] appropriate cooling/warming therapies per order  - Administer medications as ordered  - Instruct and encourage patient and family to use good hand hygiene technique  - Identify and instruct in appropriate isolation precautions for identified infection/condition  Outcome: Progressing     Problem: DISCHARGE PLANNING  Goal: Discharge to home or other facility with appropriate resources  Description: INTERVENTIONS:  - Identify barriers to discharge w/patient and caregiver  - Arrange for needed discharge resources and transportation as appropriate  - Identify discharge learning needs (meds, wound care, etc.)  - Arrange for interpretive services to assist at discharge as needed  - Refer to Case Management Department for coordinating discharge planning if the patient needs post-hospital services based on physician/advanced practitioner order or complex needs related to functional status, cognitive ability, or social support system  Outcome: Progressing

## 2024-07-16 NOTE — CASE MANAGEMENT
Per Availity, SNF auth still pending. Pending Reference #: [id number].     Escalation email sent to [facility] Escalation Team requesting expedite of determination.     CM notified: Sandie Chong

## 2024-07-16 NOTE — PHYSICAL THERAPY NOTE
"   PT TREATMENT     07/16/24 0940   PT Last Visit   PT Visit Date 07/16/24   Note Type   Note Type Treatment   Pain Assessment   Pain Assessment Tool 0-10   Pain Score No Pain   Restrictions/Precautions   Other Precautions Fall Risk;Bed Alarm;Chair Alarm   General   Chart Reviewed Yes   Cognition   Arousal/Participation Cooperative   Attention Attends with cues to redirect   Following Commands Follows one step commands with increased time or repetition   Subjective   Subjective \"I can walk\"   Transfers   Sit to Stand 4  Minimal assistance   Additional items Verbal cues;Increased time required   Stand to Sit 4  Minimal assistance   Additional items Verbal cues   Additional Comments Pt stood at the toilet to urinate with min assist with UE support on a walker.   Ambulation/Elevation   Gait pattern   (flexed posture, narrow KYAW, shuffling gait)   Gait Assistance 4  Minimal assist   Assistive Device Rolling walker   Distance 30 feet, 40 feet, 50 feet all with seated rest breaks   Balance   Static Sitting Fair   Static Standing Fair   Ambulatory Fair -   Activity Tolerance   Activity Tolerance Patient tolerated treatment well;Patient limited by fatigue   Assessment   Prognosis Good   Problem List Decreased strength;Decreased range of motion;Impaired balance;Decreased mobility;Decreased endurance   Assessment Pt demonstrates improving activity tolerance overall as pt was able to take 3 short walks today.  Pt needs min assist for transfers and to walk with a walker as well as cues for safety.  Pt should continue to benefit from skilled PT with as pt was independently ambulatory with a walker PTA.    The patient's AM-PAC Basic Mobility Inpatient Short Form Raw Score is 15. A Raw score of greater than 16 suggests the patient may benefit from discharge to home. Please also refer to the recommendation of the Physical Therapist for safe discharge planning.         Plan   Treatment/Interventions Functional transfer training;LE " strengthening/ROM;Elevations;Therapeutic exercise;Gait training;Bed mobility;Equipment eval/education;Patient/family training   Progress Progressing toward goals   PT Frequency Other (Comment)  (5x/week)   Discharge Recommendation   Rehab Resource Intensity Level, PT II (Moderate Resource Intensity)   AM-PAC Basic Mobility Inpatient   Turning in Flat Bed Without Bedrails 2   Lying on Back to Sitting on Edge of Flat Bed Without Bedrails 2   Moving Bed to Chair 3   Standing Up From Chair Using Arms 3   Walk in Room 3   Climb 3-5 Stairs With Railing 2   Basic Mobility Inpatient Raw Score 15   Basic Mobility Standardized Score 36.97   [facility] Highest Level Of Mobility   -[facility] Goal 4: Move to chair/commode   -HLM Achieved 7: Walk 25 feet or more   Licensure   [place] License Number  Shital Butler PT  [id number]

## 2024-07-16 NOTE — NURSING NOTE
Discharge via W/C to [facility]. Paper work given to ashlee. Report given to Ivana.

## 2024-07-16 NOTE — PROGRESS NOTES
"Progress Note - General Surgery   Edwin Elkins 89 y.o. male MRN: [id number]  Unit/Bed#: [address] [place] [address] Encounter: [id number]    Assessment:  89 year old male with pmhx of parkinson's and glaucoma who presented with sepsis as evidence by tachypnea and leukocytosis with bandemia secondary to calculous gallbladder perforation s/p placement of cholecystostomy tube and percutaneous drain into perihepatic space by IR on 7/11. Initially started on IV Zosyn, now on oral abx.     Cholecystostomy tube: 100 mL (light yellow)    Perihepatic drain: 60 mL   Body fluid culture growing e coli        Plan:  Continue PO Augmentin (abx day 5 of 14)  Continue oral analgesia PRN  PT/OT  Awaiting authorization for STR per CM  Plan to discharge today      Subjective/Objective   Subjective: Patient states he is doing okay. Denies pain, but endorses mild right sided pain around drain sites. He also is having paresthesias in bilateral soles of feet and he is concerned about continued incontinence in bed. He believes this is new since being in the hospital and describes that he is able to feel the urge to urinate, but cannot hold it. He is passing gas but no BM since admission. He does ambulate with assistance. He tolerates oral intake. Denies dysuria, nausea, vomiting, fevers, chills, headache, visual disturbances, back pain.     Objective:     Blood pressure 124/64, pulse 74, temperature (!) 97.2 °F (36.2 °C), resp. rate 18, height 5' 10\" (1.778 m), weight 58.1 kg (128 lb), SpO2 90%.,Body mass index is 18.37 kg/m².      Intake/Output Summary (Last 24 hours) at 7/16/2024 0902  Last data filed at 7/16/2024 0601  Gross per 24 hour   Intake 10 ml   Output 160 ml   Net -150 ml       Invasive Devices       Peripheral Intravenous Line  Duration             Peripheral IV 07/12/24 Left;Ventral (anterior) Forearm 3 days              Drain  Duration             Abscess Drain Abdomen 4 days    Cholecystostomy Tube 4 days                    Physical " Exam:     Gen: WD, cachectic, in NAD, lying in bed at baseline   HEENT: NCAT, PERRL, EOM intact   Cardio: RRR no murmurs  Abd: BS X 4, NTTP, no rigidity or guarding. Dressings in place.   Skin: warm and dry  PV: 2+ peripheral pulses, no extremity edema.   Neuro: diminished sensation to light touch in lower legs and feet. 5/5 strength. Alert and oriented x 4.     Lab, Imaging and other studies:I have personally reviewed pertinent lab results.   VTE Pharmacologic Prophylaxis: Sequential compression device (Venodyne)   VTE Mechanical Prophylaxis: sequential compression device

## 2024-07-16 NOTE — PLAN OF CARE
Problem: Potential for Falls  Goal: Patient will remain free of falls  Description: INTERVENTIONS:  - Educate patient/family on patient safety including physical limitations  - Instruct patient to call for assistance with activity   - Consult OT/PT to assist with strengthening/mobility   - Keep Call bell within reach  - Keep bed low and locked with side rails adjusted as appropriate  - Keep care items and personal belongings within reach  - Initiate and maintain comfort rounds  - Make Fall Risk Sign visible to staff  - Offer Toileting every 2 Hours, in advance of need  - Initiate/Maintain bed alarm  - Obtain necessary fall risk management equipment: waker.  - Apply yellow socks and bracelet for high fall risk patients  - Consider moving patient to room near nurses station  Outcome: Progressing

## 2024-07-16 NOTE — DISCHARGE SUMMARY
Discharge Summary - Edwin Elkins 89 y.o. male MRN: [id number]    Unit/Bed#: [address] [place] [address] Encounter: [id number]    Admission Date: 7/11/2024   Discharge Date: 7/16/2024    Admitting Diagnosis:   Abdominal pain [R10.9]  Bile peritonitis (HCC) [K65.3]  Cholecystitis with perforation of gallbladder [K82.A2]    Discharge Diagnoses: Principal Problem:    Acute cholecystitis due to biliary calculus  Active Problems:    Venous insufficiency of both lower extremities    Perforated gallbladder    Extrahepatic biloma    Parkinson's disease with dyskinesia    Moderate protein-calorie malnutrition (HCC)      Consultations: Interventional radiology    Procedures Performed:  IR placed percutaneous drain in the perihepatic space and a cholecystostomy tube    HPI per admission H&P:  Edwin Elkins is a 89 y.o. male with a pmhx of Parkinson's disease and glaucoma who presents with 3 weeks of decrease appetite, difficulty tolerating oral intake, and abdominal pain. His wife states she was concerned about his lack of appetite so she gave him probiotics which did not resolve his symptoms. Imaging studies revealed a perforated acute calculus cholecystitis with perihepatic fluid collection. The patient's wife was hesitant to make a decision regarding surgery without being accompanied by her son who was on his way.       Hospital Course: Edwin Elkins is a 89 y.o. male admitted for sepsis secondary to perforated gallbladder.  Patient was started on Zosyn and taken to interventional radiology for cholecystostomy tube and percutaneous drain placement.  The drains continued to have appropriate output during patient's stay.   Patient's leukocytosis resolved, LFTs remained normal and T bili remained around 1.0. PT and OT were consulted for patient evaluation and STR was recommended. Patient instructed to complete course of oral augmentin for total 14 day course.          Condition at Discharge: fair     Discharge instructions/Information to  patient and family:   See after visit summary for information provided to patient and family.      Provisions for Follow-Up Care:  See after visit summary for information related to follow-up care and any pertinent home health orders.      Disposition: Short-term rehab at [facility]    Planned Readmission: No    Discharge Statement   I spent 20 minutes discharging the patient. This time was spent on the day of discharge. I had direct contact with the patient on the day of discharge. Additional documentation is required if more than 30 minutes were spent on discharge.     Discharge Medications:  See after visit summary for reconciled discharge medications provided to patient and family.

## 2024-07-16 NOTE — CASE MANAGEMENT
Case Management Discharge Planning Note    Patient name Edwin Elkins  Location [address] [address]/[address] [address]-* MRN [id number]  : 1932 Date 2024       Current Admission Date: 2024  Current Admission Diagnosis:Acute cholecystitis due to biliary calculus   Patient Active Problem List    Diagnosis Date Noted Date Diagnosed    Perforated gallbladder 2024     Acute cholecystitis due to biliary calculus 2024     Bile peritonitis (HCC) 2024     Extrahepatic biloma 2024     Parkinson's disease with dyskinesia 2024     Sepsis (HCC) 2024     Moderate protein-calorie malnutrition (HCC) 2024     Venous insufficiency of both lower extremities 2022     Chronic deep vein thrombosis, RIGHT LE (DVT) (HCC) 2022       LOS (days): 5  Geometric Mean LOS (GMLOS) (days): 5.1  Days to GMLOS:0.1     OBJECTIVE:  Risk of Unplanned Readmission Score: 9.92      Current admission status: Inpatient   Preferred Pharmacy:   [facility] [address] [place] [address]  Phone: [phone] Fax: [phone]    Primary Care Provider: Nithin Greenberg MD    Primary Insurance: [employer] REP  Secondary Insurance:     DISCHARGE DETAILS:    Discharge planning discussed with:: Patient, Wife Rachele, Son Gilson BARKER contacted family/caregiver?: Yes (Family notified of auth approval and planned discharge to [facility] today pending pickup time.)    Contacts  Patient Contacts: Rachele Elkins (spouse)/ Gilson Elkins (son)  Relationship to Patient:: Family  Contact Method: In Person  Reason/Outcome: Emergency Contact, Continuity of Care, Discharge Planning    Other Referral/Resources/Interventions Provided:  Interventions: Short Term Rehab  Referral Comments: STR auth info provided to [facility] via [employer]. Facility aware of planned discharge today. Transport requested. Pickup time pending.     Treatment Team Recommendation: Short  Term Rehab  Discharge Destination Plan:: Short Term Rehab  Transport at Discharge : Wheelchair van     Number/Name of Dispatcher: [facility]  Transported by (Company and Unit #): [employer]  ETA of Transport (Date): 07/16/24  ETA of Transport (Time):  (TBD)     IMM Given (Date):: 07/16/24  IMM Given to:: Family  IMM reviewed with patient's caregiver, patient's caregiver agrees with discharge determination.     Nursing and Surgical AP aware of above. Transport requested with pickup time pending.

## 2024-07-16 NOTE — DISCHARGE INSTR - AVS FIRST PAGE
Discharge instructions:    Please complete oral antibiotics as prescribed  Please monitor and record daily output from each drain  Try to stick to a low-fat/low-cholesterol diet

## 2024-07-16 NOTE — [place] UNIVERSAL TRANSFER FORM
"[place] UNIVERSAL TRANSFER FORM  (ALL ITEMS MUST BE COMPLETED)    1. TRANSFER FROM: [facility]      TRANSFER TO: [facility]    2. DATE OF TRANSFER: 7/16/2024                        TIME OF TRANSFER: 1400    3. PATIENT NAME: Edwin Elkins E      YOB: 1932                             GENDER: male    4. LANGUAGE:   English    5. PHYSICIAN NAME:  Fahad Ledbetter MD                   PHONE: [phone]    6. CODE STATUS: Level 1 - Full Code        Out of Hospital DNR Attached: No    7. :                                      :  Extended Emergency Contact Information  Primary Emergency Contact: Rachele Elkins  Address: [address] [place] [address]  Home Phone: [phone]  Relation: Spouse           Health Care Representative/Proxy:  No           Legal Guardian:  No             NAME OF:           HEALTH CARE REPRESENTATIVE/PROXY:                                         OR           LEGAL GUARDIAN, IF NOT :                                               PHONE:  (Day)           (Night)                        (Cell)    8. REASON FOR TRANSFER:       nclude brief medical history and recent changes in physical function or cognition.)             V/S: /64   Pulse 74   Temp (!) 97.2 °F (36.2 °C)   Resp 18   Ht 5' 10\" (1.778 m)   Wt 58.1 kg (128 lb)   SpO2 90%   BMI 18.37 kg/m²           PAIN: None    9. PRIMARY DIAGNOSIS: Acute cholecystitis due to biliary calculus      Secondary Diagnosis:         Pacemaker: No      Internal Defib: No          Mental Health Diagnosis (if Applicable):    10. RESTRAINTS: No     11. RESPIRATORY NEEDS: None    12. ISOLATION/PRECAUTION: None    13. ALLERGY: Patient has no known allergies.    14. SENSORY:       Vision Poor and Glasses, Hearing Poor, and Speech Clear    15. SKIN CONDITION: No Wounds    16. DIET: Regular    17. IV ACCESS: " None    18. PERSONAL ITEMS SENT WITH PATIENT: Dentures Upper Full    19. ATTACHED DOCUMENTS: MUST ATTACH CURRENT MEDICATION INFORMATION Face Sheet and Medication Reconciliation    20. AT RISK ALERTS:Falls        HARM TO:       21. WEIGHT BEARING STATUS:         Left Leg: Limited        Right Leg: Limited    22. MENTAL STATUS:Alert and Oriented x2    23. FUNCTION:        Walk: With Help        Transfer: With Help        Toilet: With Help        Feed: With Help    24. IMMUNIZATIONS/SCREENING:     There is no immunization history on file for this patient.    25. BOWEL: Continent    26. BLADDER: Incontinent Ocassionally    27. SENDING FACILITY CONTACT:                     Title:        Unit:         Phone:          REC'G FACILITY CONTACT (if known):        Title:        Unit:         Phone:         FORM PREFILLED BY (if applicable)       Title:       Unit:        Phone:         FORM COMPLETED BY Poncho Santacruz RN      Title: [facility]ORIA      Phone: [phone]

## 2024-07-17 PROBLEM — K65.3 BILE PERITONITIS (HCC): Status: RESOLVED | Noted: 2024-07-12 | Resolved: 2024-07-17

## 2024-07-17 PROBLEM — A41.9 SEPSIS (HCC): Status: RESOLVED | Noted: 2024-07-12 | Resolved: 2024-07-17

## 2024-07-17 NOTE — UTILIZATION REVIEW
NOTIFICATION OF ADMISSION DISCHARGE   This is a Notification of Discharge from [facility]. Please be advised that this patient has been discharge from our facility. Below you will find the admission and discharge date and time including the patient’s disposition.   UTILIZATION REVIEW CONTACT:  Keyonna Perera  Utilization   Network Utilization Review Department  Phone: [phone] x carefully listen to the prompts. All voicemails are confidential.  Email: [email or web address]     ADMISSION INFORMATION  PRESENTATION DATE: 7/11/2024  9:07 AM  OBERVATION ADMISSION DATE: N/A  INPATIENT ADMISSION DATE: 7/11/24  1:11 PM   DISCHARGE DATE: 7/16/2024  3:04 PM   DISPOSITION:Non [facility]    Network Utilization Review Department  ATTENTION: Please call with any questions or concerns to [phone] and carefully listen to the prompts so that you are directed to the right person. All voicemails are confidential.   For Discharge needs, contact Care Management DC Support Team at [phone] opt. 2  Send all requests for admission clinical reviews, approved or denied determinations and any other requests to dedicated fax number below belonging to the campus where the patient is receiving treatment. List of dedicated fax numbers for the Facilities:  FACILITY NAME UR FAX NUMBER   ADMISSION DENIALS (Administrative/Medical Necessity) [phone]   DISCHARGE SUPPORT TEAM ([facility]) [phone]   PARENT CHILD HEALTH (Maternity/NICU/Pediatrics) [phone]   [facility] [phone]   [facility] [phone]   [facility] [phone]   [facility] [phone]   [facility] [phone]   [facility] [phone]   [facility] [phone]   [facility]  [phone]   [facility] [phone]   [facility] [phone]   [facility] [phone]   [facility] [phone]

## 2024-07-18 LAB
DME PARACHUTE DELIVERY DATE ACTUAL: NORMAL
DME PARACHUTE DELIVERY DATE REQUESTED: NORMAL
DME PARACHUTE DELIVERY NOTE: NORMAL
DME PARACHUTE ITEM DESCRIPTION: NORMAL
DME PARACHUTE ORDER STATUS: NORMAL
DME PARACHUTE SUPPLIER NAME: NORMAL
DME PARACHUTE SUPPLIER PHONE: NORMAL

## 2024-07-26 ENCOUNTER — HOSPITAL ENCOUNTER (OUTPATIENT)
Dept: NON INVASIVE DIAGNOSTICS | Facility: HOSPITAL | Age: 89
Discharge: HOME | End: 2024-07-26
Attending: RADIOLOGY
Payer: COMMERCIAL

## 2024-07-26 DIAGNOSIS — K82.A2 CHOLECYSTITIS WITH PERFORATION OF GALLBLADDER: ICD-10-CM

## 2024-07-26 PROCEDURE — 76080 X-RAY EXAM OF FISTULA: CPT

## 2024-07-26 PROCEDURE — 49424 ASSESS CYST CONTRAST INJECT: CPT

## 2024-07-26 PROCEDURE — 76080 X-RAY EXAM OF FISTULA: CPT | Performed by: RADIOLOGY

## 2024-07-26 PROCEDURE — 49424 ASSESS CYST CONTRAST INJECT: CPT | Performed by: RADIOLOGY

## 2024-07-26 RX ADMIN — IOHEXOL 7 ML: 350 INJECTION, SOLUTION INTRAVENOUS at 10:07

## 2024-07-26 NOTE — DISCHARGE INSTRUCTIONS
"     TUBE CARE INSTRUCTIONS    Care after your procedure:    Resume your normal diet. Small sips of flat soda will help with nausea.    1. The properly functioning catheter should be forward flushed once (1x) daily with 10ml of normal saline using clean technique. You will be given a prescription for flushes.   To flush the tube, clean both connections with alcohol swab.Twist off the drainage bag/ bulb  tubing and twist the saline syringe into the drainage tube and flush. Remove the syringe and twist the drainage bag / bulb tubing tubing back on.    2. The drainage bag/bulb may be emptied as necessary. Keep a record of the amount of fluid you drain from your tube. This should be done with clean technique as well.     3. A fresh dressing should be applied daily over the tube insertion site.     4. As the tube is secured to the skin with only a suture,try not to pull on your tube. Tub baths are not permitted. Showers are permitted if the patient's skin entry site is prevented from getting wet. Similarly, washcloth \"baths\" are acceptable.     Contact Interventional Radiology at [phone] ([place] PATIENTS: Contact Interventional Radiology at [phone]) ([place] PATIENTS: Contact Interventional Radiology at [phone]) if:    1. Leakage or large amounts of liquid around the catheter.    2. Fever of 101 degrees lasting several hours without other obvious cause (such as sore throat, flu, etc).    3. Persistent nausea or vomiting.    4. Diminished drainage, which may be associated with pressure or pain. Or when the     drainage from your tube is less than 10mls for 48 hours.    5. Catheter pulled back or falls out.      The following pharmacies carry the flush syringes.       [facility]                     [facility] [address]                     [address] [place] [address]                         [phone]  Zeferino Stafford " [place]  Phone [phone]            Phone [phone]                                                                                                       Yifan Rosas   [facility]             [facility]                                [phone]  [address] [place] [address] SANDRA FLORES  Phone [phone]            Phone [phone]                      [facility]                                                                                                          [phone]  [facility]  [address]  Zeferino FLORES                                                                               [facility]  Phone [phone]

## 2024-07-26 NOTE — BRIEF OP NOTE (RAD/CATH)
INTERVENTIONAL RADIOLOGY PROCEDURE NOTE    Date: 7/26/2024    Procedure:   Procedure Summary       Date: 07/26/24 Room / Location: [facility] Cardiac Cath Lab    Anesthesia Start:  Anesthesia Stop:     Procedure: IR DRAINAGE TUBE CHECK/CHANGE/REPOSITION/REINSERTION/UPSIZE Diagnosis:       Cholecystitis with perforation of gallbladder      (Perihepatic biloma drain check)    Scheduled Providers:  Responsible Provider:     Anesthesia Type: Not recorded ASA Status: Not recorded            Preoperative diagnosis:   1. Cholecystitis with perforation of gallbladder         Postoperative diagnosis: Same.    Surgeon: Hong Crowell MD     Assistant: None. No qualified resident was available.    Blood loss: none    Specimens: none     Findings: Biloma drain check. Persistent cavity seen. Output not sent from nursing home. Tube check in 2 weeks. Asked that nursing home send daily drainage records for next check.    Complications: None immediate.    Anesthesia: none

## 2024-08-02 ENCOUNTER — APPOINTMENT (EMERGENCY)
Dept: RADIOLOGY | Facility: HOSPITAL | Age: 89
DRG: 689 | End: 2024-08-02
Payer: COMMERCIAL

## 2024-08-02 ENCOUNTER — HOSPITAL ENCOUNTER (INPATIENT)
Facility: HOSPITAL | Age: 89
LOS: 5 days | Discharge: NURSING, REHAB OR LONG-TERM CARE | DRG: 689 | End: 2024-08-07
Attending: EMERGENCY MEDICINE | Admitting: STUDENT IN AN ORGANIZED HEALTH CARE EDUCATION/TRAINING PROGRAM
Payer: COMMERCIAL

## 2024-08-02 DIAGNOSIS — J40 BRONCHITIS WITH WHEEZING: ICD-10-CM

## 2024-08-02 DIAGNOSIS — U07.1 COVID-19: Primary | ICD-10-CM

## 2024-08-02 DIAGNOSIS — E87.1 HYPONATREMIA: ICD-10-CM

## 2024-08-02 DIAGNOSIS — R29.6 FREQUENT FALLS: ICD-10-CM

## 2024-08-02 DIAGNOSIS — R09.02 HYPOXIA: ICD-10-CM

## 2024-08-02 DIAGNOSIS — N39.0 UTI (URINARY TRACT INFECTION): ICD-10-CM

## 2024-08-02 DIAGNOSIS — K82.2 PERFORATED GALLBLADDER: ICD-10-CM

## 2024-08-02 PROBLEM — G93.41 ACUTE METABOLIC ENCEPHALOPATHY: Status: ACTIVE | Noted: 2024-08-02

## 2024-08-02 PROBLEM — N30.00 ACUTE CYSTITIS: Status: ACTIVE | Noted: 2024-08-02

## 2024-08-02 PROBLEM — J96.01 ACUTE HYPOXIC RESPIRATORY FAILURE (HCC): Status: ACTIVE | Noted: 2024-08-02

## 2024-08-02 LAB
2HR DELTA HS TROPONIN: 0 NG/L
ALBUMIN SERPL BCG-MCNC: 2.7 G/DL (ref 3.5–5)
ALP SERPL-CCNC: 65 U/L (ref 34–104)
ALT SERPL W P-5'-P-CCNC: 12 U/L (ref 7–52)
AMMONIA PLAS-SCNC: 14 UMOL/L (ref 18–72)
AMORPH URATE CRY URNS QL MICRO: ABNORMAL
ANION GAP SERPL CALCULATED.3IONS-SCNC: 6 MMOL/L (ref 4–13)
APTT PPP: 35 SECONDS (ref 23–34)
AST SERPL W P-5'-P-CCNC: 36 U/L (ref 13–39)
ATRIAL RATE: 84 BPM
BACTERIA UR QL AUTO: ABNORMAL /HPF
BASOPHILS # BLD AUTO: 0.03 THOUSANDS/ÂΜL (ref 0–0.1)
BASOPHILS NFR BLD AUTO: 1 % (ref 0–1)
BILIRUB SERPL-MCNC: 0.68 MG/DL (ref 0.2–1)
BILIRUB UR QL STRIP: NEGATIVE
BNP SERPL-MCNC: 125 PG/ML (ref 0–100)
BUN SERPL-MCNC: 9 MG/DL (ref 5–25)
CALCIUM ALBUM COR SERPL-MCNC: 9.1 MG/DL (ref 8.3–10.1)
CALCIUM SERPL-MCNC: 8.1 MG/DL (ref 8.4–10.2)
CAOX CRY URNS QL MICRO: ABNORMAL /HPF
CARDIAC TROPONIN I PNL SERPL HS: 13 NG/L
CARDIAC TROPONIN I PNL SERPL HS: 13 NG/L
CHLORIDE SERPL-SCNC: 98 MMOL/L (ref 96–108)
CLARITY UR: CLEAR
CO2 SERPL-SCNC: 26 MMOL/L (ref 21–32)
COLOR UR: YELLOW
CREAT SERPL-MCNC: 0.52 MG/DL (ref 0.6–1.3)
EOSINOPHIL # BLD AUTO: 0.39 THOUSAND/ÂΜL (ref 0–0.61)
EOSINOPHIL NFR BLD AUTO: 6 % (ref 0–6)
ERYTHROCYTE [DISTWIDTH] IN BLOOD BY AUTOMATED COUNT: 12.9 % (ref 11.6–15.1)
GFR SERPL CREATININE-BSD FRML MDRD: 93 ML/MIN/1.73SQ M
GLUCOSE SERPL-MCNC: 78 MG/DL (ref 65–140)
GLUCOSE UR STRIP-MCNC: NEGATIVE MG/DL
HCT VFR BLD AUTO: 38.2 % (ref 36.5–49.3)
HGB BLD-MCNC: 13.1 G/DL (ref 12–17)
HGB UR QL STRIP.AUTO: NEGATIVE
IMM GRANULOCYTES # BLD AUTO: 0.04 THOUSAND/UL (ref 0–0.2)
IMM GRANULOCYTES NFR BLD AUTO: 1 % (ref 0–2)
INR PPP: 1.12 (ref 0.85–1.19)
KETONES UR STRIP-MCNC: ABNORMAL MG/DL
LACTATE SERPL-SCNC: 1.5 MMOL/L (ref 0.5–2)
LEUKOCYTE ESTERASE UR QL STRIP: ABNORMAL
LYMPHOCYTES # BLD AUTO: 0.66 THOUSANDS/ÂΜL (ref 0.6–4.47)
LYMPHOCYTES NFR BLD AUTO: 11 % (ref 14–44)
MCH RBC QN AUTO: 32.7 PG (ref 26.8–34.3)
MCHC RBC AUTO-ENTMCNC: 34.3 G/DL (ref 31.4–37.4)
MCV RBC AUTO: 95 FL (ref 82–98)
MONOCYTES # BLD AUTO: 0.8 THOUSAND/ÂΜL (ref 0.17–1.22)
MONOCYTES NFR BLD AUTO: 13 % (ref 4–12)
MUCOUS THREADS UR QL AUTO: ABNORMAL
NEUTROPHILS # BLD AUTO: 4.37 THOUSANDS/ÂΜL (ref 1.85–7.62)
NEUTS SEG NFR BLD AUTO: 68 % (ref 43–75)
NITRITE UR QL STRIP: NEGATIVE
NON-SQ EPI CELLS URNS QL MICRO: ABNORMAL /HPF
NRBC BLD AUTO-RTO: 0 /100 WBCS
OSMOLALITY UR/SERPL-RTO: 274 MMOL/KG (ref 282–298)
OSMOLALITY UR: 599 MMOL/KG
P AXIS: 81 DEGREES
PH UR STRIP.AUTO: 7 [PH]
PLATELET # BLD AUTO: 248 THOUSANDS/UL (ref 149–390)
PLATELET # BLD AUTO: 260 THOUSANDS/UL (ref 149–390)
PMV BLD AUTO: 9.1 FL (ref 8.9–12.7)
PMV BLD AUTO: 9.3 FL (ref 8.9–12.7)
POTASSIUM SERPL-SCNC: 4.1 MMOL/L (ref 3.5–5.3)
PR INTERVAL: 172 MS
PROCALCITONIN SERPL-MCNC: 0.1 NG/ML
PROT SERPL-MCNC: 5.7 G/DL (ref 6.4–8.4)
PROT UR STRIP-MCNC: ABNORMAL MG/DL
PROTHROMBIN TIME: 14.9 SECONDS (ref 12.3–15)
QRS AXIS: 73 DEGREES
QRSD INTERVAL: 76 MS
QT INTERVAL: 374 MS
QTC INTERVAL: 441 MS
RBC # BLD AUTO: 4.01 MILLION/UL (ref 3.88–5.62)
RBC #/AREA URNS AUTO: ABNORMAL /HPF
SODIUM SERPL-SCNC: 130 MMOL/L (ref 135–147)
SP GR UR STRIP.AUTO: 1.02 (ref 1–1.03)
T WAVE AXIS: 76 DEGREES
TSH SERPL DL<=0.05 MIU/L-ACNC: 2.35 UIU/ML (ref 0.45–4.5)
UROBILINOGEN UR STRIP-ACNC: 2 MG/DL
VENTRICULAR RATE: 84 BPM
VIT B12 SERPL-MCNC: 540 PG/ML (ref 180–914)
WBC # BLD AUTO: 6.29 THOUSAND/UL (ref 4.31–10.16)
WBC #/AREA URNS AUTO: ABNORMAL /HPF

## 2024-08-02 PROCEDURE — 87040 BLOOD CULTURE FOR BACTERIA: CPT | Performed by: PHYSICIAN ASSISTANT

## 2024-08-02 PROCEDURE — 82140 ASSAY OF AMMONIA: CPT | Performed by: STUDENT IN AN ORGANIZED HEALTH CARE EDUCATION/TRAINING PROGRAM

## 2024-08-02 PROCEDURE — 84443 ASSAY THYROID STIM HORMONE: CPT | Performed by: STUDENT IN AN ORGANIZED HEALTH CARE EDUCATION/TRAINING PROGRAM

## 2024-08-02 PROCEDURE — 70450 CT HEAD/BRAIN W/O DYE: CPT

## 2024-08-02 PROCEDURE — 82607 VITAMIN B-12: CPT | Performed by: STUDENT IN AN ORGANIZED HEALTH CARE EDUCATION/TRAINING PROGRAM

## 2024-08-02 PROCEDURE — 99285 EMERGENCY DEPT VISIT HI MDM: CPT | Performed by: PHYSICIAN ASSISTANT

## 2024-08-02 PROCEDURE — 83930 ASSAY OF BLOOD OSMOLALITY: CPT | Performed by: STUDENT IN AN ORGANIZED HEALTH CARE EDUCATION/TRAINING PROGRAM

## 2024-08-02 PROCEDURE — 96365 THER/PROPH/DIAG IV INF INIT: CPT

## 2024-08-02 PROCEDURE — 99223 1ST HOSP IP/OBS HIGH 75: CPT | Performed by: STUDENT IN AN ORGANIZED HEALTH CARE EDUCATION/TRAINING PROGRAM

## 2024-08-02 PROCEDURE — 94640 AIRWAY INHALATION TREATMENT: CPT

## 2024-08-02 PROCEDURE — 81001 URINALYSIS AUTO W/SCOPE: CPT | Performed by: PHYSICIAN ASSISTANT

## 2024-08-02 PROCEDURE — 93010 ELECTROCARDIOGRAM REPORT: CPT | Performed by: INTERNAL MEDICINE

## 2024-08-02 PROCEDURE — 83880 ASSAY OF NATRIURETIC PEPTIDE: CPT | Performed by: PHYSICIAN ASSISTANT

## 2024-08-02 PROCEDURE — 80053 COMPREHEN METABOLIC PANEL: CPT | Performed by: PHYSICIAN ASSISTANT

## 2024-08-02 PROCEDURE — 36415 COLL VENOUS BLD VENIPUNCTURE: CPT | Performed by: PHYSICIAN ASSISTANT

## 2024-08-02 PROCEDURE — 85730 THROMBOPLASTIN TIME PARTIAL: CPT | Performed by: PHYSICIAN ASSISTANT

## 2024-08-02 PROCEDURE — 99285 EMERGENCY DEPT VISIT HI MDM: CPT

## 2024-08-02 PROCEDURE — 84484 ASSAY OF TROPONIN QUANT: CPT | Performed by: PHYSICIAN ASSISTANT

## 2024-08-02 PROCEDURE — 94760 N-INVAS EAR/PLS OXIMETRY 1: CPT

## 2024-08-02 PROCEDURE — 85025 COMPLETE CBC W/AUTO DIFF WBC: CPT | Performed by: PHYSICIAN ASSISTANT

## 2024-08-02 PROCEDURE — 85049 AUTOMATED PLATELET COUNT: CPT | Performed by: STUDENT IN AN ORGANIZED HEALTH CARE EDUCATION/TRAINING PROGRAM

## 2024-08-02 PROCEDURE — 71045 X-RAY EXAM CHEST 1 VIEW: CPT

## 2024-08-02 PROCEDURE — 96361 HYDRATE IV INFUSION ADD-ON: CPT

## 2024-08-02 PROCEDURE — 96375 TX/PRO/DX INJ NEW DRUG ADDON: CPT

## 2024-08-02 PROCEDURE — 83605 ASSAY OF LACTIC ACID: CPT | Performed by: PHYSICIAN ASSISTANT

## 2024-08-02 PROCEDURE — 87147 CULTURE TYPE IMMUNOLOGIC: CPT | Performed by: STUDENT IN AN ORGANIZED HEALTH CARE EDUCATION/TRAINING PROGRAM

## 2024-08-02 PROCEDURE — 84145 PROCALCITONIN (PCT): CPT | Performed by: PHYSICIAN ASSISTANT

## 2024-08-02 PROCEDURE — 72125 CT NECK SPINE W/O DYE: CPT

## 2024-08-02 PROCEDURE — 87081 CULTURE SCREEN ONLY: CPT | Performed by: STUDENT IN AN ORGANIZED HEALTH CARE EDUCATION/TRAINING PROGRAM

## 2024-08-02 PROCEDURE — 83935 ASSAY OF URINE OSMOLALITY: CPT | Performed by: STUDENT IN AN ORGANIZED HEALTH CARE EDUCATION/TRAINING PROGRAM

## 2024-08-02 PROCEDURE — 85610 PROTHROMBIN TIME: CPT | Performed by: PHYSICIAN ASSISTANT

## 2024-08-02 PROCEDURE — 93005 ELECTROCARDIOGRAM TRACING: CPT

## 2024-08-02 RX ORDER — METHYLPREDNISOLONE SODIUM SUCCINATE 125 MG/2ML
60 INJECTION, POWDER, LYOPHILIZED, FOR SOLUTION INTRAMUSCULAR; INTRAVENOUS ONCE
Status: COMPLETED | OUTPATIENT
Start: 2024-08-02 | End: 2024-08-02

## 2024-08-02 RX ORDER — PRAMIPEXOLE DIHYDROCHLORIDE 0.25 MG/1
0.12 TABLET ORAL 3 TIMES DAILY
Status: DISCONTINUED | OUTPATIENT
Start: 2024-08-02 | End: 2024-08-07 | Stop reason: HOSPADM

## 2024-08-02 RX ORDER — NIRMATRELVIR AND RITONAVIR 150-100 MG
2 KIT ORAL 2 TIMES DAILY
COMMUNITY
End: 2024-08-07

## 2024-08-02 RX ORDER — CEFTRIAXONE 1 G/50ML
1000 INJECTION, SOLUTION INTRAVENOUS ONCE
Status: COMPLETED | OUTPATIENT
Start: 2024-08-02 | End: 2024-08-02

## 2024-08-02 RX ORDER — METHYLPREDNISOLONE SODIUM SUCCINATE 125 MG/2ML
60 INJECTION, POWDER, LYOPHILIZED, FOR SOLUTION INTRAMUSCULAR; INTRAVENOUS EVERY 8 HOURS SCHEDULED
Status: DISCONTINUED | OUTPATIENT
Start: 2024-08-03 | End: 2024-08-06

## 2024-08-02 RX ORDER — DORZOLAMIDE HCL 20 MG/ML
1 SOLUTION/ DROPS OPHTHALMIC 3 TIMES DAILY
Status: DISCONTINUED | OUTPATIENT
Start: 2024-08-02 | End: 2024-08-07 | Stop reason: HOSPADM

## 2024-08-02 RX ORDER — IPRATROPIUM BROMIDE AND ALBUTEROL SULFATE 2.5; .5 MG/3ML; MG/3ML
3 SOLUTION RESPIRATORY (INHALATION) ONCE
Status: COMPLETED | OUTPATIENT
Start: 2024-08-02 | End: 2024-08-02

## 2024-08-02 RX ORDER — TAMSULOSIN HYDROCHLORIDE 0.4 MG/1
0.4 CAPSULE ORAL
Status: DISCONTINUED | OUTPATIENT
Start: 2024-08-03 | End: 2024-08-07 | Stop reason: HOSPADM

## 2024-08-02 RX ORDER — IPRATROPIUM BROMIDE AND ALBUTEROL SULFATE 2.5; .5 MG/3ML; MG/3ML
3 SOLUTION RESPIRATORY (INHALATION)
Status: DISCONTINUED | OUTPATIENT
Start: 2024-08-02 | End: 2024-08-03

## 2024-08-02 RX ORDER — CEFTRIAXONE 1 G/50ML
1000 INJECTION, SOLUTION INTRAVENOUS EVERY 24 HOURS
Status: DISCONTINUED | OUTPATIENT
Start: 2024-08-03 | End: 2024-08-06

## 2024-08-02 RX ORDER — HEPARIN SODIUM 5000 [USP'U]/ML
5000 INJECTION, SOLUTION INTRAVENOUS; SUBCUTANEOUS EVERY 8 HOURS SCHEDULED
Status: DISCONTINUED | OUTPATIENT
Start: 2024-08-02 | End: 2024-08-07 | Stop reason: HOSPADM

## 2024-08-02 RX ADMIN — SODIUM CHLORIDE 1000 ML: 0.9 INJECTION, SOLUTION INTRAVENOUS at 09:14

## 2024-08-02 RX ADMIN — IPRATROPIUM BROMIDE AND ALBUTEROL SULFATE 3 ML: 2.5; .5 SOLUTION RESPIRATORY (INHALATION) at 20:04

## 2024-08-02 RX ADMIN — METHYLPREDNISOLONE SODIUM SUCCINATE 60 MG: 125 INJECTION, POWDER, FOR SOLUTION INTRAMUSCULAR; INTRAVENOUS at 11:42

## 2024-08-02 RX ADMIN — IPRATROPIUM BROMIDE AND ALBUTEROL SULFATE 3 ML: .5; 3 SOLUTION RESPIRATORY (INHALATION) at 12:39

## 2024-08-02 RX ADMIN — PRAMIPEXOLE DIHYDROCHLORIDE 0.12 MG: 0.25 TABLET ORAL at 22:44

## 2024-08-02 RX ADMIN — DORZOLAMIDE HYDROCHLORIDE 1 DROP: 20 SOLUTION OPHTHALMIC at 22:48

## 2024-08-02 RX ADMIN — CEFTRIAXONE 1000 MG: 1 INJECTION, SOLUTION INTRAVENOUS at 11:43

## 2024-08-02 RX ADMIN — HEPARIN SODIUM 5000 UNITS: 5000 INJECTION INTRAVENOUS; SUBCUTANEOUS at 22:44

## 2024-08-02 RX ADMIN — IPRATROPIUM BROMIDE AND ALBUTEROL SULFATE 3 ML: .5; 3 SOLUTION RESPIRATORY (INHALATION) at 10:48

## 2024-08-02 RX ADMIN — CARBIDOPA AND LEVODOPA 1 TABLET: 25; 100 TABLET ORAL at 22:45

## 2024-08-02 NOTE — Clinical Note
Case was discussed with OLGA and the patient's admission status was agreed to be Admission Status: ? status to the service of Dr. Conn.

## 2024-08-02 NOTE — H&P
[facility]  H&P  Name: Edwin Elkins 89 y.o. male I MRN: [id number]  Unit/Bed#: ED 03 I Date of Admission: 8/2/2024   Date of Service: 8/2/2024 I Hospital Day: 0      Assessment & Plan   Hyponatremia  Assessment & Plan  Pt received a 1 liter bolus of NS in the ED.  Will check osmolality studies and follow BMP     Acute metabolic encephalopathy  Assessment & Plan  Differential includes infection related (UTI and recent COVID) vs metabolic derangement.  CT head is negative for acute process.  Will check B12 level, Ammonia level, TSH, treat UTI and follow clinically    Acute cystitis  Assessment & Plan  Resume IV Ceftriaxone and follow urine culture     Acute hypoxic respiratory failure (HCC)  Assessment & Plan  Pt recently completed therapy for COVID infection with Paxlovid.  CXR with no obvious infiltrate noted.  Pt has wheezing on exam so possibly related to acute bronchitis post infection.  Will resume IV Solumedrol and duonebs and plan to wean from O2 as tolerated     Frequent falls  Assessment & Plan  CT head is negative for acute process. Likely related to his Parkinson's Disease.  Will check B12 level, place on fall precautions and have PT/OT evaluate for discharge needs     Parkinson's disease with dyskinesia  Assessment & Plan  Resume Sinemet and Pramipexole.  Will place on fall precautions and have PT/OT evaluate          VTE Pharmacologic Prophylaxis:    Heparin  Code Status: Prior   Discussion with family: Attempted to update  (wife) via phone. Left voicemail.     Anticipated Length of Stay: Patient will be admitted on an inpatient basis with an anticipated length of stay of greater than 2 midnights secondary to respiratory failure; encephalopathy, UTI treatment, PT evaluation .    Total Time Spent on Date of Encounter in care of patient: 55 mins. This time was spent on one or more of the following: performing physical exam; counseling and coordination of care;  "obtaining or reviewing history; documenting in the medical record; reviewing/ordering tests, medications or procedures; communicating with other healthcare professionals and discussing with patient's family/caregivers.    Chief Complaint: confusion and falls    History of Present Illness:  Edwin Elkins is a 89 y.o. male with a PMH of Parkinson's Disease who presents with confusion and falls.  The pt was recently being treated for a COVID infection with Paxlovid with a completion date of 8/1.  Yesterday the pt had a fall which he states was due to him tripping over something. Today he had another fall which he says is secondary to his \"socks slipping\".  He was also noted to be confused and had a head injury.  Due to these symptoms the pt came to the ER for evaluation.  Currently he denies any chest pain or SOB at rest.  He denies any headache.      Review of Systems:  Review of Systems   Reason unable to perform ROS: altered mental status.       Past Medical and Surgical History:   Past Medical History:   Diagnosis Date    Glaucoma     Parkinson disease        Past Surgical History:   Procedure Laterality Date    IR CHOLECYSTOSTOMY TUBE PLACEMENT  7/11/2024    IR DRAINAGE TUBE CHECK/CHANGE/REPOSITION/REINSERTION/UPSIZE  7/26/2024       Meds/Allergies:  Prior to Admission medications    Medication Sig Start Date End Date Taking? Authorizing Provider   carbidopa-levodopa (SINEMET)  mg per tablet Take 1 tablet by mouth 3 (three) times a day 5/17/21 8/2/24 Yes Historical Provider, MD   dorzolamide (TRUSOPT) 2 % ophthalmic solution Administer to both eyes 3 (three) times a day 1/25/22  Yes Historical Provider, MD   nirmatrelvir & ritonavir (Paxlovid, 150/100,) tablet therapy pack Take 2 tablets by mouth 2 (two) times a day   Yes Historical Provider, MD   pramipexole (MIRAPEX) 0.125 mg tablet TAKE ONE TABLET BY MOUTH THREE TIMES A DAY (GENERIC FOR MIRAPLEX) 11/10/21  Yes Historical Provider, MD   Sodium Chloride 0.9 " "% KIT Inject 60 mL under the skin every 1 (one) hour \"Sodium chloride solution 0.9% inject 60 mg/ml subcutaneously every hour for COVID-19 low sodium for 2 Days max rate of 60 ml/hr, max vol of 1.5 L in 24 hr if removed by resident discontinue and encourage fluids\"   Yes Historical Provider, MD   tamsulosin (FLOMAX) 0.4 mg Take 0.4 mg by mouth daily with dinner 11/17/21 8/2/24 Yes Historical Provider, MD     I have reviewed home medications with patient personally.    Allergies: No Known Allergies    Social History:  Marital Status: /Civil Union   Patient Pre-hospital Living Situation: presents from SNF    Substance Use History:   Social History     Substance and Sexual Activity   Alcohol Use Not Currently     Social History     Tobacco Use   Smoking Status Former   Smokeless Tobacco Never     Social History     Substance and Sexual Activity   Drug Use Never       Family History:  History reviewed. No pertinent family history.    Physical Exam:     Vitals:   Blood Pressure: 105/64 (08/02/24 1145)  Pulse: 82 (08/02/24 1145)  Temperature: 97.8 °F (36.6 °C) (08/02/24 1130)  Temp Source: Oral (08/02/24 1130)  Respirations: 22 (08/02/24 1145)  Weight - Scale: 58.1 kg (128 lb) (08/02/24 1130)  SpO2: 94 % (08/02/24 1145)    General: in no acute distress  HEENT: forehead wrapped in gauze due to head injury  Skin: no jaundice  CVS: RRR, no murmurs appreciated  Lungs: expiratory wheezing noted in left upper lung   Abdomen: soft, nondistended, bowel sounds normal, nontender upon palpation, no guarding or rebound tenderness  Extremities: no edema, no calf swelling or tenderness  Neuro: alert and oriented x3, tremors noted in all extremities   Psych: calm, cooperative      Additional Data:     Lab Results:  Results from last 7 days   Lab Units 08/02/24  0909   WBC Thousand/uL 6.29   HEMOGLOBIN g/dL 13.1   HEMATOCRIT % 38.2   PLATELETS Thousands/uL 260   SEGS PCT % 68   LYMPHO PCT % 11*   MONO PCT % 13*   EOS PCT % 6 "     Results from last 7 days   Lab Units 08/02/24  0909   SODIUM mmol/L 130*   POTASSIUM mmol/L 4.1   CHLORIDE mmol/L 98   CO2 mmol/L 26   BUN mg/dL 9   CREATININE mg/dL 0.52*   ANION GAP mmol/L 6   CALCIUM mg/dL 8.1*   ALBUMIN g/dL 2.7*   TOTAL BILIRUBIN mg/dL 0.68   ALK PHOS U/L 65   ALT U/L 12   AST U/L 36   GLUCOSE RANDOM mg/dL 78     Results from last 7 days   Lab Units 08/02/24  0909   INR  1.12         Lab Results   Component Value Date    HGBA1C 5.6 11/07/2022     Results from last 7 days   Lab Units 08/02/24  0909   LACTIC ACID mmol/L 1.5   PROCALCITONIN ng/ml 0.10       Lines/Drains:  Invasive Devices       Peripheral Intravenous Line  Duration             Peripheral IV 08/02/24 Right;Ventral (anterior) Forearm <1 day              Drain  Duration             Abscess Drain Abdomen 21 days    Cholecystostomy Tube 21 days                        Imaging: Reviewed radiology reports from this admission including: CT head  XR chest 1 view portable   Final Result by Chauncey Canseco MD (08/02 1058)      No acute cardiopulmonary disease.            Workstation performed: [id number]         CT head without contrast   Final Result by Antonio Juarez MD (08/02 1003)      No acute intracranial abnormality.                  Workstation performed: [id number]         CT cervical spine without contrast   Final Result by Antonoi Juarez MD (08/02 1011)      No cervical spine fracture or traumatic malalignment.                  Workstation performed: [id number]               ** Please Note: This note has been constructed using a voice recognition system. **

## 2024-08-02 NOTE — PLAN OF CARE
Problem: Potential for Falls  Goal: Patient will remain free of falls  Description: INTERVENTIONS:  - Educate patient/family on patient safety including physical limitations  - Instruct patient to call for assistance with activity   - Consult OT/PT to assist with strengthening/mobility   - Keep Call bell within reach  - Keep bed low and locked with side rails adjusted as appropriate  - Keep care items and personal belongings within reach  - Initiate and maintain comfort rounds  - Make Fall Risk Sign visible to staff  - Offer Toileting every 2 Hours, in advance of need  - Initiate/Maintain Bed/Chair alarm  - Obtain necessary fall risk management equipment: walker  - Apply yellow socks and bracelet for high fall risk patients  - Consider moving patient to room near nurses station  Outcome: Progressing     Problem: RESPIRATORY - ADULT  Goal: Achieves optimal ventilation and oxygenation  Description: INTERVENTIONS:  - Assess for changes in respiratory status  - Assess for changes in mentation and behavior  - Position to facilitate oxygenation and minimize respiratory effort  - Oxygen administered by appropriate delivery if ordered  - Initiate smoking cessation education as indicated  - Encourage broncho-pulmonary hygiene including cough, deep breathe, Incentive Spirometry  - Assess the need for suctioning and aspirate as needed  - Assess and instruct to report SOB or any respiratory difficulty  - Respiratory Therapy support as indicated  8/2/2024 1649 by Dulce Maria Medellin RN  Outcome: Progressing  8/2/2024 1646 by Dulce Maria Medellin RN  Outcome: Progressing

## 2024-08-02 NOTE — ED NOTES
Pt repositioned to eat, eating soup without any problem at this time.      Jacque Rubin RN  08/02/24 5650

## 2024-08-02 NOTE — ASSESSMENT & PLAN NOTE
Differential includes infection related (UTI and recent COVID) vs metabolic derangement.  CT head is negative for acute process.  Will check B12 level, Ammonia level, TSH, treat UTI and follow clinically

## 2024-08-02 NOTE — ED PROVIDER NOTES
"History  Chief Complaint   Patient presents with    Fall     Per S patient dx with covid about five days ago.  State patient has had frequent falls in the past 24 hours with a head strike.  Unknown time of last fall.  Patient denies any complaints at this time.  NH states patient with increased confusion     Patient is a 89-year-old male with a PMHx of cholecystitis (s/p cholecystostomy tube and percutaneous drain placement, 7/11/24), glaucoma and Parkinson's disease, presenting to the ED for evaluation after multiple falls. Patient presents from a skilled nursing facility for evaluation of multiple falls and increased confusion over the past 24 hours. Per staff, patient has had 4 unwitnessed falls in the past 24 hours and seems to be more confused than his baseline. Patient is currently awake and oriented x3 and states that he tripped yesterday which caused him to fall and this morning's fall was due to his socks \"slipping\". He admits to striking his forehead this morning on a \"bolt\" but denies any loss of consciousness and is not on any blood thinners or aspirin.  He denies any dizziness, lightheadedness or chest pain preceding the falls. He denies any headaches, neck pain or injuries and has no complaints at this time. He denies any nausea, vomiting, diarrhea, constipation, abdominal pain or urinary symptoms. Patient is currently COVID positive (day #6) and completed a 5-day course of Paxlovid yesterday (per staff, patient's roommate tested positive and this is why patient was tested). He has not had any fevers and denies any cough, congestion, chest pain or shortness of breath. He is not typically on any oxygen; however, per EMS patient was wheezing and SpO2 was in the low 90's so they placed him on 2L/min nasal canula.         Prior to Admission Medications   Prescriptions Last Dose Informant Patient Reported? Taking?   Sodium Chloride 0.9 % KIT 8/2/2024 at 0700  Yes Yes   Sig: Inject 60 mL under the skin " "every 1 (one) hour \"Sodium chloride solution 0.9% inject 60 mg/ml subcutaneously every hour for COVID-19 low sodium for 2 Days max rate of 60 ml/hr, max vol of 1.5 L in 24 hr if removed by resident discontinue and encourage fluids\"   carbidopa-levodopa (SINEMET)  mg per tablet 8/1/2024 Self Yes Yes   Sig: Take 1 tablet by mouth 3 (three) times a day   dorzolamide (TRUSOPT) 2 % ophthalmic solution 8/1/2024 Spouse/Significant Other Yes Yes   Sig: Administer to both eyes 3 (three) times a day   nirmatrelvir & ritonavir (Paxlovid, 150/100,) tablet therapy pack 8/1/2024  Yes Yes   Sig: Take 2 tablets by mouth 2 (two) times a day   pramipexole (MIRAPEX) 0.125 mg tablet 8/1/2024 Spouse/Significant Other Yes Yes   Sig: TAKE ONE TABLET BY MOUTH THREE TIMES A DAY (GENERIC FOR MIRAPLEX)   tamsulosin (FLOMAX) 0.4 mg 8/1/2024 Spouse/Significant Other Yes Yes   Sig: Take 0.4 mg by mouth daily with dinner      Facility-Administered Medications: None       Past Medical History:   Diagnosis Date    Glaucoma     Parkinson disease        Past Surgical History:   Procedure Laterality Date    IR CHOLECYSTOSTOMY TUBE PLACEMENT  7/11/2024    IR DRAINAGE TUBE CHECK/CHANGE/REPOSITION/REINSERTION/UPSIZE  7/26/2024       History reviewed. No pertinent family history.  I have reviewed and agree with the history as documented.    E-Cigarette/Vaping    E-Cigarette Use Never User      E-Cigarette/Vaping Substances    Nicotine No      Social History     Tobacco Use    Smoking status: Former    Smokeless tobacco: Never   Vaping Use    Vaping status: Never Used   Substance Use Topics    Alcohol use: Not Currently    Drug use: Never       Review of Systems   Constitutional:  Negative for chills and fever.   HENT:  Negative for congestion, ear pain and sore throat.    Eyes:  Negative for visual disturbance.   Respiratory:  Negative for cough and shortness of breath.    Cardiovascular:  Negative for chest pain and palpitations. "   Gastrointestinal:  Negative for abdominal pain, blood in stool, constipation, diarrhea, nausea and vomiting.   Genitourinary:  Positive for frequency. Negative for dysuria and hematuria.   Musculoskeletal:  Negative for back pain and neck pain.   Skin:  Positive for wound (abrasion/skin tear of forehead and right upper arm). Negative for rash.   Neurological:  Negative for dizziness, syncope and headaches.   Psychiatric/Behavioral:  Positive for confusion.    All other systems reviewed and are negative.      Physical Exam  Physical Exam  Vitals and nursing note reviewed.   Constitutional:       General: He is awake. He is not in acute distress.     Appearance: Normal appearance. He is well-developed. He is not ill-appearing or diaphoretic.      Interventions: Cervical collar in place.   HENT:      Head: Normocephalic and atraumatic.        Right Ear: External ear normal.      Left Ear: External ear normal.      Nose: Nose normal.        Mouth/Throat:      Lips: Pink.      Mouth: Mucous membranes are moist.   Eyes:      General: Lids are normal. No scleral icterus.     Conjunctiva/sclera: Conjunctivae normal.      Pupils: Pupils are equal, round, and reactive to light.   Cardiovascular:      Rate and Rhythm: Normal rate and regular rhythm.      Pulses: Normal pulses.           Radial pulses are 2+ on the right side and 2+ on the left side.      Heart sounds: Normal heart sounds, S1 normal and S2 normal.   Pulmonary:      Effort: Pulmonary effort is normal. Tachypnea present. No accessory muscle usage, respiratory distress or retractions.      Breath sounds: No stridor. Wheezing present. No decreased breath sounds, rhonchi or rales.      Comments: Patient has diffuse bilateral expiratory wheezing.  He is mildly tachypneic but is able to speak in full sentences.  No accessory muscle usage, retractions or increased work of breathing.  Abdominal:      General: Abdomen is flat. Bowel sounds are normal. There is no  distension.      Palpations: Abdomen is soft.      Tenderness: There is no abdominal tenderness. There is no right CVA tenderness, left CVA tenderness, guarding or rebound.      Comments: IR drain and cholecystostomy tube in place with bile in drain bag; sites appears clean/dry/intact   Musculoskeletal:      Cervical back: Neck supple. No signs of trauma.      Right lower leg: No edema.      Left lower leg: No edema.   Skin:     General: Skin is warm and dry.      Capillary Refill: Capillary refill takes less than 2 seconds.      Coloration: Skin is not cyanotic, jaundiced or pale.          Neurological:      Mental Status: He is alert and oriented to person, place, and time.      GCS: GCS eye subscore is 4. GCS verbal subscore is 5. GCS motor subscore is 6.      Cranial Nerves: No dysarthria or facial asymmetry.   Psychiatric:         Attention and Perception: Attention normal.         Mood and Affect: Mood normal.         Speech: Speech normal.         Behavior: Behavior normal. Behavior is cooperative.         Vital Signs  ED Triage Vitals [08/02/24 0835]   Temperature Pulse Respirations Blood Pressure SpO2   (!) 96.5 °F (35.8 °C) 78 (!) 26 131/69 94 %      Temp Source Heart Rate Source Patient Position - Orthostatic VS BP Location FiO2 (%)   Rectal Monitor Lying Left arm --      Pain Score       No Pain           Vitals:    08/02/24 1330 08/02/24 1345 08/02/24 1415 08/02/24 1455   BP: 116/58 108/66 101/57 117/63   Pulse: 88 88 83 90   Patient Position - Orthostatic VS:   Sitting          Visual Acuity      ED Medications  Medications   ipratropium-albuterol (DUO-NEB) 0.5-2.5 mg/3 mL inhalation solution 3 mL (has no administration in time range)   methylPREDNISolone sodium succinate (Solu-MEDROL) injection 60 mg (has no administration in time range)   cefTRIAXone (ROCEPHIN) IVPB (premix in dextrose) 1,000 mg 50 mL (has no administration in time range)   sodium chloride 0.9 % bolus 1,000 mL (0 mL Intravenous  "Stopped 8/2/24 1014)   ipratropium-albuterol (DUO-NEB) 0.5-2.5 mg/3 mL inhalation solution 3 mL (3 mL Nebulization Given 8/2/24 1048)   cefTRIAXone (ROCEPHIN) IVPB (premix in dextrose) 1,000 mg 50 mL (0 mg Intravenous Stopped 8/2/24 1213)   methylPREDNISolone sodium succinate (Solu-MEDROL) injection 60 mg (60 mg Intravenous Given 8/2/24 1142)   ipratropium-albuterol (DUO-NEB) 0.5-2.5 mg/3 mL inhalation solution 3 mL (3 mL Nebulization Given 8/2/24 1239)       Diagnostic Studies  Results Reviewed       Procedure Component Value Units Date/Time    TSH, 3rd generation with Free T4 reflex [[id number]]  (Normal) Collected: 08/02/24 1247    Lab Status: Final result Specimen: Blood from Arm, Left Updated: 08/02/24 1338     TSH 3RD GENERATON 2.351 uIU/mL     Ammonia [[id number]]  (Abnormal) Collected: 08/02/24 1247    Lab Status: Final result Specimen: Blood from Arm, Left Updated: 08/02/24 1313     Ammonia 14 umol/L     Osmolality, urine [[id number]] Collected: 08/02/24 1254    Lab Status: In process Specimen: Urine, Clean Catch Updated: 08/02/24 1302    Osmolality-\"If this is regarding a toxic alcohol, STOP. Test is not routinely indicated. Please consult medical  on call for further guidance.\" [[id number]] Collected: 08/02/24 1247    Lab Status: In process Specimen: Blood from Arm, Left Updated: 08/02/24 1251    Vitamin B12 [[id number]] Collected: 08/02/24 1247    Lab Status: In process Specimen: Blood from Arm, Left Updated: 08/02/24 1251    HS Troponin I 2hr [[id number]]  (Normal) Collected: 08/02/24 1143    Lab Status: Final result Specimen: Blood from Arm, Right Updated: 08/02/24 1215     hs TnI 2hr 13 ng/L      Delta 2hr hsTnI 0 ng/L     HS Troponin 0hr (reflex protocol) [[id number]]  (Normal) Collected: 08/02/24 0909    Lab Status: Final result Specimen: Blood Updated: 08/02/24 1122     hs TnI 0hr 13 ng/L     B-Type Natriuretic Peptide(BNP) [[id number]]  (Abnormal) Collected: 08/02/24 0909    Lab Status: Final " result Specimen: Blood from Arm, Right Updated: 08/02/24 1120      pg/mL     Urine Microscopic [[id number]]  (Abnormal) Collected: 08/02/24 1048    Lab Status: Final result Specimen: Urine, Clean Catch Updated: 08/02/24 1116     RBC, UA 0-1 /hpf      WBC, UA 2-4 /hpf      Epithelial Cells Occasional /hpf      Bacteria, UA Moderate /hpf      MUCUS THREADS Occasional     Amorphous Crystals, UA Occasional     Ca Oxalate Stacey, UA Occasional /hpf     UA w Reflex to Microscopic w Reflex to Culture [[id number]]  (Abnormal) Collected: 08/02/24 1048    Lab Status: Final result Specimen: Urine, Clean Catch Updated: 08/02/24 1054     Color, UA Yellow     Clarity, UA Clear     Specific Gravity, UA 1.020     pH, UA 7.0     Leukocytes, UA Moderate     Nitrite, UA Negative     Protein, UA Trace mg/dl      Glucose, UA Negative mg/dl      Ketones, UA Trace mg/dl      Urobilinogen, UA 2.0 mg/dl      Bilirubin, UA Negative     Occult Blood, UA Negative    Procalcitonin [[id number]]  (Normal) Collected: 08/02/24 0909    Lab Status: Final result Specimen: Blood from Arm, Right Updated: 08/02/24 1000     Procalcitonin 0.10 ng/ml     Protime-INR [[id number]]  (Normal) Collected: 08/02/24 0909    Lab Status: Final result Specimen: Blood from Arm, Right Updated: 08/02/24 0950     Protime 14.9 seconds      INR 1.12    Narrative:      INR Therapeutic Range    Indication                                             INR Range      Atrial Fibrillation                                               2.0-3.0  Hypercoagulable State                                    2.0.2.3  Left Ventricular Asist Device                            2.0-3.0  Mechanical Heart Valve                                  -    Aortic(with afib, MI, embolism, HF, LA enlargement,    and/or coagulopathy)                                     2.0-3.0 (2.5-3.5)     Mitral                                                             2.5-3.5  Prosthetic/Bioprosthetic Heart Valve                2.0-3.0  Venous thromboembolism (VTE: VT, PE        2.0-3.0    APTT [[id number]]  (Abnormal) Collected: 08/02/24 0909    Lab Status: Final result Specimen: Blood from Arm, Right Updated: 08/02/24 0950     PTT 35 seconds     Comprehensive metabolic panel [[id number]]  (Abnormal) Collected: 08/02/24 0909    Lab Status: Final result Specimen: Blood from Arm, Right Updated: 08/02/24 0949     Sodium 130 mmol/L      Potassium 4.1 mmol/L      Chloride 98 mmol/L      CO2 26 mmol/L      ANION GAP 6 mmol/L      BUN 9 mg/dL      Creatinine 0.52 mg/dL      Glucose 78 mg/dL      Calcium 8.1 mg/dL      Corrected Calcium 9.1 mg/dL      AST 36 U/L      ALT 12 U/L      Alkaline Phosphatase 65 U/L      Total Protein 5.7 g/dL      Albumin 2.7 g/dL      Total Bilirubin 0.68 mg/dL      eGFR 93 ml/min/1.73sq m     Narrative:      [employer] guidelines for Chronic Kidney Disease (CKD):     Stage 1 with normal or high GFR (GFR > 90 mL/min/1.73 square meters)    Stage 2 Mild CKD (GFR = 60-89 mL/min/1.73 square meters)    Stage 3A Moderate CKD (GFR = 45-59 mL/min/1.73 square meters)    Stage 3B Moderate CKD (GFR = 30-44 mL/min/1.73 square meters)    Stage 4 Severe CKD (GFR = 15-29 mL/min/1.73 square meters)    Stage 5 End Stage CKD (GFR <15 mL/min/1.73 square meters)  Note: GFR calculation is accurate only with a steady state creatinine    Lactic acid, plasma (w/reflex if result > 2.0) [[id number]]  (Normal) Collected: 08/02/24 0909    Lab Status: Final result Specimen: Blood from Arm, Right Updated: 08/02/24 0948     LACTIC ACID 1.5 mmol/L     Narrative:      Result may be elevated if tourniquet was used during collection.    CBC and differential [[id number]]  (Abnormal) Collected: 08/02/24 0909    Lab Status: Final result Specimen: Blood from Arm, Right Updated: 08/02/24 0930     WBC 6.29 Thousand/uL      RBC 4.01 Million/uL      Hemoglobin 13.1 g/dL      Hematocrit 38.2 %      MCV 95 fL      MCH 32.7 pg       MCHC 34.3 g/dL      RDW 12.9 %      MPV 9.3 fL      Platelets 260 Thousands/uL      nRBC 0 /100 WBCs      Segmented % 68 %      Immature Grans % 1 %      Lymphocytes % 11 %      Monocytes % 13 %      Eosinophils Relative 6 %      Basophils Relative 1 %      Absolute Neutrophils 4.37 Thousands/µL      Absolute Immature Grans 0.04 Thousand/uL      Absolute Lymphocytes 0.66 Thousands/µL      Absolute Monocytes 0.80 Thousand/µL      Eosinophils Absolute 0.39 Thousand/µL      Basophils Absolute 0.03 Thousands/µL     Blood culture #2 [[id number]] Collected: 08/02/24 0914    Lab Status: In process Specimen: Blood from Hand, Left Updated: 08/02/24 0926    Blood culture #1 [[id number]] Collected: 08/02/24 0909    Lab Status: In process Specimen: Blood from Arm, Right Updated: 08/02/24 0926                   XR chest 1 view portable   Final Result by Chauncey Canseco MD (08/02 1058)      No acute cardiopulmonary disease.            Workstation performed: [id number]         CT head without contrast   Final Result by Antonio Juarez MD (08/02 1003)      No acute intracranial abnormality.                  Workstation performed: [id number]         CT cervical spine without contrast   Final Result by Antonio Juarez MD (08/02 1011)      No cervical spine fracture or traumatic malalignment.                  Workstation performed: [id number]                    Procedures  ECG 12 Lead Documentation Only    Date/Time: 8/2/2024 2:02 PM    Performed by: Natali Ayala PA-C  Authorized by: Natali Ayala PA-C    Indications / Diagnosis:  Hypoxia  ECG reviewed by me, the ED Provider: yes    Patient location:  ED  Previous ECG:     Previous ECG:  Unavailable    Comparison to cardiac monitor: Yes    Interpretation:     Interpretation: normal    Rate:     ECG rate:  84    ECG rate assessment: normal    Rhythm:     Rhythm: sinus rhythm    Ectopy:     Ectopy: none    QRS:     QRS axis:  Normal    QRS intervals:   Normal  Conduction:     Conduction: normal    ST segments:     ST segments:  Normal  T waves:     T waves: normal    Comments:      No STEMI.   QT//441           ED Course  ED Course as of 08/02/24 1520   Fri Aug 02, 2024   0925 Temperature(!): 96.5 °F (35.8 °C)   0925 Respirations(!): 26               HEART Risk Score      Flowsheet Row Most Recent Value   Heart Score Risk Calculator    History 0 Filed at: 08/02/2024 1519   ECG 0 Filed at: 08/02/2024 1519   Age 2 Filed at: 08/02/2024 1519   Risk Factors 1 Filed at: 08/02/2024 1519   Troponin 1 Filed at: 08/02/2024 1519   HEART Score 4 Filed at: 08/02/2024 1519                          SBIRT 20yo+      Flowsheet Row Most Recent Value   Initial Alcohol Screen: US AUDIT-C     1. How often do you have a drink containing alcohol? 0 Filed at: 08/02/2024 0847   2. How many drinks containing alcohol do you have on a typical day you are drinking?  0 Filed at: 08/02/2024 0847   3a. Male UNDER 65: How often do you have five or more drinks on one occasion? 0 Filed at: 08/02/2024 0847   3b. FEMALE Any Age, or MALE 65+: How often do you have 4 or more drinks on one occassion? 0 Filed at: 08/02/2024 0847   Audit-C Score 0 Filed at: 08/02/2024 0847   LISA: How many times in the past year have you...    Used an illegal drug or used a prescription medication for non-medical reasons? Never Filed at: 08/02/2024 0847                      Medical Decision Making  Patient is a 89-year-old male with a PMHx of cholecystitis (s/p cholecystostomy tube and percutaneous drain placement, 7/11/24), glaucoma and Parkinson's disease, presenting to the ED for evaluation after a fall.     DDx including but not limited to: intracranial injury, concussion, cervical injury, metabolic abnormality, intracranial etiology, hypercarbia, hypoxia, infectious etiology including UTI, thyroid disease, hyperammonemia, delirium, dementia, overmedication.       Patient was tachypneic with mild hypothermia on  arrival. Sepsis workup obtained and unremarkable. Labs are notable for hyponatremia (sodium 130) but otherwise unremarkable. UA with moderate leukocytes/bacteria, consistent with UTI - patient was given a dose of Rocephin in the ED. CXR shows no evidence of pneumonia or other acute cardiopulmonary disease.  Patient was initially monitored off of oxygen and SpO2 was 93-94%; however, he dipped down to 87-88% and was placed on 2L/min oxygen with improvement.  He was noted to have bilateral wheezing and tachypnea on exam - suspect acute bronchitis related to recent COVID infection.  CT head and cervical spine unremarkable.  Patient was admitted to internal medicine for continued management of UTI and acute hypoxic respiratory failure secondary to COVID/bronchitis.      Amount and/or Complexity of Data Reviewed  Labs: ordered.  Radiology: ordered.    Risk  Prescription drug management.  Decision regarding hospitalization.                 Disposition  Final diagnoses:   COVID-19   Bronchitis with wheezing   Hypoxia   Hyponatremia   UTI (urinary tract infection)   Frequent falls     Time reflects when diagnosis was documented in both MDM as applicable and the Disposition within this note       Time User Action Codes Description Comment    8/2/2024 11:30 AM Natali Ayala [U07.1] COVID-19     8/2/2024 11:31 AM Natali Ayala Add [J40] Bronchitis with wheezing     8/2/2024 11:31 AM Natali Ayala Add [R09.02] Hypoxia     8/2/2024 11:31 AM Natali Ayala Add [E87.1] Hyponatremia     8/2/2024 11:31 AM Natali Ayala Add [N39.0] UTI (urinary tract infection)     8/2/2024 11:31 AM Natali Ayala Add [R29.6] Frequent falls           ED Disposition       ED Disposition   Admit    Condition   Stable    Date/Time   Fri Aug 2, 2024 1214    Comment   Case was discussed with OLGA and the patient's admission status was agreed to be Admission Status: Inpatient status to the service of Dr. Conn.            "    Follow-up Information    None         Current Discharge Medication List        CONTINUE these medications which have NOT CHANGED    Details   carbidopa-levodopa (SINEMET)  mg per tablet Take 1 tablet by mouth 3 (three) times a day      dorzolamide (TRUSOPT) 2 % ophthalmic solution Administer to both eyes 3 (three) times a day      nirmatrelvir & ritonavir (Paxlovid, 150/100,) tablet therapy pack Take 2 tablets by mouth 2 (two) times a day      pramipexole (MIRAPEX) 0.125 mg tablet TAKE ONE TABLET BY MOUTH THREE TIMES A DAY (GENERIC FOR MIRAPLEX)      Sodium Chloride 0.9 % KIT Inject 60 mL under the skin every 1 (one) hour \"Sodium chloride solution 0.9% inject 60 mg/ml subcutaneously every hour for COVID-19 low sodium for 2 Days max rate of 60 ml/hr, max vol of 1.5 L in 24 hr if removed by resident discontinue and encourage fluids\"      tamsulosin (FLOMAX) 0.4 mg Take 0.4 mg by mouth daily with dinner             No discharge procedures on file.    PDMP Review       None            ED Provider  Electronically Signed by             Natali Ayala PA-C  08/02/24 5732    "

## 2024-08-02 NOTE — ASSESSMENT & PLAN NOTE
CT head is negative for acute process. Likely related to his Parkinson's Disease.  Will check B12 level, place on fall precautions and have PT/OT evaluate for discharge needs

## 2024-08-02 NOTE — ASSESSMENT & PLAN NOTE
Pt recently completed therapy for COVID infection with Paxlovid.  CXR with no obvious infiltrate noted.  Pt has wheezing on exam so possibly related to acute bronchitis post infection.  Will resume IV Solumedrol and duonebs and plan to wean from O2 as tolerated

## 2024-08-02 NOTE — ED NOTES
Wife arrives at bedside, she is wearing mask. Pt is watching TV call bell within reach.      Jacque Rubin RN  08/02/24 [id number]

## 2024-08-03 ENCOUNTER — APPOINTMENT (INPATIENT)
Dept: RADIOLOGY | Facility: HOSPITAL | Age: 89
DRG: 689 | End: 2024-08-03
Payer: COMMERCIAL

## 2024-08-03 PROBLEM — E83.42 HYPOMAGNESEMIA: Status: ACTIVE | Noted: 2024-08-03

## 2024-08-03 LAB
ANION GAP SERPL CALCULATED.3IONS-SCNC: 7 MMOL/L (ref 4–13)
BASOPHILS # BLD AUTO: 0 THOUSANDS/ÂΜL (ref 0–0.1)
BASOPHILS NFR BLD AUTO: 0 % (ref 0–1)
BUN SERPL-MCNC: 14 MG/DL (ref 5–25)
CALCIUM SERPL-MCNC: 7.6 MG/DL (ref 8.4–10.2)
CHLORIDE SERPL-SCNC: 102 MMOL/L (ref 96–108)
CO2 SERPL-SCNC: 24 MMOL/L (ref 21–32)
CREAT SERPL-MCNC: 0.52 MG/DL (ref 0.6–1.3)
EOSINOPHIL # BLD AUTO: 0 THOUSAND/ÂΜL (ref 0–0.61)
EOSINOPHIL NFR BLD AUTO: 0 % (ref 0–6)
ERYTHROCYTE [DISTWIDTH] IN BLOOD BY AUTOMATED COUNT: 12.7 % (ref 11.6–15.1)
GFR SERPL CREATININE-BSD FRML MDRD: 93 ML/MIN/1.73SQ M
GLUCOSE SERPL-MCNC: 194 MG/DL (ref 65–140)
HCT VFR BLD AUTO: 30.9 % (ref 36.5–49.3)
HGB BLD-MCNC: 10.7 G/DL (ref 12–17)
IMM GRANULOCYTES # BLD AUTO: 0.01 THOUSAND/UL (ref 0–0.2)
IMM GRANULOCYTES NFR BLD AUTO: 0 % (ref 0–2)
LYMPHOCYTES # BLD AUTO: 0.3 THOUSANDS/ÂΜL (ref 0.6–4.47)
LYMPHOCYTES NFR BLD AUTO: 9 % (ref 14–44)
MAGNESIUM SERPL-MCNC: 1.6 MG/DL (ref 1.9–2.7)
MCH RBC QN AUTO: 32.9 PG (ref 26.8–34.3)
MCHC RBC AUTO-ENTMCNC: 34.6 G/DL (ref 31.4–37.4)
MCV RBC AUTO: 95 FL (ref 82–98)
MONOCYTES # BLD AUTO: 0.09 THOUSAND/ÂΜL (ref 0.17–1.22)
MONOCYTES NFR BLD AUTO: 3 % (ref 4–12)
MRSA NOSE QL CULT: ABNORMAL
MRSA NOSE QL CULT: ABNORMAL
NEUTROPHILS # BLD AUTO: 2.88 THOUSANDS/ÂΜL (ref 1.85–7.62)
NEUTS SEG NFR BLD AUTO: 88 % (ref 43–75)
NRBC BLD AUTO-RTO: 0 /100 WBCS
PLATELET # BLD AUTO: 224 THOUSANDS/UL (ref 149–390)
PMV BLD AUTO: 9.1 FL (ref 8.9–12.7)
POTASSIUM SERPL-SCNC: 3.8 MMOL/L (ref 3.5–5.3)
RBC # BLD AUTO: 3.25 MILLION/UL (ref 3.88–5.62)
SODIUM SERPL-SCNC: 133 MMOL/L (ref 135–147)
WBC # BLD AUTO: 3.28 THOUSAND/UL (ref 4.31–10.16)

## 2024-08-03 PROCEDURE — 83735 ASSAY OF MAGNESIUM: CPT | Performed by: STUDENT IN AN ORGANIZED HEALTH CARE EDUCATION/TRAINING PROGRAM

## 2024-08-03 PROCEDURE — 94760 N-INVAS EAR/PLS OXIMETRY 1: CPT

## 2024-08-03 PROCEDURE — 99232 SBSQ HOSP IP/OBS MODERATE 35: CPT | Performed by: STUDENT IN AN ORGANIZED HEALTH CARE EDUCATION/TRAINING PROGRAM

## 2024-08-03 PROCEDURE — 97167 OT EVAL HIGH COMPLEX 60 MIN: CPT

## 2024-08-03 PROCEDURE — 70450 CT HEAD/BRAIN W/O DYE: CPT

## 2024-08-03 PROCEDURE — 80048 BASIC METABOLIC PNL TOTAL CA: CPT | Performed by: STUDENT IN AN ORGANIZED HEALTH CARE EDUCATION/TRAINING PROGRAM

## 2024-08-03 PROCEDURE — 85025 COMPLETE CBC W/AUTO DIFF WBC: CPT | Performed by: STUDENT IN AN ORGANIZED HEALTH CARE EDUCATION/TRAINING PROGRAM

## 2024-08-03 PROCEDURE — 94640 AIRWAY INHALATION TREATMENT: CPT

## 2024-08-03 RX ORDER — HALOPERIDOL 1 MG/1
0.5 TABLET ORAL ONCE
Status: COMPLETED | OUTPATIENT
Start: 2024-08-03 | End: 2024-08-03

## 2024-08-03 RX ORDER — IPRATROPIUM BROMIDE AND ALBUTEROL SULFATE 2.5; .5 MG/3ML; MG/3ML
3 SOLUTION RESPIRATORY (INHALATION) EVERY 6 HOURS PRN
Status: DISCONTINUED | OUTPATIENT
Start: 2024-08-03 | End: 2024-08-07 | Stop reason: HOSPADM

## 2024-08-03 RX ORDER — MAGNESIUM SULFATE HEPTAHYDRATE 40 MG/ML
2 INJECTION, SOLUTION INTRAVENOUS ONCE
Status: COMPLETED | OUTPATIENT
Start: 2024-08-03 | End: 2024-08-03

## 2024-08-03 RX ORDER — HALOPERIDOL 1 MG/1
1 TABLET ORAL ONCE
Status: DISCONTINUED | OUTPATIENT
Start: 2024-08-03 | End: 2024-08-03

## 2024-08-03 RX ADMIN — METHYLPREDNISOLONE SODIUM SUCCINATE 60 MG: 125 INJECTION, POWDER, FOR SOLUTION INTRAMUSCULAR; INTRAVENOUS at 05:35

## 2024-08-03 RX ADMIN — MAGNESIUM SULFATE HEPTAHYDRATE 2 G: 40 INJECTION, SOLUTION INTRAVENOUS at 08:35

## 2024-08-03 RX ADMIN — PRAMIPEXOLE DIHYDROCHLORIDE 0.12 MG: 0.25 TABLET ORAL at 21:16

## 2024-08-03 RX ADMIN — HEPARIN SODIUM 5000 UNITS: 5000 INJECTION INTRAVENOUS; SUBCUTANEOUS at 05:35

## 2024-08-03 RX ADMIN — PRAMIPEXOLE DIHYDROCHLORIDE 0.12 MG: 0.25 TABLET ORAL at 08:34

## 2024-08-03 RX ADMIN — PRAMIPEXOLE DIHYDROCHLORIDE 0.12 MG: 0.25 TABLET ORAL at 15:32

## 2024-08-03 RX ADMIN — TAMSULOSIN HYDROCHLORIDE 0.4 MG: 0.4 CAPSULE ORAL at 15:32

## 2024-08-03 RX ADMIN — HEPARIN SODIUM 5000 UNITS: 5000 INJECTION INTRAVENOUS; SUBCUTANEOUS at 15:33

## 2024-08-03 RX ADMIN — HALOPERIDOL 0.5 MG: 1 TABLET ORAL at 01:44

## 2024-08-03 RX ADMIN — CARBIDOPA AND LEVODOPA 1 TABLET: 25; 100 TABLET ORAL at 15:33

## 2024-08-03 RX ADMIN — CEFTRIAXONE 1000 MG: 1 INJECTION, SOLUTION INTRAVENOUS at 12:39

## 2024-08-03 RX ADMIN — IPRATROPIUM BROMIDE AND ALBUTEROL SULFATE 3 ML: 2.5; .5 SOLUTION RESPIRATORY (INHALATION) at 08:02

## 2024-08-03 RX ADMIN — METHYLPREDNISOLONE SODIUM SUCCINATE 60 MG: 125 INJECTION, POWDER, FOR SOLUTION INTRAMUSCULAR; INTRAVENOUS at 21:16

## 2024-08-03 RX ADMIN — DORZOLAMIDE HYDROCHLORIDE 1 DROP: 20 SOLUTION OPHTHALMIC at 15:44

## 2024-08-03 RX ADMIN — IPRATROPIUM BROMIDE AND ALBUTEROL SULFATE 3 ML: 2.5; .5 SOLUTION RESPIRATORY (INHALATION) at 02:56

## 2024-08-03 RX ADMIN — DORZOLAMIDE HYDROCHLORIDE 1 DROP: 20 SOLUTION OPHTHALMIC at 08:35

## 2024-08-03 RX ADMIN — HEPARIN SODIUM 5000 UNITS: 5000 INJECTION INTRAVENOUS; SUBCUTANEOUS at 21:16

## 2024-08-03 RX ADMIN — METHYLPREDNISOLONE SODIUM SUCCINATE 60 MG: 125 INJECTION, POWDER, FOR SOLUTION INTRAMUSCULAR; INTRAVENOUS at 15:37

## 2024-08-03 RX ADMIN — CARBIDOPA AND LEVODOPA 1 TABLET: 25; 100 TABLET ORAL at 21:16

## 2024-08-03 RX ADMIN — DORZOLAMIDE HYDROCHLORIDE 1 DROP: 20 SOLUTION OPHTHALMIC at 21:17

## 2024-08-03 RX ADMIN — CARBIDOPA AND LEVODOPA 1 TABLET: 25; 100 TABLET ORAL at 08:35

## 2024-08-03 NOTE — PROGRESS NOTES
[facility]  Progress Note  Name: Edwin Elkins I  MRN: [id number]  Unit/Bed#: [address] [address] I Date of Admission: 8/2/2024   Date of Service: 8/3/2024 I Hospital Day: 1    Assessment & Plan   Hypomagnesemia  Assessment & Plan  IV Magnesium ordered for replacement. Will follow electrolytes       Hyponatremia  Assessment & Plan  Improved from presentation after receiving NS bolus.  Possibly due to dehydration from poor po intake.  Will follow BMP     Acute metabolic encephalopathy  Assessment & Plan  Differential includes infection related (UTI and recent COVID) vs metabolic derangement.  CT head is negative for acute process.  Pt still remains confused today as he does not remember falling again.     Acute cystitis  Assessment & Plan  Resume IV Ceftriaxone and follow urine culture     Acute hypoxic respiratory failure (HCC)  Assessment & Plan  Pt recently completed therapy for COVID infection with Paxlovid.  CXR with no obvious infiltrate noted. Will resume IV Solumedrol and duonebs for suspected bronchitis.  Will wean from O2 as tolerated     Frequent falls  Assessment & Plan  Likely related to his Parkinson's Disease.  Pt had another fall today.  Repeat CT head is negative for acute process.  Continue fall precautions.  PT/OT evaluation for discharge needs     Parkinson's disease with dyskinesia  Assessment & Plan  Resume Sinemet and Pramipexole.  Continue fall precautions. PT/OT evaluation for discharge needs     Perforated gallbladder s/p IR drain placement  Assessment & Plan  Drain in place. Continue local care             VTE Pharmacologic Prophylaxis:    Heparin     Mobility:   Basic Mobility Inpatient Raw Score: 10  JH-HLM Goal: 4: Move to chair/commode  JH-HLM Achieved: 2: Bed activities/Dependent transfer  JH-HLM Goal NOT achieved. Continue with multidisciplinary rounding and encourage appropriate mobility to improve upon JH-HLM goals.    Patient Centered Rounds: I performed bedside  rounds with nursing staff today.   Discussions with Specialists or Other Care Team Provider: Case Management     Education and Discussions with Family / Patient: Updated  (wife) via phone.    Total Time Spent on Date of Encounter in care of patient: 35 mins. This time was spent on one or more of the following: performing physical exam; counseling and coordination of care; obtaining or reviewing history; documenting in the medical record; reviewing/ordering tests, medications or procedures; communicating with other healthcare professionals and discussing with patient's family/caregivers.    Current Length of Stay: 1 day(s)  Current Patient Status: Inpatient   Certification Statement: The patient will continue to require additional inpatient hospital stay due to altered mental status; UTI treatment; PT evaluation   Discharge Plan: Anticipate discharge in 48 hrs to discharge location to be determined pending rehab evaluations.    Code Status: Level 3 - DNAR and DNI    Subjective:   Pt was seen and examined at bedside.  Pt had another fall today.  However he does not remember falling.  Remains confused. Denies any headache, chest pain or abdominal pain.      Objective:     Vitals:   Temp (24hrs), Av.7 °F (36.5 °C), Min:97.5 °F (36.4 °C), Max:98 °F (36.7 °C)    Temp:  [97.5 °F (36.4 °C)-98 °F (36.7 °C)] 97.6 °F (36.4 °C)  HR:  [] 101  Resp:  [20-29] 20  BP: (101-137)/(56-80) 105/68  SpO2:  [87 %-98 %] 96 %  Body mass index is 18.66 kg/m².     Input and Output Summary (last 24 hours):     Intake/Output Summary (Last 24 hours) at 8/3/2024 1031  Last data filed at 8/3/2024 0608  Gross per 24 hour   Intake 170 ml   Output 27 ml   Net 143 ml       Physical Exam:   General: in no acute distress  HEENT: bruise noted on left side of forehead  CVS: RRR, no murmurs appreciated  Lungs: CTAL, no wheezing or rales appreciated  Abdomen: soft, nondistended, bowel sounds normal, nontender upon palpation, no  guarding or rebound tenderness, KRUNAL drain noted in place   Extremities: no edema, no calf swelling or tenderness  Neuro: alert to person and place but not time; tremors in upper extremities noted; follows commands intermittently   Psych: slightly anxious       Additional Data:     Labs:  Results from last 7 days   Lab Units 08/03/24  0543   WBC Thousand/uL 3.28*   HEMOGLOBIN g/dL 10.7*   HEMATOCRIT % 30.9*   PLATELETS Thousands/uL 224   SEGS PCT % 88*   LYMPHO PCT % 9*   MONO PCT % 3*   EOS PCT % 0     Results from last 7 days   Lab Units 08/03/24  0543 08/02/24  0909   SODIUM mmol/L 133* 130*   POTASSIUM mmol/L 3.8 4.1   CHLORIDE mmol/L 102 98   CO2 mmol/L 24 26   BUN mg/dL 14 9   CREATININE mg/dL 0.52* 0.52*   ANION GAP mmol/L 7 6   CALCIUM mg/dL 7.6* 8.1*   ALBUMIN g/dL  --  2.7*   TOTAL BILIRUBIN mg/dL  --  0.68   ALK PHOS U/L  --  65   ALT U/L  --  12   AST U/L  --  36   GLUCOSE RANDOM mg/dL 194* 78     Results from last 7 days   Lab Units 08/02/24  0909   INR  1.12             Results from last 7 days   Lab Units 08/02/24  0909   LACTIC ACID mmol/L 1.5   PROCALCITONIN ng/ml 0.10       Lines/Drains:  Invasive Devices       Peripheral Intravenous Line  Duration             Peripheral IV 08/02/24 Right;Ventral (anterior) Forearm 1 day              Drain  Duration             Abscess Drain Abdomen 22 days    Cholecystostomy Tube 22 days                          Imaging: Reviewed radiology reports from this admission including: CT head    Recent Cultures (last 7 days):   Results from last 7 days   Lab Units 08/02/24  0914 08/02/24  0909   BLOOD CULTURE  Received in Microbiology Lab. Culture in Progress. Received in Microbiology Lab. Culture in Progress.       Last 24 Hours Medication List:   Current Facility-Administered Medications   Medication Dose Route Frequency Provider Last Rate    carbidopa-levodopa  1 tablet Oral TID Kathy Davison MD      cefTRIAXone  1,000 mg Intravenous Q24H Kathy Davison MD       dorzolamide  1 drop Both Eyes TID Kathy Davison MD      heparin (porcine)  5,000 Units Subcutaneous Q8H SIMON Kathy Davison MD      ipratropium-albuterol  3 mL Nebulization Q6H PRN Kathy Davison MD      magnesium sulfate  2 g Intravenous Once Kathy Davison MD 2 g (08/03/24 0835)    methylPREDNISolone sodium succinate  60 mg Intravenous Q8H SIMON Kathy Davison MD      pramipexole  0.125 mg Oral TID Kathy Davison MD      tamsulosin  0.4 mg Oral Daily With Dinner Kathy Davison MD          Today, Patient Was Seen By: Kathy Davison MD    **Please Note: This note may have been constructed using a voice recognition system.**

## 2024-08-03 NOTE — QUICK NOTE
Saw patient for unwitnessed fall. Patient was found kneeling at the side of his bed with hands holding the roll after bed alarm went off for unknown period of time as staff were all busy in rooms with patients per charge RN. Patient denied any pain,no abrasions or redness on knees per RN.     Patient was placed on virtual observation after the incident.  Patient was given Haldol 0.5 mg p.o. x 1 for confusion.    Patient resting in bed currently and states that he is trying to sleep. Patient denies any pain.  Patient does not know how he got to the floor earlier.    Will order CTH to r/o hemorrhage stat.  Fall precautions.

## 2024-08-03 NOTE — OCCUPATIONAL THERAPY NOTE
OT EVALUATION       08/03/24 1338   OT Last Visit   OT Visit Date 08/03/24   Note Type   Note type Evaluation   Pain Assessment   Pain Assessment Tool 0-10   Pain Score No Pain   Restrictions/Precautions   Other Precautions Cognitive;Chair Alarm;Bed Alarm;1:1;Fall Risk;Airborne/isolation;Contact/isolation  (cholecystectomy tube, virtual 1:1)   Home Living   Type of Home Other (Comment)  (admitted from STR at [facility])   Home Equipment Walker   Additional Comments was living with wife prior to STR   Prior Function   Level of [place] Needs assistance with ADLs;Needs assistance with functional mobility;Needs assistance with IADLS   Lives With Facility staff   Receives Help From Personal care attendant   IADLs Family/Friend/Other provides transportation;Family/Friend/Other provides meals;Family/Friend/Other provides medication management   Falls in the last 6 months 5 to 10  (multiple falls recently per chart)   Lifestyle   Reciprocal Relationships wife present for end of session   Intrinsic Gratification Neela curry   General   Additional Pertinent History Parkinson History   Additional General Comments Patient admitted from STR with falls and hypoxia with recent COVID.   ADL   Eating Assistance 4  Minimal Assistance   Grooming Assistance 4  Minimal Assistance   Grooming Deficit Wash/dry face  (seated in chair)   UB Bathing Assistance 4  Minimal Assistance   LB Bathing Assistance 2  Maximal Assistance   UB Dressing Assistance 4  Minimal Assistance   LB Dressing Assistance 2  Maximal Assistance   Toileting Assistance  2  Maximal Assistance   Bed Mobility   Supine to Sit 2  Maximal assistance   Additional items Assist x 1;Verbal cues;LE management   Transfers   Sit to Stand 3  Moderate assistance   Additional items Assist x 2;Verbal cues   Stand to Sit 3  Moderate assistance   Additional items Assist x 2;Verbal cues   Functional Mobility   Functional Mobility 2  Maximal assistance   Additional Comments assist of 2, few  steps bed to chair with RW   Balance   Static Sitting Fair   Dynamic Sitting Fair -   Static Standing Poor -   Dynamic Standing Poor -   Activity Tolerance   Activity Tolerance Patient limited by fatigue  (cognition, weakness)   Nurse Made Aware yes, Mignon   RUE Assessment   RUE Assessment X  (shoulder flexion AAROM to 90, AROM 10 degrees, elbow WFL,  grossly 3+/5, arthrits deformities in hands)   LUE Assessment   LUE Assessment X  (shoulder flexion AAROM to 90, AROM 10 degrees, elbow WFL,  grossly 3+/5, arthrits deformities in hands)   Cognition   Overall Cognitive Status Impaired   Arousal/Participation Cooperative   Attention Attends with cues to redirect   Orientation Level Oriented to person;Disoriented to place;Disoriented to time;Disoriented to situation  (knows its 2024)   Following Commands Follows one step commands with increased time or repetition   Assessment   Limitation Decreased ADL status;Decreased UE strength;Decreased Safe judgement during ADL;Decreased endurance;Decreased self-care trans;Decreased high-level ADLs;Decreased cognition;Decreased UE ROM  (decreased balance and mobility)   Prognosis Good   Assessment Patient evaluated by Occupational Therapy.  Patient admitted with <principal problem not specified>.  The patients occupational profile, medical and therapy history includes a extensive additional review of physical, cognitive, or psychosocial history related to current functional performance.  Comorbidities affecting functional mobility and ADLS include: arthritis, glaucoma, and Parkinsons.  Prior to admission, patient was requiring assist for functional mobility with walker, requiring assist for ADLS, requiring assist for IADLS, and in short term rehab.  The evaluation identifies the following performance deficits: weakness, decreased ROM, impaired balance, decreased endurance, increased fall risk, new onset of impairment of functional mobility, decreased ADLS, decreased IADLS,  decreased activity tolerance, decreased safety awareness, impaired judgement, decreased cognition, and decreased strength, that result in activity limitations and/or participation restrictions. This evaluation requires clinical decision making of high complexity, because the patient presents with comorbidites that affect occupational performance and required significant modification of tasks or assistance with consideration of multiple treatment options.  The Barthel Index was used as a functional outcome tool presenting with a score of Barthel Index Score: 30, indicating marked limitations of functional mobility and ADLS.  The patient's raw score on the -PAC Daily Activity Inpatient Short Form is 12. A raw score of less than 19 suggests the patient may benefit from discharge to post-acute rehabilitation services. Please refer to the recommendation of the Occupational Therapist for safe discharge planning.  Patient will benefit from skilled Occupational Therapy services to address above deficits and facilitate a safe return to prior level of function.   Goals   Patient Goals to go home   STG Time Frame   (1-7 days)   Short Term Goal  Goals established to promote Patient Goals: to go home:  Eating: supervision; Grooming: supervision seated; Bathing: mod assist; Upper Body Dressing supervision; Lower Body Dressing: mod assist; Toileting: mod assist; Patient will increase ambulatory commode transfer to mod assist of 2 with rolling walker to increase performance and safety with ADLS and functional mobility; Patient will increase standing tolerance to 2 minutes during ADL task to decrease assistance level and decrease fall risk; Patient will increase bed mobility to mod assist in preparation for ADLS and transfers; Patient will increase functional mobility to and from bathroom with rolling walker with mod assist of 2 to increase performance with ADLS and to use a toilet; Patient will tolerate 5 minutes of UE  ROM/strengthening to increase general activity tolerance and performance in ADLS/IADLS; Patient will improve functional activity tolerance to 10 minutes of sustained functional tasks to increase participation in basic self-care and decrease assistance level;  Patient will increase dynamic sitting balance to fair to improve the ability to sit at edge of bed or on a chair for ADLS;  Patient will increase dynamic standing balance to poor to improve postural stability and decrease fall risk during standing ADLS and transfers.   LTG Time Frame   (8-14 days)   Long Term Goal Bathing: min assist;  Lower Body Dressing: mod assist; Toileting: mod assist; Patient will increase ambulatory commode transfer to mod assist with rolling walker to increase performance and safety with ADLS and functional mobility; Patient will increase standing tolerance to 4 minutes during ADL task to decrease assistance level and decrease fall risk; Patient will increase bed mobility to min assist in preparation for ADLS and transfers; Patient will increase functional mobility to and from bathroom with rolling walker with mod assist to increase performance with ADLS and to use a toilet; Patient will tolerate 10 minutes of UE ROM/strengthening to increase general activity tolerance and performance in ADLS/IADLS; Patient will improve functional activity tolerance to 20 minutes of sustained functional tasks to increase participation in basic self-care and decrease assistance level;  Patient will increase dynamic sitting balance to fair+ to improve the ability to sit at edge of bed or on a chair for ADLS;  Patient will increase dynamic standing balance to poor+ to improve postural stability and decrease fall risk during standing ADLS and transfers.    Pt will score >/= 16/24 on AM-PAC Daily Activity Inpatient scale to promote safe independence with ADLs and functional mobility; Pt will score >/= 60/100 on Barthel Index in order to decrease caregiver  assistance needed and increase ability to perform ADLs and functional mobility.   Plan   Treatment Interventions ADL retraining;Functional transfer training;UE strengthening/ROM;Endurance training;Patient/family training;Equipment evaluation/education;Activityengagement;Compensatory technique education   Goal Expiration Date 08/17/24   OT Frequency 3-5x/wk   Discharge Recommendation   Rehab Resource Intensity Level, OT II (Moderate Resource Intensity)   AM-PAC Daily Activity Inpatient   Lower Body Dressing 1   Bathing 1   Toileting 1   Upper Body Dressing 3   Grooming 3   Eating 3   Daily Activity Raw Score 12   Daily Activity Standardized Score (Calc for Raw Score >=11) 30.6   AM-PAC Applied Cognition Inpatient   Following a Speech/Presentation 2   Understanding Ordinary Conversation 3   Taking Medications 1   Remembering Where Things Are Placed or Put Away 1   Remembering List of 4-5 Errands 1   Taking Care of Complicated Tasks 1   Applied Cognition Raw Score 9   Applied Cognition Standardized Score 22.48   Barthel Index   Feeding 5   Bathing 0   Grooming Score 0   Dressing Score 0   Bladder Score 5   Bowels Score 10   Toilet Use Score 5   Transfers (Bed/Chair) Score 5   Mobility (Level Surface) Score 0   Stairs Score 0   Barthel Index Score 30   Licensure   [place] License Number  Sandie Mak MS OTR/L [id number]

## 2024-08-03 NOTE — ASSESSMENT & PLAN NOTE
Likely related to his Parkinson's Disease.  Pt had another fall today.  Repeat CT head is negative for acute process.  Continue fall precautions.  PT/OT evaluation for discharge needs

## 2024-08-03 NOTE — PLAN OF CARE
Problem: Potential for Falls  Goal: Patient will remain free of falls  Description: INTERVENTIONS:  - Educate patient/family on patient safety including physical limitations  - Instruct patient to call for assistance with activity   - Consult OT/PT to assist with strengthening/mobility   - Keep Call bell within reach  - Keep bed low and locked with side rails adjusted as appropriate  - Keep care items and personal belongings within reach  - Initiate and maintain comfort rounds  - Make Fall Risk Sign visible to staff  - Offer Toileting every 2 Hours, in advance of need  - Initiate/Maintain Bed/Chair alarm  - Obtain necessary fall risk management equipment: walker  - Apply yellow socks and bracelet for high fall risk patients  - Consider moving patient to room near nurses station  Outcome: Progressing

## 2024-08-03 NOTE — RESPIRATORY THERAPY NOTE
RT Protocol Note  Edwin Elkins 89 y.o. male MRN: [id number]  Unit/Bed#: [address] [place] [address] Encounter: [id number]    Assessment    Active Problems:    Parkinson's disease with dyskinesia    Frequent falls    Acute hypoxic respiratory failure (HCC)    Acute cystitis    Acute metabolic encephalopathy    Hyponatremia    Hypomagnesemia      Home Pulmonary Medications:  None       Past Medical History:   Diagnosis Date    Arthritis     Disease of thyroid gland     Glaucoma     Parkinson disease     Pressure injury of skin     Psychiatric disorder     Renal disorder      Social History     Socioeconomic History    Marital status: /Civil Union     Spouse name: None    Number of children: None    Years of education: None    Highest education level: None   Occupational History    None   Tobacco Use    Smoking status: Former     Current packs/day: 0.50     Average packs/day: 0.5 packs/day for 73.6 years (36.8 ttl pk-yrs)     Types: Cigarettes     Start date: 1951    Smokeless tobacco: Never   Vaping Use    Vaping status: Never Used   Substance and Sexual Activity    Alcohol use: Not Currently    Drug use: Not Currently     Types: Other     Comment: Pt denies drug use    Sexual activity: Not Currently     Partners: Female     Birth control/protection: None   Other Topics Concern    None   Social History Narrative    None     Social Determinants of Health     Financial Resource Strain: Not on file   Food Insecurity: No Food Insecurity (7/11/2024)    Hunger Vital Sign     Worried About Running Out of Food in the Last Year: Never true     Ran Out of Food in the Last Year: Never true   Transportation Needs: No Transportation Needs (7/11/2024)    PRAPARE - Transportation     Lack of Transportation (Medical): No     Lack of Transportation (Non-Medical): No   Physical Activity: Not on file   Stress: Not on file   Social Connections: Not on file   Intimate Partner Violence: Not on file   Housing Stability: Low Risk  (7/11/2024)     Housing Stability Vital Sign     Unable to Pay for Housing in the Last Year: No     Number of Times Moved in the Last Year: 0     Homeless in the Last Year: No       Subjective    Subjective Data: Denies any difficulty breathing    Objective    Physical Exam:   Assessment Type: Pre-treatment  General Appearance: Eyes open/responds to voice, Sleeping  Respiratory Pattern: Normal  Chest Assessment: Chest expansion symmetrical, Trachea midline  Bilateral Breath Sounds: Diminished  Cough: None  O2 Device: NC    Vitals:  Blood pressure 105/68, pulse 101, temperature 97.6 °F (36.4 °C), resp. rate 20, weight 59 kg (130 lb 1.1 oz), SpO2 96%.          Imaging and other studies: I have personally reviewed pertinent reports.      O2 Device: NC     Plan    Respiratory Plan: No distress/Pulmonary history        Resp Comments: Will change neb to PRN

## 2024-08-03 NOTE — ASSESSMENT & PLAN NOTE
Differential includes infection related (UTI and recent COVID) vs metabolic derangement.  CT head is negative for acute process.  Pt still remains confused today as he does not remember falling again.

## 2024-08-03 NOTE — UTILIZATION REVIEW
"  Initial Clinical Review    Admission: Date/Time/Statement:   Admission Orders (From admission, onward)       Ordered        08/02/24 1215  INPATIENT ADMISSION  Once                       Orders Placed This Encounter   Procedures    INPATIENT ADMISSION     Standing Status:   Standing     Number of Occurrences:   1     Order Specific Question:   Level of Care     Answer:   Med Surg [16]     Order Specific Question:   Estimated length of stay     Answer:   More than 2 Midnights     Order Specific Question:   Certification     Answer:   I certify that inpatient services are medically necessary for this patient for a duration of greater than two midnights. See H&P and MD Progress Notes for additional information about the patient's course of treatment.     ED Arrival Information       Expected   -    Arrival   8/2/2024 08:26    Acuity   Urgent              Means of arrival   Ambulance    Escorted by   [facility]    Admission type   Emergency              Arrival complaint   ams,covid+        Chief Complaint   Patient presents with    Fall     Per BLS patient dx with covid about five days ago.  State patient has had frequent falls in the past 24 hours with a head strike.  Unknown time of last fall.  Patient denies any complaints at this time.  NH states patient with increased confusion     Initial Presentation:   89 y/o male with PMHx Parkinson's Disease, glaucoma, cholecystitis s/p cholecystostomy tube and percutaneous drain placement, 7/11/24, recent COVID infection with completion of Paxlovid on 8/1/24  - presents to [facility] ED from skilled nursing facility on 8/2/24 2nd increased confusion with 4 falls in last 24 hours - reports \"tripping and falling\" 1 day earlier and today \"fell second to his socks slipping\".  He admits to striking his forehead this morning on a \"bolt\" but denies any loss of consciousness and is not on any blood thinners or aspirin. Per EMS patient was wheezing and " SpO2 was in the low 90's so they placed him on 2L/min nasal cannula.   In ED RR 26  Room Air SpO2 87 % - 94 % with 2 L NC O2  ROS/ Exam:  awake + oriented x 3  CT Head: No Acute intracranial abnormality  CT C-Spine: No cervical spine fracture or traumatic malalignment.  Labs:  COVID positive Day 6    BS 78 Na 130  Ca+ 8.1 Troponin negative  ED Tx: O2 at 2L, Neb Tx x 2, IVF NSS x 1 L, IV Solumedrol, IV Rocephin,   Admitted Inpatient 8/2/24 at 1215 -     Hyponatremia  Pt received a 1 liter bolus of NS in the ED.  Will check osmolality studies and follow BMP      Acute metabolic encephalopathy  Differential includes infection related (UTI and recent COVID) vs metabolic derangement.  CT head is negative for acute process.  Will check B12 level, Ammonia level, TSH, treat UTI and follow clinically     Acute cystitis  Resume IV Ceftriaxone and follow urine culture      Acute hypoxic respiratory failure (HCC)  Pt recently completed therapy for COVID infection with Paxlovid.  CXR with no obvious infiltrate noted.  Pt has wheezing on exam so possibly related to acute bronchitis post infection.  Will resume IV Solumedrol and duonebs and plan to wean from O2 as tolerated      Anticipated Length of Stay/Certification Statement: Patient will be admitted on an inpatient basis with an anticipated length of stay of greater than 2 midnights secondary to respiratory failure; encephalopathy, UTI treatment, PT evaluation .     8/3/24 - DAY 2:  Pt had another fall today. However he does not remember falling. Remains confused     Hypomagnesemia  IV Magnesium ordered for replacement. Will follow electrolytes       Hyponatremia  Improved from presentation after receiving NS bolus.  Possibly due to dehydration from poor po intake.  Will follow BMP      Acute metabolic encephalopathy  Differential includes infection related (UTI and recent COVID) vs metabolic derangement.  CT head is negative for acute process.  Pt still remains confused  today as he does not remember falling again.      Acute cystitis  Resume IV Ceftriaxone and follow urine culture     8/4/24 DAY 3:  Has surpassed a 2nd midnight with active treatments and services.     More alert today however but still not back to baseline.   Monitor + replace electrolyte sprn  Continue IV Mark ROMAN Certification Statement: Continues to require additional inpatient hospital stay due to confusion; PT evaluation for discharge needs     ED Triage Vitals [08/02/24 0835]   Temperature Pulse Respirations Blood Pressure SpO2 Pain Score   (!) 96.5 °F (35.8 °C) 78 (!) 26 131/69 94 %  w/ 2 L NC O2 No Pain     Weight (last 2 days)       Date/Time Weight    08/03/24 0531 59 (130.07)    08/02/24 2225 59.2 (130.51)    08/02/24 14:55:04 59.4 (131)    08/02/24 1130 58.1 (128)       Vital Signs (last 3 days)      Date/Time Temp Pulse Resp BP MAP (mmHg) SpO2 Calculated FIO2 (%) - Nasal Cannula Nasal Cannula O2 Flow Rate (L/min) O2 Device Patient Position - Orthostatic VS   08/04/24 19:27:05 97 °F (36.1 °C) Abnormal  78 -- 114/61 79 99 % -- -- -- --   08/04/24 16:36:09 97.1 °F (36.2 °C) Abnormal  79 -- 116/64 81 97 % -- -- -- --   08/04/24 0830 97.5 °F (36.4 °C) -- -- -- -- -- -- -- -- --   08/04/24 08:15:04 96.2 °F (35.7 °C) Abnormal  79 15 119/64 82 96 % 28 2 L/min Nasal cannula --   08/03/24 22:30:23 97.6 °F (36.4 °C) 73 18 118/63 81 99 % 28 2 L/min Nasal cannula Lying   08/03/24 19:54:13 97 °F (36.1 °C) Abnormal  80 19 119/63 82 99 % 28 2 L/min Nasal cannula --   08/03/24 16:15:01 96.8 °F (36 °C) Abnormal  73 -- 103/62 76 99 % -- -- -- Sitting   08/03/24 0802 -- -- -- -- -- 96 % 28 2 L/min Nasal cannula --   08/03/24 07:25:59 97.6 °F (36.4 °C) 101 -- 105/68 80 87 % Abnormal  -- -- -- --   08/03/24 0259 -- -- 20 -- -- 97 % 28 2 L/min Nasal cannula --   08/03/24 02:43:13 97.7 °F (36.5 °C) -- 28 Abnormal  120/71 87 -- -- -- -- --   08/02/24 21:39:25 97.8 °F (36.6 °C) 109 Abnormal  20 135/80 98 98 % -- -- --  --   08/02/24 2007 -- 93 20 -- -- 93 % -- -- None (Room air) --   08/02/24 15:41:59 97.8 °F (36.6 °C) 86 -- 120/66 84 96 % -- -- Nasal cannula --   08/02/24 1521 -- -- -- -- -- 96 % 28 2 L/min Nasal cannula --   08/02/24 14:55:04 97.5 °F (36.4 °C) 90 -- 117/63 81 98 % -- -- Nasal cannula --   08/02/24 1415 98 °F (36.7 °C) 83 20 101/57 76 95 % 28 2 L/min Nasal cannula Sitting   08/02/24 1345 -- 88 22 108/66 83 95 % -- -- -- --   08/02/24 1330 -- 88 20 116/58 82 96 % -- -- -- --   08/02/24 1300 -- 86 22 121/67 88 96 % -- -- -- --   08/02/24 1245 -- 84 22 111/56 78 98 % -- -- Nasal cannula --   08/02/24 1145 -- 82 22 105/64 80 94 % -- -- -- --   08/02/24 1130 97.8 °F (36.6 °C) 88 24 Abnormal  128/73 96 95 % 28 2 L/min Nasal cannula --   08/02/24 1128 -- -- -- -- -- -- -- -- Nasal cannula --   08/02/24 1048 -- -- -- -- -- 95 % 28 2 L/min Nasal cannula --   08/02/24 1047 -- -- -- -- -- 88 % Abnormal  -- -- None (Room air) --   08/02/24 1045 -- 84 29 Abnormal  137/75 99 87 % Abnormal  -- -- None (Room air) --   08/02/24 0835 96.5 °F (35.8 °C) Abnormal  78 26 Abnormal  131/69 -- 94 % -- -- None (Room air) Briannein        08/03 0701  08/04 0700 08/04 0701  -   P.O.     NG/GT 5 90   IV Piggyback     Total Intake(mL/kg) 5 (0.1) 90 (1.5)   Urine (mL/kg/hr) 625 (0.4) 150 (0.2)   Emesis/NG output 2    Drains 150    Total Output 777 150   Net -772 -60        Unmeasured Urine Occurrence  1 x     Pertinent Labs/Diagnostic Test Results:   Radiology:  XR chest 1 view portable   No acute cardiopulmonary disease.      CT head without contrast   No acute intracranial abnormality.      CT cervical spine without contrast   No cervical spine fracture or traumatic malalignment.     Cardiology:  ECG 12 lead   Final Result by Soni Mckeon MD (08/02 1321)   Normal sinus rhythm   Normal ECG   When compared with ECG of 11-JUL-2024 09:34,   Nonspecific T wave abnormality now evident in Lateral leads     Results from last 7 days   Lab Units  08/03/24  0543 08/02/24 2243 08/02/24  0909   WBC Thousand/uL 3.28*  --  6.29   HEMOGLOBIN g/dL 10.7*  --  13.1   HEMATOCRIT % 30.9*  --  38.2   PLATELETS Thousands/uL 224 248 260   TOTAL NEUT ABS Thousands/µL 2.88  --  4.37     Results from last 7 days   Lab Units 08/03/24  0543 08/02/24  0909 08/01/24  1249 07/29/24  0420   SODIUM mmol/L 133* 130* 132* 134*   POTASSIUM mmol/L 3.8 4.1 4.2 4.2   CHLORIDE mmol/L 102 98 100 99*   CO2 mmol/L 24 26 26 27   ANION GAP mmol/L 7 6 6 8   BUN mg/dL 14 9 12 17   CREATININE mg/dL 0.52* 0.52* 0.62 0.56   EGFR ml/min/1.73sq m 93 93 90 93   CALCIUM mg/dL 7.6* 8.1* 7.8* 7.8*   MAGNESIUM mg/dL 1.6*  --   --   --      Results from last 7 days   Lab Units 08/02/24  1247 08/02/24  0909 08/01/24  1249 07/29/24  0420   AST U/L  --  36 29 22   ALT U/L  --  12 8 8   ALK PHOS U/L  --  65 65 71   TOTAL PROTEIN g/dL  --  5.7* 5.3* 4.9*   ALBUMIN g/dL  --  2.7* 2.6* 2.6*   TOTAL BILIRUBIN mg/dL  --  0.68 0.7 0.7   AMMONIA umol/L 14*  --   --   --          Results from last 7 days   Lab Units 08/03/24  0543 08/02/24  0909 08/01/24  1249 07/29/24  0420   GLUCOSE RANDOM mg/dL 194* 78 96 87     Results from last 7 days   Lab Units 08/02/24  1247   OSMOLALITY, SERUM mmol/*     Results from last 7 days   Lab Units 08/02/24  1143 08/02/24  0909   HS TNI 0HR ng/L  --  13   HS TNI 2HR ng/L 13  --    HSTNI D2 ng/L 0  --      Results from last 7 days   Lab Units 08/02/24  0909   PROTIME seconds 14.9   INR  1.12   PTT seconds 35*     Results from last 7 days   Lab Units 08/02/24  1247   TSH 3RD GENERATON uIU/mL 2.351     Results from last 7 days   Lab Units 08/02/24  0909   PROCALCITONIN ng/ml 0.10     Results from last 7 days   Lab Units 08/02/24  0909   LACTIC ACID mmol/L 1.5     Results from last 7 days   Lab Units 08/02/24  0909   BNP pg/mL 125*     Results from last 7 days   Lab Units 08/02/24  1254 08/02/24  1247   OSMOLALITY, SERUM mmol/KG  --  274*   OSMO UR mmol/  --      Results  from last 7 days   Lab Units 08/02/24  1048   CLARITY UA  Clear   COLOR UA  Yellow   SPEC GRAV UA  1.020   PH UA  7.0   GLUCOSE UA mg/dl Negative   KETONES UA mg/dl Trace*   BLOOD UA  Negative   PROTEIN UA mg/dl Trace*   NITRITE UA  Negative   BILIRUBIN UA  Negative   UROBILINOGEN UA (BE) mg/dl 2.0*   LEUKOCYTES UA  Moderate*   WBC UA /hpf 2-4   RBC UA /hpf 0-1   BACTERIA UA /hpf Moderate*   EPITHELIAL CELLS WET PREP /hpf Occasional   MUCUS THREADS  Occasional*     Results from last 7 days   Lab Units 08/02/24  0914 08/02/24  0909   BLOOD CULTURE  Received in Microbiology Lab. Culture in Progress. Received in Microbiology Lab. Culture in Progress.     ED Treatment-Medication Administration from 08/02/2024 0826 to 08/02/2024 1450         Date/Time Order Dose Route Action     08/02/2024 0914 sodium chloride 0.9 % bolus 1,000 mL 1,000 mL Intravenous New Bag     08/02/2024 1048 ipratropium-albuterol (DUO-NEB) 0.5-2.5 mg/3 mL inhalation solution 3 mL 3 mL Nebulization Given     08/02/2024 1143 cefTRIAXone (ROCEPHIN) IVPB (premix in dextrose) 1,000 mg 50 mL 1,000 mg Intravenous New Bag     08/02/2024 1142 methylPREDNISolone sodium succinate (Solu-MEDROL) injection 60 mg 60 mg Intravenous Given     08/02/2024 1239 ipratropium-albuterol (DUO-NEB) 0.5-2.5 mg/3 mL inhalation solution 3 mL 3 mL Nebulization Given     Past Medical History:   Diagnosis Date    Arthritis     Disease of thyroid gland     Glaucoma     Parkinson disease     Pressure injury of skin     Psychiatric disorder     Renal disorder      Present on Admission:   Parkinson's disease with dyskinesia    Admitting Diagnosis: Head pain [R51.9]  Hyponatremia [E87.1]  UTI (urinary tract infection) [N39.0]  Hypoxia [R09.02]  Frequent falls [R29.6]  Bronchitis with wheezing [J40]  COVID-19 [U07.1]    Age/Sex: 89 y.o. male    Admission Orders:  Scheduled Medications:  carbidopa-levodopa, 1 tablet, Oral, TID  cefTRIAXone, 1,000 mg, Intravenous, Q24H  dorzolamide, 1  drop, Both Eyes, TID  heparin (porcine), 5,000 Units, Subcutaneous, Q8H SIMON  ipratropium-albuterol, 3 mL, Nebulization, Q6H  methylPREDNISolone sodium succinate, 60 mg, Intravenous, Q8H SIMON  pramipexole, 0.125 mg, Oral, TID  tamsulosin, 0.4 mg, Oral, Daily With Dinner    Continuous IV Infusions:  NONE    PRN Meds: NONE    Network Utilization Review Department  ATTENTION: Please call with any questions or concerns to [phone] and carefully listen to the prompts so that you are directed to the right person. All voicemails are confidential.   For Discharge needs, contact Care Management DC Support Team at [phone] opt. 2  Send all requests for admission clinical reviews, approved or denied determinations and any other requests to dedicated fax number below belonging to the [facility] where the patient is receiving treatment. List of dedicated fax numbers for the Facilities:  FACILITY NAME UR FAX NUMBER   ADMISSION DENIALS (Administrative/Medical Necessity) [phone]   DISCHARGE SUPPORT TEAM (NETWORK) [phone]   PARENT CHILD HEALTH (Maternity/NICU/Pediatrics) [phone]   [facility] [phone]   [facility] [phone]   [facility] [phone]   [facility] [phone]   [facility] [phone]   [facility] [phone]   [facility] [phone]   [facility] [phone]   [facility] [phone]   [facility] [phone]   [facility] [phone]   [facility] [phone]

## 2024-08-03 NOTE — PLAN OF CARE
Problem: Potential for Falls  Goal: Patient will remain free of falls  Description: INTERVENTIONS:  - Educate patient/family on patient safety including physical limitations  - Instruct patient to call for assistance with activity   - Consult OT/PT to assist with strengthening/mobility   - Keep Call bell within reach  - Keep bed low and locked with side rails adjusted as appropriate  - Keep care items and personal belongings within reach  - Initiate and maintain comfort rounds  - Make Fall Risk Sign visible to staff  - Offer Toileting every 2 Hours, in advance of need  - Initiate/Maintain Bed/Chair alarm  - Obtain necessary fall risk management equipment: walker  - Apply yellow socks and bracelet for high fall risk patients  - Consider moving patient to room near nurses station  Outcome: Progressing     Problem: RESPIRATORY - ADULT  Goal: Achieves optimal ventilation and oxygenation  Description: INTERVENTIONS:  - Assess for changes in respiratory status  - Assess for changes in mentation and behavior  - Position to facilitate oxygenation and minimize respiratory effort  - Oxygen administered by appropriate delivery if ordered  - Initiate smoking cessation education as indicated  - Encourage broncho-pulmonary hygiene including cough, deep breathe, Incentive Spirometry  - Assess the need for suctioning and aspirate as needed  - Assess and instruct to report SOB or any respiratory difficulty  - Respiratory Therapy support as indicated  Outcome: Progressing     Problem: Prexisting or High Potential for Compromised Skin Integrity  Goal: Skin integrity is maintained or improved  Description: INTERVENTIONS:  - Identify patients at risk for skin breakdown  - Assess and monitor skin integrity  - Assess and monitor nutrition and hydration status  - Monitor labs   - Assess for incontinence   - Turn and reposition patient  - Assist with mobility/ambulation  - Relieve pressure over bony prominences  - Avoid friction and  shearing  - Provide appropriate hygiene as needed including keeping skin clean and dry  - Evaluate need for skin moisturizer/barrier cream  - Collaborate with interdisciplinary team   - Patient/family teaching  - Consider wound care consult   Outcome: Progressing

## 2024-08-03 NOTE — ASSESSMENT & PLAN NOTE
Pt recently completed therapy for COVID infection with Paxlovid.  CXR with no obvious infiltrate noted. Will resume IV Solumedrol and duonebs for suspected bronchitis.  Will wean from O2 as tolerated

## 2024-08-03 NOTE — ASSESSMENT & PLAN NOTE
Improved from presentation after receiving NS bolus.  Possibly due to dehydration from poor po intake.  Will follow BMP

## 2024-08-03 NOTE — NURSING NOTE
Staff nurse responded to alarm and found resident in his room kneeling on both knees next to bed.  No injuries noted.  NP Jhon made aware. New order for [facility]RC noted.  Continual observation in place. Bed alarm on, bed in lowest position.

## 2024-08-04 LAB
ANION GAP SERPL CALCULATED.3IONS-SCNC: 5 MMOL/L (ref 4–13)
BASOPHILS # BLD MANUAL: 0 THOUSAND/UL (ref 0–0.1)
BASOPHILS NFR MAR MANUAL: 0 % (ref 0–1)
BUN SERPL-MCNC: 18 MG/DL (ref 5–25)
CALCIUM SERPL-MCNC: 7.6 MG/DL (ref 8.4–10.2)
CHLORIDE SERPL-SCNC: 103 MMOL/L (ref 96–108)
CO2 SERPL-SCNC: 25 MMOL/L (ref 21–32)
CREAT SERPL-MCNC: 0.47 MG/DL (ref 0.6–1.3)
EOSINOPHIL # BLD MANUAL: 0 THOUSAND/UL (ref 0–0.4)
EOSINOPHIL NFR BLD MANUAL: 0 % (ref 0–6)
ERYTHROCYTE [DISTWIDTH] IN BLOOD BY AUTOMATED COUNT: 13 % (ref 11.6–15.1)
GFR SERPL CREATININE-BSD FRML MDRD: 97 ML/MIN/1.73SQ M
GLUCOSE SERPL-MCNC: 159 MG/DL (ref 65–140)
HCT VFR BLD AUTO: 32.8 % (ref 36.5–49.3)
HGB BLD-MCNC: 11.3 G/DL (ref 12–17)
LYMPHOCYTES # BLD AUTO: 0.45 THOUSAND/UL (ref 0.6–4.47)
LYMPHOCYTES # BLD AUTO: 6 % (ref 14–44)
MAGNESIUM SERPL-MCNC: 1.8 MG/DL (ref 1.9–2.7)
MCH RBC QN AUTO: 32.8 PG (ref 26.8–34.3)
MCHC RBC AUTO-ENTMCNC: 34.5 G/DL (ref 31.4–37.4)
MCV RBC AUTO: 95 FL (ref 82–98)
MONOCYTES # BLD AUTO: 0.23 THOUSAND/UL (ref 0–1.22)
MONOCYTES NFR BLD: 3 % (ref 4–12)
NEUTROPHILS # BLD MANUAL: 6.85 THOUSAND/UL (ref 1.85–7.62)
NEUTS SEG NFR BLD AUTO: 91 % (ref 43–75)
PLATELET # BLD AUTO: 227 THOUSANDS/UL (ref 149–390)
PLATELET BLD QL SMEAR: ADEQUATE
PMV BLD AUTO: 9.2 FL (ref 8.9–12.7)
POTASSIUM SERPL-SCNC: 3.8 MMOL/L (ref 3.5–5.3)
RBC # BLD AUTO: 3.44 MILLION/UL (ref 3.88–5.62)
RBC MORPH BLD: NORMAL
SODIUM SERPL-SCNC: 133 MMOL/L (ref 135–147)
WBC # BLD AUTO: 7.53 THOUSAND/UL (ref 4.31–10.16)

## 2024-08-04 PROCEDURE — 85007 BL SMEAR W/DIFF WBC COUNT: CPT | Performed by: STUDENT IN AN ORGANIZED HEALTH CARE EDUCATION/TRAINING PROGRAM

## 2024-08-04 PROCEDURE — 99232 SBSQ HOSP IP/OBS MODERATE 35: CPT | Performed by: STUDENT IN AN ORGANIZED HEALTH CARE EDUCATION/TRAINING PROGRAM

## 2024-08-04 PROCEDURE — 85027 COMPLETE CBC AUTOMATED: CPT | Performed by: STUDENT IN AN ORGANIZED HEALTH CARE EDUCATION/TRAINING PROGRAM

## 2024-08-04 PROCEDURE — 80048 BASIC METABOLIC PNL TOTAL CA: CPT | Performed by: STUDENT IN AN ORGANIZED HEALTH CARE EDUCATION/TRAINING PROGRAM

## 2024-08-04 PROCEDURE — 83735 ASSAY OF MAGNESIUM: CPT | Performed by: STUDENT IN AN ORGANIZED HEALTH CARE EDUCATION/TRAINING PROGRAM

## 2024-08-04 RX ORDER — MAGNESIUM SULFATE HEPTAHYDRATE 40 MG/ML
2 INJECTION, SOLUTION INTRAVENOUS ONCE
Status: COMPLETED | OUTPATIENT
Start: 2024-08-04 | End: 2024-08-04

## 2024-08-04 RX ADMIN — CARBIDOPA AND LEVODOPA 1 TABLET: 25; 100 TABLET ORAL at 21:20

## 2024-08-04 RX ADMIN — HEPARIN SODIUM 5000 UNITS: 5000 INJECTION INTRAVENOUS; SUBCUTANEOUS at 21:19

## 2024-08-04 RX ADMIN — DORZOLAMIDE HYDROCHLORIDE 1 DROP: 20 SOLUTION OPHTHALMIC at 15:39

## 2024-08-04 RX ADMIN — CARBIDOPA AND LEVODOPA 1 TABLET: 25; 100 TABLET ORAL at 08:05

## 2024-08-04 RX ADMIN — HEPARIN SODIUM 5000 UNITS: 5000 INJECTION INTRAVENOUS; SUBCUTANEOUS at 05:29

## 2024-08-04 RX ADMIN — PRAMIPEXOLE DIHYDROCHLORIDE 0.12 MG: 0.25 TABLET ORAL at 08:05

## 2024-08-04 RX ADMIN — PRAMIPEXOLE DIHYDROCHLORIDE 0.12 MG: 0.25 TABLET ORAL at 21:20

## 2024-08-04 RX ADMIN — METHYLPREDNISOLONE SODIUM SUCCINATE 60 MG: 125 INJECTION, POWDER, FOR SOLUTION INTRAMUSCULAR; INTRAVENOUS at 21:20

## 2024-08-04 RX ADMIN — DORZOLAMIDE HYDROCHLORIDE 1 DROP: 20 SOLUTION OPHTHALMIC at 21:20

## 2024-08-04 RX ADMIN — HEPARIN SODIUM 5000 UNITS: 5000 INJECTION INTRAVENOUS; SUBCUTANEOUS at 13:00

## 2024-08-04 RX ADMIN — CARBIDOPA AND LEVODOPA 1 TABLET: 25; 100 TABLET ORAL at 15:39

## 2024-08-04 RX ADMIN — DORZOLAMIDE HYDROCHLORIDE 1 DROP: 20 SOLUTION OPHTHALMIC at 08:26

## 2024-08-04 RX ADMIN — PRAMIPEXOLE DIHYDROCHLORIDE 0.12 MG: 0.25 TABLET ORAL at 15:39

## 2024-08-04 RX ADMIN — METHYLPREDNISOLONE SODIUM SUCCINATE 60 MG: 125 INJECTION, POWDER, FOR SOLUTION INTRAMUSCULAR; INTRAVENOUS at 05:29

## 2024-08-04 RX ADMIN — CEFTRIAXONE 1000 MG: 1 INJECTION, SOLUTION INTRAVENOUS at 12:59

## 2024-08-04 RX ADMIN — MAGNESIUM SULFATE HEPTAHYDRATE 2 G: 40 INJECTION, SOLUTION INTRAVENOUS at 08:06

## 2024-08-04 RX ADMIN — TAMSULOSIN HYDROCHLORIDE 0.4 MG: 0.4 CAPSULE ORAL at 15:39

## 2024-08-04 RX ADMIN — METHYLPREDNISOLONE SODIUM SUCCINATE 60 MG: 125 INJECTION, POWDER, FOR SOLUTION INTRAMUSCULAR; INTRAVENOUS at 13:00

## 2024-08-04 NOTE — ASSESSMENT & PLAN NOTE
Pt recently completed therapy for COVID infection with Paxlovid.  CXR with no obvious infiltrate noted. Will resume IV Solumedrol and duonebs for suspected bronchitis and wean from O2 as tolerated

## 2024-08-04 NOTE — PROGRESS NOTES
[facility]  Progress Note  Name: Edwin Elkins I  MRN: [id number]  Unit/Bed#: [address] [place], [place] [id number] I Date of Admission: 8/2/2024   Date of Service: 8/4/2024 I Hospital Day: 2    Assessment & Plan   Hypomagnesemia  Assessment & Plan  IV Magnesium ordered for replacement. Will follow electrolytes       Hyponatremia  Assessment & Plan  Possibly due to dehydration from poor po intake.  Will follow BMP     Acute metabolic encephalopathy  Assessment & Plan  Differential includes infection related (UTI and recent COVID) vs metabolic derangement.  CT head is negative for acute process. Will follow clinically     Acute cystitis  Assessment & Plan  Resume IV Ceftriaxone    Acute hypoxic respiratory failure (HCC)  Assessment & Plan  Pt recently completed therapy for COVID infection with Paxlovid.  CXR with no obvious infiltrate noted. Will resume IV Solumedrol and duonebs for suspected bronchitis and wean from O2 as tolerated     Frequent falls  Assessment & Plan  Likely related to his Parkinson's Disease. CT head is negative for acute process.  Continue fall precautions.  PT/OT evaluation for discharge needs     Parkinson's disease with dyskinesia  Assessment & Plan  Resume Sinemet and Pramipexole.  Continue fall precautions. PT/OT evaluation for discharge needs     Perforated gallbladder s/p IR drain placement  Assessment & Plan  Occurred during prior admission.  Drain in place. Continue local care           VTE Pharmacologic Prophylaxis:   Moderate Risk (Score 3-4) - Pharmacological DVT Prophylaxis Ordered: heparin.    Mobility:   Basic Mobility Inpatient Raw Score: 12  JH-HLM Goal: 4: Move to chair/commode  JH-HLM Achieved: 4: Move to chair/commode  JH-HLM Goal NOT achieved. Continue with multidisciplinary rounding and encourage appropriate mobility to improve upon JH-HLM goals.    Patient Centered Rounds: I performed bedside rounds with nursing staff today.   Discussions with Specialists or Other Care  Team Provider: Case Management     Education and Discussions with Family / Patient: Updated  (wife) via phone.    Total Time Spent on Date of Encounter in care of patient: 35 mins. This time was spent on one or more of the following: performing physical exam; counseling and coordination of care; obtaining or reviewing history; documenting in the medical record; reviewing/ordering tests, medications or procedures; communicating with other healthcare professionals and discussing with patient's family/caregivers.    Current Length of Stay: 2 day(s)  Current Patient Status: Inpatient   Certification Statement: The patient will continue to require additional inpatient hospital stay due to confusion; PT evaluation for discharge needs   Discharge Plan: Anticipate discharge in 24-48 hrs to discharge location to be determined pending rehab evaluations.    Code Status: Level 3 - DNAR and DNI    Subjective:   Pt was seen and examined at bedside. Denies any chest pain or shortness of breath.  Denies any abdominal pain.  More alert today however still not back to baseline.        Objective:     Vitals:   Temp (24hrs), Av.9 °F (36.1 °C), Min:96.2 °F (35.7 °C), Max:97.6 °F (36.4 °C)    Temp:  [96.2 °F (35.7 °C)-97.6 °F (36.4 °C)] 96.2 °F (35.7 °C)  HR:  [73-80] 79  Resp:  [15-19] 15  BP: (103-119)/(62-64) 119/64  SpO2:  [96 %-99 %] 96 %  Body mass index is 18.74 kg/m².     Input and Output Summary (last 24 hours):     Intake/Output Summary (Last 24 hours) at 2024 0822  Last data filed at 2024 0000  Gross per 24 hour   Intake 5 ml   Output 777 ml   Net -772 ml       Physical Exam:   General: in no acute distress  HEENT: atraumatic, normocephalic  Skin: no jaundice  CVS: RRR, no murmurs appreciated  Lungs: rhonchi noted bilaterally  Abdomen: soft, nondistended, bowel sounds normal, nontender upon palpation, no guarding or rebound tenderness, drain in place in right side of abdomen   Extremities: venodynes  in place in both legs   Neuro: alert and oriented x3, tremors noted in upper extremities   Psych: calm, cooperative      Additional Data:     Labs:  Results from last 7 days   Lab Units 08/04/24  0530 08/03/24  0543   WBC Thousand/uL 7.53 3.28*   HEMOGLOBIN g/dL 11.3* 10.7*   HEMATOCRIT % 32.8* 30.9*   PLATELETS Thousands/uL 227 224   SEGS PCT %  --  88*   LYMPHO PCT % 6* 9*   MONO PCT % 3* 3*   EOS PCT % 0 0     Results from last 7 days   Lab Units 08/04/24  0530 08/03/24  0543 08/02/24  0909   SODIUM mmol/L 133*   < > 130*   POTASSIUM mmol/L 3.8   < > 4.1   CHLORIDE mmol/L 103   < > 98   CO2 mmol/L 25   < > 26   BUN mg/dL 18   < > 9   CREATININE mg/dL 0.47*   < > 0.52*   ANION GAP mmol/L 5   < > 6   CALCIUM mg/dL 7.6*   < > 8.1*   ALBUMIN g/dL  --   --  2.7*   TOTAL BILIRUBIN mg/dL  --   --  0.68   ALK PHOS U/L  --   --  65   ALT U/L  --   --  12   AST U/L  --   --  36   GLUCOSE RANDOM mg/dL 159*   < > 78    < > = values in this interval not displayed.     Results from last 7 days   Lab Units 08/02/24  0909   INR  1.12             Results from last 7 days   Lab Units 08/02/24  0909   LACTIC ACID mmol/L 1.5   PROCALCITONIN ng/ml 0.10       Lines/Drains:  Invasive Devices       Peripheral Intravenous Line  Duration             Peripheral IV 08/02/24 Right;Ventral (anterior) Forearm 1 day              Drain  Duration             Abscess Drain Abdomen 23 days    Cholecystostomy Tube 23 days                          Imaging: No pertinent imaging reviewed.    Recent Cultures (last 7 days):   Results from last 7 days   Lab Units 08/02/24  0914 08/02/24  0909   BLOOD CULTURE  No Growth at 24 hrs. No Growth at 24 hrs.       Last 24 Hours Medication List:   Current Facility-Administered Medications   Medication Dose Route Frequency Provider Last Rate    carbidopa-levodopa  1 tablet Oral TID Kathy Davison MD      cefTRIAXone  1,000 mg Intravenous Q24H Kathy Davison MD 1,000 mg (08/03/24 1239)    dorzolamide  1 drop Both  Eyes TID Kathy Davison MD      heparin (porcine)  5,000 Units Subcutaneous Q8H SIMON Kathy Davison MD      ipratropium-albuterol  3 mL Nebulization Q6H PRN Kathy Davison MD      magnesium sulfate  2 g Intravenous Once Kathy Davison MD      methylPREDNISolone sodium succinate  60 mg Intravenous Q8H [facility] Kathy Davison MD      pramipexole  0.125 mg Oral TID Kathy Davison MD      tamsulosin  0.4 mg Oral Daily With Dinner Kathy Davison MD          Today, Patient Was Seen By: Kathy Davison MD    **Please Note: This note may have been constructed using a voice recognition system.**

## 2024-08-04 NOTE — PLAN OF CARE
Problem: Potential for Falls  Goal: Patient will remain free of falls  Description: INTERVENTIONS:  - Educate patient/family on patient safety including physical limitations  - Instruct patient to call for assistance with activity   - Consult OT/PT to assist with strengthening/mobility   - Keep Call bell within reach  - Keep bed low and locked with side rails adjusted as appropriate  - Keep care items and personal belongings within reach  - Initiate and maintain comfort rounds  - Make Fall Risk Sign visible to staff  - Offer Toileting every 2 Hours, in advance of need  - Initiate/Maintain Bed/Chair alarm  - Obtain necessary fall risk management equipment: walker  - Apply yellow socks and bracelet for high fall risk patients  - Consider moving patient to room near nurses station  Outcome: Progressing     Problem: RESPIRATORY - ADULT  Goal: Achieves optimal ventilation and oxygenation  Description: INTERVENTIONS:  - Assess for changes in respiratory status  - Assess for changes in mentation and behavior  - Position to facilitate oxygenation and minimize respiratory effort  - Oxygen administered by appropriate delivery if ordered  - Initiate smoking cessation education as indicated  - Encourage broncho-pulmonary hygiene including cough, deep breathe, Incentive Spirometry  - Assess the need for suctioning and aspirate as needed  - Assess and instruct to report SOB or any respiratory difficulty  - Respiratory Therapy support as indicated  Outcome: Progressing     Problem: Prexisting or High Potential for Compromised Skin Integrity  Goal: Skin integrity is maintained or improved  Description: INTERVENTIONS:  - Identify patients at risk for skin breakdown  - Assess and monitor skin integrity  - Assess and monitor nutrition and hydration status  - Monitor labs   - Assess for incontinence   - Turn and reposition patient  - Assist with mobility/ambulation  - Relieve pressure over bony prominences  - Avoid friction and  shearing  - Provide appropriate hygiene as needed including keeping skin clean and dry  - Evaluate need for skin moisturizer/barrier cream  - Collaborate with interdisciplinary team   - Patient/family teaching  - Consider wound care consult   Outcome: Progressing     Problem: Nutrition/Hydration-ADULT  Goal: Nutrient/Hydration intake appropriate for improving, restoring or maintaining nutritional needs  Description: Monitor and assess patient's nutrition/hydration status for malnutrition. Collaborate with interdisciplinary team and initiate plan and interventions as ordered.  Monitor patient's weight and dietary intake as ordered or per policy. Utilize nutrition screening tool and intervene as necessary. Determine patient's food preferences and provide high-protein, high-caloric foods as appropriate.     INTERVENTIONS:  - Monitor oral intake, urinary output, labs, and treatment plans  - Assess nutrition and hydration status and recommend course of action  - Evaluate amount of meals eaten  - Assist patient with eating if necessary   - Allow adequate time for meals  - Recommend/ encourage appropriate diets, oral nutritional supplements, and vitamin/mineral supplements  - Order, calculate, and assess calorie counts as needed  - Recommend, monitor, and adjust tube feedings and TPN/PPN based on assessed needs  - Assess need for intravenous fluids  - Provide specific nutrition/hydration education as appropriate  - Include patient/family/caregiver in decisions related to nutrition  Outcome: Progressing

## 2024-08-04 NOTE — ASSESSMENT & PLAN NOTE
Likely related to his Parkinson's Disease. CT head is negative for acute process.  Continue fall precautions.  PT/OT evaluation for discharge needs

## 2024-08-04 NOTE — ASSESSMENT & PLAN NOTE
Differential includes infection related (UTI and recent COVID) vs metabolic derangement.  CT head is negative for acute process. Will follow clinically

## 2024-08-05 PROBLEM — E87.1 HYPONATREMIA: Status: RESOLVED | Noted: 2024-08-02 | Resolved: 2024-08-05

## 2024-08-05 LAB
ANION GAP SERPL CALCULATED.3IONS-SCNC: 5 MMOL/L (ref 4–13)
BUN SERPL-MCNC: 24 MG/DL (ref 5–25)
CALCIUM SERPL-MCNC: 7.8 MG/DL (ref 8.4–10.2)
CHLORIDE SERPL-SCNC: 105 MMOL/L (ref 96–108)
CO2 SERPL-SCNC: 27 MMOL/L (ref 21–32)
CREAT SERPL-MCNC: 0.5 MG/DL (ref 0.6–1.3)
ERYTHROCYTE [DISTWIDTH] IN BLOOD BY AUTOMATED COUNT: 12.9 % (ref 11.6–15.1)
GFR SERPL CREATININE-BSD FRML MDRD: 94 ML/MIN/1.73SQ M
GLUCOSE SERPL-MCNC: 159 MG/DL (ref 65–140)
HCT VFR BLD AUTO: 32.7 % (ref 36.5–49.3)
HGB BLD-MCNC: 11.1 G/DL (ref 12–17)
MAGNESIUM SERPL-MCNC: 2.1 MG/DL (ref 1.9–2.7)
MCH RBC QN AUTO: 32.7 PG (ref 26.8–34.3)
MCHC RBC AUTO-ENTMCNC: 33.9 G/DL (ref 31.4–37.4)
MCV RBC AUTO: 97 FL (ref 82–98)
NRBC BLD AUTO-RTO: 0 /100 WBCS
PLATELET # BLD AUTO: 229 THOUSANDS/UL (ref 149–390)
PMV BLD AUTO: 9.4 FL (ref 8.9–12.7)
POTASSIUM SERPL-SCNC: 4 MMOL/L (ref 3.5–5.3)
RBC # BLD AUTO: 3.39 MILLION/UL (ref 3.88–5.62)
SODIUM SERPL-SCNC: 137 MMOL/L (ref 135–147)
WBC # BLD AUTO: 8.47 THOUSAND/UL (ref 4.31–10.16)

## 2024-08-05 PROCEDURE — 94760 N-INVAS EAR/PLS OXIMETRY 1: CPT

## 2024-08-05 PROCEDURE — 99232 SBSQ HOSP IP/OBS MODERATE 35: CPT | Performed by: STUDENT IN AN ORGANIZED HEALTH CARE EDUCATION/TRAINING PROGRAM

## 2024-08-05 PROCEDURE — 85027 COMPLETE CBC AUTOMATED: CPT | Performed by: STUDENT IN AN ORGANIZED HEALTH CARE EDUCATION/TRAINING PROGRAM

## 2024-08-05 PROCEDURE — 97163 PT EVAL HIGH COMPLEX 45 MIN: CPT

## 2024-08-05 PROCEDURE — 80048 BASIC METABOLIC PNL TOTAL CA: CPT | Performed by: STUDENT IN AN ORGANIZED HEALTH CARE EDUCATION/TRAINING PROGRAM

## 2024-08-05 PROCEDURE — 99222 1ST HOSP IP/OBS MODERATE 55: CPT | Performed by: SURGERY

## 2024-08-05 PROCEDURE — 97535 SELF CARE MNGMENT TRAINING: CPT

## 2024-08-05 PROCEDURE — 97110 THERAPEUTIC EXERCISES: CPT

## 2024-08-05 PROCEDURE — 83735 ASSAY OF MAGNESIUM: CPT | Performed by: STUDENT IN AN ORGANIZED HEALTH CARE EDUCATION/TRAINING PROGRAM

## 2024-08-05 RX ADMIN — HEPARIN SODIUM 5000 UNITS: 5000 INJECTION INTRAVENOUS; SUBCUTANEOUS at 15:21

## 2024-08-05 RX ADMIN — DORZOLAMIDE HYDROCHLORIDE 1 DROP: 20 SOLUTION OPHTHALMIC at 15:34

## 2024-08-05 RX ADMIN — CEFTRIAXONE 1000 MG: 1 INJECTION, SOLUTION INTRAVENOUS at 12:58

## 2024-08-05 RX ADMIN — CARBIDOPA AND LEVODOPA 1 TABLET: 25; 100 TABLET ORAL at 21:46

## 2024-08-05 RX ADMIN — PRAMIPEXOLE DIHYDROCHLORIDE 0.12 MG: 0.25 TABLET ORAL at 09:27

## 2024-08-05 RX ADMIN — METHYLPREDNISOLONE SODIUM SUCCINATE 60 MG: 125 INJECTION, POWDER, FOR SOLUTION INTRAMUSCULAR; INTRAVENOUS at 21:46

## 2024-08-05 RX ADMIN — DORZOLAMIDE HYDROCHLORIDE 1 DROP: 20 SOLUTION OPHTHALMIC at 09:27

## 2024-08-05 RX ADMIN — CARBIDOPA AND LEVODOPA 1 TABLET: 25; 100 TABLET ORAL at 15:20

## 2024-08-05 RX ADMIN — METHYLPREDNISOLONE SODIUM SUCCINATE 60 MG: 125 INJECTION, POWDER, FOR SOLUTION INTRAMUSCULAR; INTRAVENOUS at 05:04

## 2024-08-05 RX ADMIN — HEPARIN SODIUM 5000 UNITS: 5000 INJECTION INTRAVENOUS; SUBCUTANEOUS at 05:04

## 2024-08-05 RX ADMIN — DORZOLAMIDE HYDROCHLORIDE 1 DROP: 20 SOLUTION OPHTHALMIC at 21:46

## 2024-08-05 RX ADMIN — PRAMIPEXOLE DIHYDROCHLORIDE 0.12 MG: 0.25 TABLET ORAL at 15:20

## 2024-08-05 RX ADMIN — METHYLPREDNISOLONE SODIUM SUCCINATE 60 MG: 125 INJECTION, POWDER, FOR SOLUTION INTRAMUSCULAR; INTRAVENOUS at 15:21

## 2024-08-05 RX ADMIN — HEPARIN SODIUM 5000 UNITS: 5000 INJECTION INTRAVENOUS; SUBCUTANEOUS at 21:46

## 2024-08-05 RX ADMIN — TAMSULOSIN HYDROCHLORIDE 0.4 MG: 0.4 CAPSULE ORAL at 15:30

## 2024-08-05 RX ADMIN — PRAMIPEXOLE DIHYDROCHLORIDE 0.12 MG: 0.25 TABLET ORAL at 21:46

## 2024-08-05 RX ADMIN — CARBIDOPA AND LEVODOPA 1 TABLET: 25; 100 TABLET ORAL at 09:27

## 2024-08-05 NOTE — CASE MANAGEMENT
[facility] received request for authorization from Care Manager.  Authorization request submitted for: [facility]  Facility Name: [facility] NPI: [id number]  Facility MD:  Dr. Matias NPI: [id number]  Authorization initiated by contacting insurance:  Shana  Via: [employer]   Clinicals submitted via [employer] attachment   Pending Reference #: [id number]    Care Manager notified: Argentina Beltran    Updates to authorization status will be noted in chart. Please reach out to CM for updates on any clinical information.

## 2024-08-05 NOTE — PLAN OF CARE
Problem: OCCUPATIONAL THERAPY ADULT  Goal: Performs self-care activities at highest level of function for planned discharge setting.  See evaluation for individualized goals.  Description: Treatment Interventions: ADL retraining, Functional transfer training, UE strengthening/ROM, Endurance training, Patient/family training, Equipment evaluation/education, Activityengagement, Compensatory technique education          See flowsheet documentation for full assessment, interventions and recommendations.   Outcome: Progressing  Note: Limitation: Decreased ADL status, Decreased UE strength, Decreased Safe judgement during ADL, Decreased endurance, Decreased self-care trans, Decreased high-level ADLs, Decreased cognition, Decreased UE ROM (decreased balance and mobility)  Prognosis: Good  Assessment: Pt seen for skilled OT tx session focusing on activity engagement, challenging activity tolerance. Pt agreeable to participate and demonstrated improved activity tolerance. Pt required less physical assistance to complete sit <> stand and short distance functional mobility using RW. Pt demonstrated limited AROM L shoulder AG impacting pt's I w/ ADLs. Continue to recommend level II rehab resource intensity when medically stable for discharge from acute care. Will continue to follow     Rehab Resource Intensity Level, OT: II (Moderate Resource Intensity)

## 2024-08-05 NOTE — ASSESSMENT & PLAN NOTE
Occurred during prior admission.  Drain in place. Continue local care.  Family report he has an appt with IR for drain check this week.  Will discuss with IR team today to see if they can evaluate him while inpatient.

## 2024-08-05 NOTE — CONSULTS
e-Consult (IPC)  - Interventional Radiology  Edwin Elkins 89 y.o. male MRN: [id number]  Unit/Bed#: [address] [place] [address] Encounter: [id number]          Interventional Radiology has been consulted to evaluate Edwin Elkins    We were consulted by OLGA concerning this patient with cholecystostomy and perihepatic drainage catheters.    Inpatient Consult to IR  Consult performed by: Héctor Call MD  Consult ordered by: Kathy Davison MD        08/05/24    Assessment/Recommendation:   89 yo male with Parkinsons, COVID + (7/27/2024) s/p course of Paxlovid, with recent admission 7/11 for acute cholecystitis with GB perforation and perihepatic bile leak, s/p perc rosalba (4+ E Coli) and perihepatic drain placement, now admitted with multiple recent falls, presumed to be related to his PD. Patient had perihepatic drain check on 7/26 demonstrating persistent fluid/bile, with f/u drain check appt on 8/8. Patient and wife asking about having drain reevaluated while he is currently in hospital. WBC 8.5. Afebrile.    Unfortunately, there do not seem to be any records of drainage output from his tubes, and such data was requested during his last visit on 7/26 to be recorded by nursing home. Such data would be necessary to help evaluate his candidacy for removal of the perihepatic drain (e.g., if drainage is < 10-15 ml/24 hrs). CT would also potentially be useful to see if there is any residual perihepatic fluid. The cholecystostomy tube will need to remain in place at least for now, particularly given his known GB perforation, and possibly indefinitely depending on any potential surgical plan. Patient has an appt with Dr. Ledbetter on 8/7 (possibly will be seen during this admission).    Would recommend recording output from both drainage catheters for now. Depending on output, can consider performing f/u drain check with possible removal if output is < 10-15 ml/24 hr. F/u CT might be of benefit as well. Surgical input pending. D/w  Dr. Davison.     21-30 minutes, >50% of the total time devoted to medical consultative verbal/EMR discussion between providers. Written report will be generated in the EMR.     Thank you for allowing Interventional Radiology to participate in the care of Edwin Elkins. Please don't hesitate to call or TigerText us with any questions.     Héctor Call MD

## 2024-08-05 NOTE — ASSESSMENT & PLAN NOTE
Pt recently completed therapy for COVID infection with Paxlovid.  CXR with no obvious infiltrate noted.  Continues to dyspnea with exertion and required O2.  Will resume IV Solumedrol and nebs and plan to wean from O2 as tolerated.  Will check ambulatory sats today

## 2024-08-05 NOTE — CASE MANAGEMENT
Case Management Assessment & Discharge Planning Note    Patient name Edwin Elkins  Location [address] [address]/[address] [place] [address]-* MRN [id number]  : 1932 Date 2024       Current Admission Date: 2024  Current Admission Diagnosis:Frequent falls   Patient Active Problem List    Diagnosis Date Noted Date Diagnosed    Hypomagnesemia 2024     Frequent falls 2024     Acute hypoxic respiratory failure (HCC) 2024     Acute cystitis 2024     Acute metabolic encephalopathy 2024     Perforated gallbladder s/p IR drain placement 2024     Acute cholecystitis due to biliary calculus 2024     Extrahepatic biloma 2024     Parkinson's disease with dyskinesia 2024     Moderate protein-calorie malnutrition (HCC) 2024     Venous insufficiency of both lower extremities 2022     Chronic deep vein thrombosis, RIGHT LE (DVT) (HCC) 2022       LOS (days): 3  Geometric Mean LOS (GMLOS) (days): 3.9  Days to GMLOS:0.8     OBJECTIVE:  PATIENT READMITTED TO HOSPITAL  Risk of Unplanned Readmission Score: 15.51       Current admission status: Inpatient  Referral Reason: Other (Discharge planning)    Preferred Pharmacy:   [facility] [address] [place] [address] - [address] [place] [address] [address] [address]  Phone: [phone] Fax: [phone]    Primary Care Provider: Nithin Greenberg MD    Primary Insurance: [facility]  Secondary Insurance:     ASSESSMENT:  Active Health Care Proxies    There are no active Health Care Proxies on file.        Readmission Root Cause  30 Day Readmission: Yes  Who directed you to return to the hospital?: Other (comment) (Facility - [facility])  Did you understand whom to contact if you had questions or problems?: Yes  Did you get your prescriptions before you left the hospital?: No  Reason:: Not preferred pharmacy  Were you able to get your prescriptions filled when you left the  hospital?: Yes  Did you take your medications as prescribed?: Yes  Were you able to get to your follow-up appointments?: No  Reason:: Readmitted prior to appointment  During previous admission, was a post-acute recommendation made?: Yes  What post-acute resources were offered?: [facility]  Patient was readmitted due to: Frequent falls, acute respiratory failure, acute metabolic encephalopathy  Action Plan: Medical management, return to [facility] at [facility] when cleared    Patient Information  Admitted from:: Facility ([facility])  Mental Status: Alert, Confused  During Assessment patient was accompanied by: Not accompanied during assessment  Assessment information provided by:: Spouse  Primary Caregiver: Spouse  Caregiver's Name:: Rachele Elkins (spouse)  Caregiver's Relationship to Patient:: Family Member  Caregiver's Telephone Number:: [phone]  Support Systems: Spouse/significant other, Family members  County of Residence: [place]  What city do you live in?: [place]  Home entry access options. Select all that apply.: No steps to enter home, Garage  Type of Current Residence: 2 story home  Upon entering residence, is there a bedroom on the main floor (no further steps)?: No  A bedroom is located on the following floor levels of residence (select all that apply):: 2nd Floor  Upon entering residence, is there a bathroom on the main floor (no further steps)?: No  Indicate which floors of current residence have a bathroom (select all the apply):: 2nd Floor  Number of steps to 2nd floor from main floor: One Flight  Living Arrangements: Lives w/ Spouse/significant other    Activities of Daily Living Prior to Admission  Functional Status: Assistance  Completes ADLs independently?: No  Level of ADL dependence: Assistance  Ambulates independently?: No  Level of ambulatory dependence: Assistance  Does patient use assisted devices?: Yes  Assisted Devices (DME) used: Walker, Rollator, Straight Cane  Does patient currently  own DME?: Yes  What DME does the patient currently own?: Rollator, Straight Cane, Walker  Does the patient have a history of Short-Term Rehab?: Yes ([facility])  Does patient currently have HHC?: No    Patient Information Continued  Income Source: Pension/USP  Does patient have prescription coverage?: Yes  Does patient receive dialysis treatments?: No  Does patient have a history of substance abuse?: No  Does patient have a history of Mental Health Diagnosis?: No    Means of Transportation  Means of Transport to Appts:: Family transport      Social Determinants of Health (SDOH)      Flowsheet Row Most Recent Value   Housing Stability    In the last 12 months, was there a time when you were not able to pay the mortgage or rent on time? N   In the past 12 months, how many times have you moved where you were living? 0   At any time in the past 12 months, were you homeless or living in a shelter (including now)? N   Transportation Needs    In the past 12 months, has lack of transportation kept you from medical appointments or from getting medications? no   In the past 12 months, has lack of transportation kept you from meetings, work, or from getting things needed for daily living? No   Food Insecurity    Within the past 12 months, you worried that your food would run out before you got the money to buy more. Never true   Within the past 12 months, the food you bought just didn't last and you didn't have money to get more. Never true   Utilities    In the past 12 months has the electric, gas, oil, or water company threatened to shut off services in your home? No            DISCHARGE DETAILS:    Discharge planning discussed with:: Wife Rachele  Freedom of Choice: Yes    Comments - Freedom of Choice: ASTRID spoke with wife Rachele by phone to review discharge planning.  Rachele's preference is for patient to return to [facility] when medically cleared.   ASTRID placed referral in AIDIN and  patient has been accepted for return.  Insurance authorization request has been submitted to the CM DSC.  SW will continue to follow.      CM contacted family/caregiver?: Yes  Were Treatment Team discharge recommendations reviewed with patient/caregiver?: Yes  Did patient/caregiver verbalize understanding of patient care needs?: Yes  Were patient/caregiver advised of the risks associated with not following Treatment Team discharge recommendations?: Yes    Contacts  Patient Contacts: Rachele Elkins (spouse)  Relationship to Patient:: Family  Contact Method: Phone  Phone Number: [phone]  Reason/Outcome: Emergency Contact, Referral, Discharge Planning    Requested Home Health Care         Is the patient interested in HHC at discharge?: No    DME Referral Provided  Referral made for DME?: No    Other Referral/Resources/Interventions Provided:  Interventions: Short Term Rehab    Would you like to participate in our [facility] service program?  : No - Declined    Treatment Team Recommendation: Short Term Rehab  Discharge Destination Plan:: Short Term Rehab  Transport at Discharge : Wheelchair van

## 2024-08-05 NOTE — PROGRESS NOTES
Patient:  CAMRYN PEÑA    MRN:  [id number]    Aidin Request ID:  [id number]    Level of care reserved:  Skilled Nursing Facility    Partner Reserved:  [facility] [place] [facility] [phone]    Clinical needs requested:    Geography searched:  10 miles around [id number]    Start of Service:    Request sent:  12:20pm EDT on 8/5/2024 by Argentina Beltran    Partner reserved:  12:28pm EDT on 8/5/2024 by Argentina Beltran    Choice list shared:  12:28pm EDT on 8/5/2024 by Argentina Beltran

## 2024-08-05 NOTE — PROGRESS NOTES
[facility]  Progress Note  Name: Edwin Elkins I  MRN: [id number]  Unit/Bed#: [address] I Date of Admission: 8/2/2024   Date of Service: 8/5/2024 I Hospital Day: 3    Assessment & Plan   Acute metabolic encephalopathy  Assessment & Plan  Differential includes infection related (UTI and recent COVID) vs metabolic derangement.  CT head is negative for acute process.  Will follow clinically     Acute cystitis  Assessment & Plan  Resume IV Ceftriaxone    Acute hypoxic respiratory failure (HCC)  Assessment & Plan  Pt recently completed therapy for COVID infection with Paxlovid.  CXR with no obvious infiltrate noted.  Continues to have dyspnea with exertion. Will resume IV Solumedrol and nebs and plan to wean from O2 as tolerated.     Frequent falls  Assessment & Plan  Likely related to his Parkinson's Disease. CT head is negative for acute process.  Continue fall precautions.  PT/OT evaluation for discharge needs     Parkinson's disease with dyskinesia  Assessment & Plan  Resume Sinemet and Pramipexole.  Continue fall precautions. PT/OT evaluation for discharge needs     Perforated gallbladder s/p IR drain placement  Assessment & Plan  Occurred during prior admission.  Pt has 2 drains in place.  Discussed case with IR who recommend monitoring drainage outpt for 24 hours.  Based on outpt they will consider performing follow up drain check with possible removal if oupt is < 10-15 ml/24hr.  Can also consider repeat CT Abd/Pelvis for re-evaluation.  Surgery team consulted as well           Hyponatremia-resolved as of 8/5/2024  Assessment & Plan  Possibly due to dehydration from poor po intake.  Now resolved              VTE Pharmacologic Prophylaxis:    Heparin     Mobility:   Basic Mobility Inpatient Raw Score: 12  JH-HLM Goal: 4: Move to chair/commode  JH-HLM Achieved: 4: Move to chair/commode  JH-HLM Goal NOT achieved. Continue with multidisciplinary rounding and encourage appropriate  mobility to improve upon [facility] goals.    Patient Centered Rounds: I performed bedside rounds with nursing staff today.   Discussions with Specialists or Other Care Team Provider: Case Management     Education and Discussions with Family / Patient: Updated  (wife) via phone.    Total Time Spent on Date of Encounter in care of patient: 35 mins. This time was spent on one or more of the following: performing physical exam; counseling and coordination of care; obtaining or reviewing history; documenting in the medical record; reviewing/ordering tests, medications or procedures; communicating with other healthcare professionals and discussing with patient's family/caregivers.    Current Length of Stay: 3 day(s)  Current Patient Status: Inpatient   Certification Statement: The patient will continue to require additional inpatient hospital stay due to resp failure, PT evaluation for discharge    Discharge Plan: Anticipate discharge in 24-48 hrs to discharge location to be determined pending rehab evaluations.    Code Status: Level 3 - DNAR and DNI    Subjective:   Pt was seen and examined at bedside.  Mental status continues to wax and wane.  Pt also has fatigue and SOB with exertion.  Denies any chest pain at rest.       Objective:     Vitals:   Temp (24hrs), Av °F (36.1 °C), Min:96.8 °F (36 °C), Max:97.1 °F (36.2 °C)    Temp:  [96.8 °F (36 °C)-97.1 °F (36.2 °C)] 96.9 °F (36.1 °C)  HR:  [71-79] 71  Resp:  [18-20] 20  BP: (114-127)/(61-72) 116/66  SpO2:  [96 %-99 %] 96 %  Body mass index is 18.67 kg/m².     Input and Output Summary (last 24 hours):     Intake/Output Summary (Last 24 hours) at 2024 0874  Last data filed at 2024 0511  Gross per 24 hour   Intake 90 ml   Output 500 ml   Net -410 ml       Physical Exam:   General: in no acute distress  HEENT: atraumatic, normocephalic  Skin: no jaundice  CVS: RRR, no murmurs appreciated  Lungs: CTAL, no wheezing or rales appreciated  Abdomen: soft,  nondistended, bowel sounds normal, nontender upon palpation, no guarding or rebound tenderness, drain in place with greenish fluid noted   Extremities: venodynes in place in both legs   Neuro: alert to person and place, tremors noted in upper extremities   Psych: anxious      Additional Data:     Labs:  Results from last 7 days   Lab Units 08/05/24  0513 08/04/24  0530 08/03/24  0543   WBC Thousand/uL 8.47 7.53 3.28*   HEMOGLOBIN g/dL 11.1* 11.3* 10.7*   HEMATOCRIT % 32.7* 32.8* 30.9*   PLATELETS Thousands/uL 229 227 224   SEGS PCT %  --   --  88*   LYMPHO PCT %  --  6* 9*   MONO PCT %  --  3* 3*   EOS PCT %  --  0 0     Results from last 7 days   Lab Units 08/05/24  0513 08/03/24  0543 08/02/24  0909   SODIUM mmol/L 137   < > 130*   POTASSIUM mmol/L 4.0   < > 4.1   CHLORIDE mmol/L 105   < > 98   CO2 mmol/L 27   < > 26   BUN mg/dL 24   < > 9   CREATININE mg/dL 0.50*   < > 0.52*   ANION GAP mmol/L 5   < > 6   CALCIUM mg/dL 7.8*   < > 8.1*   ALBUMIN g/dL  --   --  2.7*   TOTAL BILIRUBIN mg/dL  --   --  0.68   ALK PHOS U/L  --   --  65   ALT U/L  --   --  12   AST U/L  --   --  36   GLUCOSE RANDOM mg/dL 159*   < > 78    < > = values in this interval not displayed.     Results from last 7 days   Lab Units 08/02/24  0909   INR  1.12             Results from last 7 days   Lab Units 08/02/24  0909   LACTIC ACID mmol/L 1.5   PROCALCITONIN ng/ml 0.10       Lines/Drains:  Invasive Devices       Peripheral Intravenous Line  Duration             Peripheral IV 08/02/24 Left;Ventral (anterior) Forearm 3 days    Peripheral IV 08/02/24 Right;Ventral (anterior) Forearm 2 days              Drain  Duration             Abscess Drain Abdomen 24 days    Cholecystostomy Tube 24 days    External Urinary Catheter <1 day    External Urinary Catheter Medium <1 day                          Imaging: No pertinent imaging reviewed.    Recent Cultures (last 7 days):   Results from last 7 days   Lab Units 08/02/24  0914 08/02/24  0909   BLOOD  CULTURE  No Growth at 48 hrs. No Growth at 48 hrs.       Last 24 Hours Medication List:   Current Facility-Administered Medications   Medication Dose Route Frequency Provider Last Rate    carbidopa-levodopa  1 tablet Oral TID Kathy Davison MD      cefTRIAXone  1,000 mg Intravenous Q24H Kathy Davison MD 1,000 mg (08/04/24 1259)    dorzolamide  1 drop Both Eyes TID aKthy Davison MD      heparin (porcine)  5,000 Units Subcutaneous Q8H SIMON Kathy Davison MD      ipratropium-albuterol  3 mL Nebulization Q6H PRN Kathy Davison MD      methylPREDNISolone sodium succinate  60 mg Intravenous Q8H SIMON Kathy Davison MD      pramipexole  0.125 mg Oral TID Kathy Davison MD      tamsulosin  0.4 mg Oral Daily With Dinner Kathy Davison MD          Today, Patient Was Seen By: Kathy Davison MD    **Please Note: This note may have been constructed using a voice recognition system.**

## 2024-08-05 NOTE — CONSULTS
Consultation - General Surgery   Edwin Elkins 89 y.o. male MRN: [id number]  Unit/Bed#: [address] [place] [address] Encounter: [id number]    Assessment & Plan     Assessment:  89-year-old male with past medical history of Parkinson's disease, covid+, hx of perforated cholecystitis s/p cholecystostomy tube and perhepatic drain placement 07/11 presenting with confusion and fall. Sepsis secondary to UTI.     Plan:  Obtain CT a/p IV contrast evaluate perihepatic abscess.   Possible discontinuation of perihepatic drain pending CT scan.   Continue cholecystostomy tube.    History of Present Illness     HPI:  Edwin Elkins is a 89 y.o. male with past medical history of Parkinson's disease, covid+, hx of perforated cholecystitis s/p cholecystostomy tube and perhepatic drain placement 07/11 presenting with confusion and fall. Sepsis secondary to UTI. Surgery consulted for perihepatic drain and cholecystostomy tube.     Inpatient consult to Acute Care Surgery  Consult performed by: Yogesh Smith PA-C  Consult ordered by: Kathy Davison MD          Review of Systems   Unable to perform ROS: Mental status change       Historical Information   Past Medical History:   Diagnosis Date    Arthritis     Disease of thyroid gland     Glaucoma     Parkinson disease     Pressure injury of skin     Psychiatric disorder     Renal disorder      Past Surgical History:   Procedure Laterality Date    ABDOMINAL SURGERY      EYE SURGERY      GASTRECTOMY      IR CHOLECYSTOSTOMY TUBE PLACEMENT  07/11/2024    IR DRAINAGE TUBE CHECK/CHANGE/REPOSITION/REINSERTION/UPSIZE  07/26/2024     Social History   Social History     Substance and Sexual Activity   Alcohol Use Not Currently     Social History     Substance and Sexual Activity   Drug Use Not Currently    Types: Other    Comment: Pt denies drug use     E-Cigarette/Vaping    E-Cigarette Use Never User      E-Cigarette/Vaping Substances    Nicotine No     THC No     CBD No     Flavoring No     Other No      Unknown No      Social History     Tobacco Use   Smoking Status Former    Current packs/day: 0.50    Average packs/day: 0.5 packs/day for 73.6 years (36.8 ttl pk-yrs)    Types: Cigarettes    Start date: 1951   Smokeless Tobacco Never     Family History: non-contributory    Meds/Allergies   all current active meds have been reviewed  No Known Allergies    Objective   First Vitals:   Blood Pressure: 131/69 (08/02/24 0835)  Pulse: 78 (08/02/24 0835)  Temperature: (!) 96.5 °F (35.8 °C) (08/02/24 0835)  Temp Source: Rectal (08/02/24 0835)  Respirations: (!) 26 (08/02/24 0835)  Weight - Scale: 58.1 kg (128 lb) (08/02/24 1130)  SpO2: 94 % (08/02/24 0835)    Current Vitals:   Blood Pressure: 116/66 (08/05/24 0755)  Pulse: 76 (08/05/24 1327)  Temperature: (!) 96.9 °F (36.1 °C) (08/05/24 0755)  Temp Source: Temporal (08/05/24 0755)  Respirations: 20 (08/05/24 1327)  Weight - Scale: 59 kg (130 lb 1.6 oz) (08/05/24 0600)  SpO2: 94 % (08/05/24 1327)      Intake/Output Summary (Last 24 hours) at 8/5/2024 1828  Last data filed at 8/5/2024 1701  Gross per 24 hour   Intake 500 ml   Output 620 ml   Net -120 ml       Invasive Devices       Peripheral Intravenous Line  Duration             Peripheral IV 08/02/24 Left;Ventral (anterior) Forearm 3 days    Peripheral IV 08/02/24 Right;Ventral (anterior) Forearm 3 days              Drain  Duration             Abscess Drain Abdomen 25 days    Cholecystostomy Tube 25 days    External Urinary Catheter Medium 1 day    External Urinary Catheter <1 day                    Physical Exam  Vitals and nursing note reviewed.   HENT:      Head: Normocephalic and atraumatic.      Mouth/Throat:      Mouth: Mucous membranes are moist.   Eyes:      Extraocular Movements: Extraocular movements intact.   Cardiovascular:      Rate and Rhythm: Normal rate.   Pulmonary:      Effort: Pulmonary effort is normal. No respiratory distress.   Abdominal:      General: There is no distension.      Palpations: Abdomen  is soft.      Tenderness: There is no abdominal tenderness.      Comments: Drains intact.    Skin:     General: Skin is warm.      Capillary Refill: Capillary refill takes less than 2 seconds.   Neurological:      Mental Status: He is alert.         Lab Results: I have personally reviewed pertinent lab results.  , CBC:   Lab Results   Component Value Date    WBC 7.11 08/06/2024    HGB 11.1 (L) 08/06/2024    HCT 33.0 (L) 08/06/2024    MCV 97 08/06/2024     08/06/2024    RBC 3.41 (L) 08/06/2024    MCH 32.6 08/06/2024    MCHC 33.6 08/06/2024    RDW 13.0 08/06/2024    MPV 9.4 08/06/2024   , CMP:   Lab Results   Component Value Date    SODIUM 137 08/06/2024    K 4.4 08/06/2024     08/06/2024    CO2 27 08/06/2024    BUN 27 (H) 08/06/2024    CREATININE 0.56 (L) 08/06/2024    CALCIUM 7.3 (L) 08/06/2024    EGFR 90 08/06/2024     Imaging: I have personally reviewed pertinent reports.    CT head wo contrast   Final Result by Ray Mitchell MD (08/03 0829)      No acute intracranial abnormality.                  Workstation performed: [id number]         XR chest 1 view portable   Final Result by Chauncey Canseco MD (08/02 1058)      No acute cardiopulmonary disease.            Workstation performed: [id number]         CT head without contrast   Final Result by Antonio Juarez MD (08/02 1003)      No acute intracranial abnormality.                  Workstation performed: [id number]         CT cervical spine without contrast   Final Result by Antonio Juarez MD (08/02 1011)      No cervical spine fracture or traumatic malalignment.                  Workstation performed: [id number]         CT abdomen pelvis w contrast    (Results Pending)       EKG, Pathology, and Other Studies: I have personally reviewed pertinent reports.      Counseling / Coordination of Care  Total floor / unit time spent today 30 minutes.  Greater than 50% of total time was spent with the patient and / or family  counseling and / or coordination of care.  A description of the counseling / coordination of care: obtaining history, performing physical exam, reviewing pertinent labs imaging, discussing case with attending.    .

## 2024-08-05 NOTE — OCCUPATIONAL THERAPY NOTE
Occupational Therapy Progress Note     Patient Name: Edwin Elkins  Today's Date: 8/5/2024  Problem List  Active Problems:    Perforated gallbladder s/p IR drain placement    Parkinson's disease with dyskinesia    Frequent falls    Acute hypoxic respiratory failure (HCC)    Acute cystitis    Acute metabolic encephalopathy    Hypomagnesemia          08/05/24 1249   OT Last Visit   OT Visit Date 08/05/24  (Monday)   Note Type   Note Type Treatment  (tx session 2710-6118)   Pain Assessment   Pain Assessment Tool 0-10   Pain Score No Pain   Restrictions/Precautions   Weight Bearing Precautions Per Order No   Other Precautions Contact/isolation;Airborne/isolation;Droplet precautions;Cognitive;Chair Alarm;Bed Alarm;Multiple lines;Fall Risk;Pain  (COVID +; cholecystectomy drain)   Lifestyle   Autonomy Pt reports use of RW at baseline   Reciprocal Relationships Pt reports living w/ supportive wife   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   Intrinsic Gratification Pt enjoying the [employer] on tv upon therapist arrival   ADL   Where Assessed Chair   Eating Assistance 3  Moderate Assistance   Eating Deficit Setup;Supervision/safety;Scoop assist;Bringing food to mouth assist;Thickened liquids  (mehcanical soft, nectar thick liquids)   Eating Comments pt required assistance to feed lunch   Grooming Assistance 4  Minimal Assistance   Grooming Deficit Setup;Verbal cueing;Supervision/safety;Increased time to complete   Grooming Comments seated OOB in chair w/ + time after set- up   UB Dressing Assistance 3  Moderate Assistance   UB Dressing Deficit Thread LUE;Pull around back;Fasteners   UB Dressing Comments seated w/ + time and assistance due to limited L UE AROM at shoulder   Bed Mobility   Supine to Sit Unable to assess   Sit to Supine Unable to assess   Additional Comments Pt seated OOB in chair upon arrival and at end of session w/ needs met, call bell in reach and chair alarm activated  "  Transfers   Sit to Stand 3  Moderate assistance   Additional items Assist x 1;Armrests;Increased time required;Verbal cues   Stand to Sit 3  Moderate assistance   Additional items Assist x 1;Increased time required;Armrests;Verbal cues  (hand placement and controlled decent)   Additional Comments Pt performed sit <> stand 2X   Functional Mobility   Functional Mobility 3  Moderate assistance   Additional Comments engaged in short distance functional mobility using RW w/ mod A   Additional items Rolling walker   Subjective   Subjective \"I don't understand this tv\"   Cognition   Overall Cognitive Status Impaired   Arousal/Participation Alert;Cooperative   Attention Attends with cues to redirect   Orientation Level Oriented to person;Oriented to place   Memory Decreased recall of recent events   Following Commands Follows one step commands with increased time or repetition   Comments Identified pt by full name and birthdate   Activity Tolerance   Activity Tolerance Patient limited by fatigue   Medical Staff Made Aware spoke w/ RNAyah and PTIvana. Spoke w/ Rosibel BARKER   Assessment   Assessment Pt seen for skilled OT tx session focusing on activity engagement, challenging activity tolerance. Pt agreeable to participate and demonstrated improved activity tolerance. Pt required less physical assistance to complete sit <> stand and short distance functional mobility using RW. Pt demonstrated limited AROM L shoulder AG impacting pt's I w/ ADLs. Continue to recommend level II rehab resource intensity when medically stable for discharge from acute care. Will continue to follow   Plan   Treatment Interventions ADL retraining;Functional transfer training;Endurance training;UE strengthening/ROM;Patient/family training;Equipment evaluation/education;Compensatory technique education;Continued evaluation;Energy conservation;Activityengagement   Goal Expiration Date 08/17/24   OT Treatment Day 1  (Monday 8/5/24)   OT Frequency " 3-5x/wk   Discharge Recommendation   Rehab Resource Intensity Level, OT II (Moderate Resource Intensity)   AM-PAC Daily Activity Inpatient   Lower Body Dressing 2   Bathing 2   Toileting 2   Upper Body Dressing 3   Grooming 3   Eating 3   Daily Activity Raw Score 15   Daily Activity Standardized Score (Calc for Raw Score >=11) 34.69   AM-PAC Applied Cognition Inpatient   Following a Speech/Presentation 3   Understanding Ordinary Conversation 3   Taking Medications 2   Remembering Where Things Are Placed or Put Away 3   Remembering List of 4-5 Errands 3   Taking Care of Complicated Tasks 2   Applied Cognition Raw Score 16   Applied Cognition Standardized Score 35.03   Barthel Index   Feeding 5   Bathing 0   Grooming Score 0   Dressing Score 5   Bladder Score 0   Bowels Score 0   Toilet Use Score 5   Transfers (Bed/Chair) Score 5   Mobility (Level Surface) Score 0   Stairs Score 0   Barthel Index Score 20   End of Consult   Education Provided Yes   Patient Position at End of Consult Bedside chair;Bed/Chair alarm activated;All needs within reach   Nurse Communication Nurse aware of consult   Licensure   [place] License Number  Archana Rees, OTR/L [id number]       The patient's raw score on the AM-PAC Daily Activity Inpatient Short Form is 15. A raw score of less than 19 suggests the patient may benefit from discharge to post-acute rehabilitation services. Please refer to the recommendation of the Occupational Therapist for safe discharge planning.    Archana Rees OTR/L  [id number]  [id number]

## 2024-08-05 NOTE — PHYSICAL THERAPY NOTE
PHYSICAL THERAPY EVALUATION/TREATMENT     08/05/24 1040   PT Last Visit   PT Visit Date 08/05/24   Note Type   Note type Evaluation   Pain Assessment   Pain Assessment Tool 0-10   Pain Score No Pain   Restrictions/Precautions   Other Precautions Cognitive;Chair Alarm;Bed Alarm;Fall Risk;Contact/isolation;Airborne/isolation   Home Living   Type of Home Other (Comment)  (admitted from STR)   Home Equipment Walker   Additional Comments patient from home with spouse prior to STR. Now ambulating with assist for short distances with roller walker   Prior Function   Lives With Facility staff   Receives Help From   (facility staff)   General   Additional Pertinent History chart reviewed, patient admitted with UTI, falls, covid +. Patient now presents as generally weak and deconditioned and requiring assist of one person with a roller walker.   Family/Caregiver Present No   Cognition   Overall Cognitive Status Impaired   Arousal/Participation Cooperative   Attention Attends with cues to redirect   Orientation Level Oriented to person;Oriented to situation   Following Commands Follows one step commands with increased time or repetition   Subjective   Subjective patient states starting to feel better   RLE Assessment   RLE Assessment   (ROM WFL, strnegth 3+/5)   LLE Assessment   LLE Assessment   (ROM WFL, strnegth 3+/5)   Coordination   Movements are Fluid and Coordinated 0   Coordination and Movement Description decreased coordination with transfers and gait activity   Bed Mobility   Additional Comments patient sitting OOB in bedside chair upon arrival   Transfers   Sit to Stand 3  Moderate assistance   Additional items Assist x 1   Stand to Sit 3  Moderate assistance   Additional items Assist x 1   Ambulation/Elevation   Gait Assistance 3  Moderate assist   Additional items Assist x 1;Verbal cues;Tactile cues   Assistive Device Rolling walker   Distance 10 feet with change in direction.  Narrow base of support and  shortened stride length noted.  Loss of balance posteriorly at times. SPO2 at 94% on room air with gait activity   Balance   Static Sitting Fair   Dynamic Sitting Fair -   Static Standing Poor +   Dynamic Standing Poor   Ambulatory Poor   Activity Tolerance   Activity Tolerance Patient limited by fatigue;Treatment limited secondary to medical complications (Comment)  (Limited by weakness)   Nurse Made Aware Yes   Assessment   Prognosis Good   Problem List Decreased strength;Decreased range of motion;Decreased endurance;Impaired balance;Decreased mobility;Decreased coordination  (Shortness of breath with activity)   Assessment Patient seen for Physical Therapy evaluation. Patient admitted with <principal problem not specified>.  Comorbidities affecting patient's physical performance include: .  Personal factors affecting patient at time of initial evaluation include: ambulating with assistive device, inability to ambulate household distances, inability to navigate community distances, inability to navigate level surfaces without external assistance, inability to perform dynamic tasks in community, decreased cognition, limited home support, positive fall history, inability to perform physical activity, limited insight into impairments, inability to perform ADLS, and inability to perform IADLS . Prior to admission, patient was requiring assist for functional mobility with walker, requiring assist for ADLS, requiring assist for IADLS, in short term rehab, and having 24 hour care .  Please find objective findings from Physical Therapy assessment regarding body systems outlined above with impairments and limitations including weakness, decreased ROM, impaired balance, decreased endurance, impaired coordination, gait deviations, decreased activity tolerance, decreased functional mobility tolerance, fall risk, and SOB upon exertion.  The Barthel Index was used as a functional outcome tool presenting with a score of Barthel  Index Score: 35 today indicating marked limitations of functional mobility and ADLS.  Patient's clinical presentation is currently unstable/unpredictable as seen in patient's presentation of vital sign response, changing level of pain, varying levels of cognitive performance, increased fall risk, new onset of impairment of functional mobility, decreased endurance, and new onset of weakness. Pt would benefit from continued Physical Therapy treatment to address deficits as defined above and maximize level of functional mobility. As demonstrated by objective findings, the assigned level of complexity for this evaluation is high.The patient's [facility] Basic Mobility Inpatient Short Form Raw Score is 12. A Raw score of less than or equal to 16 suggests the patient may benefit from discharge to post-acute rehabilitation services. Please also refer to the recommendation of the Physical Therapist for safe discharge planning.   Goals   Patient Goals To get stronger and go home   STG Expiration Date 08/12/24   Short Term Goal #1 Transfers and gait with rolling walker with min assist   Short Term Goal #2 Gait endurance to 60 feet   LTG Expiration Date 08/19/24   Long Term Goal #1 Strength bilateral lower extremities 4 -/5   Long Term Goal #2 Transfers and gait with roller walker with supervision for functional household distances   Plan   Treatment/Interventions ADL retraining;Functional transfer training;LE strengthening/ROM;Therapeutic exercise;Endurance training;Cognitive reorientation;Patient/family training;Equipment eval/education;Bed mobility;Gait training;Compensatory technique education   PT Frequency Other (Comment)  (5 times per week)   Discharge Recommendation   Rehab Resource Intensity Level, PT II (Moderate Resource Intensity)   AM-PAC Basic Mobility Inpatient   Turning in Flat Bed Without Bedrails 3   Lying on Back to Sitting on Edge of Flat Bed Without Bedrails 2   Moving Bed to Chair 2   Standing Up From Chair  Using Arms 2   Walk in Room 2   Climb 3-5 Stairs With Railing 1   Basic Mobility Inpatient Raw Score 12   Basic Mobility Standardized Score 32.23   [facility] Highest Level Of Mobility   [facility] Goal 4: Move to chair/commode   -[facility] Achieved 6: Walk 10 steps or more   Barthel Index   Feeding 5   Bathing 0   Grooming Score 0   Dressing Score 5   Bladder Score 5   Bowels Score 10   Toilet Use Score 5   Transfers (Bed/Chair) Score 5   Mobility (Level Surface) Score 0   Stairs Score 0   Barthel Index Score 35   Additional Treatment Session   Start Time 1025   End Time 1040   Treatment Assessment s: Patient reports no pain only shortness of breath O: Bilateral lower extremity exercises completed as listed below A: Patient demonstrating improving transfers and gait ability and will benefit from continued physical therapy with progression as tolerated and increasing functional mobility with clinical staff as well   Exercises   Hamstring Stretch Sitting;5 reps;PROM;Bilateral   Hip Flexion Sitting;10 reps;Bilateral  (Alternating)   Hip Abduction Sitting;10 reps;Bilateral  (Alternating)   Knee AROM Long Arc Quad Sitting;10 reps;Bilateral  (Alternating)   Ankle Pumps Sitting;10 reps   Balance training  Sidestepping and backward stepping completed for balance and coordination   Licensure   [place] License Number  Ivana Maurice PT 4 [id number]

## 2024-08-05 NOTE — WOUND OSTOMY CARE
Progress Note - Wound   Edwin Elkins 89 y.o. male MRN: [id number]  Unit/Bed#: [address] [place] [address] Encounter: [id number]      Assessment:   This is a 89 year old male patient admitted on 8/2 with acute metabolic encephalopathy and Covid-19. Patient AAO to person only with wife at bedside. Patient had male external urinary device in place with no record of BM. He stood with assist of 2 persons for inspection of sacrobuttocks.    Assessment Findings:  1-Full thickness traumatic wounds to anterior forehead and bridge of nose with red granulation tissue and black eschar. Orders in place for protection.  2-Unstageable pressure injury to mid-sacrobuttock with 100% yellow slough - orders in place for wound care and for prevention.  3-Skin tears to bilateral arms - wound dressings done by Primary RN earlier. Orders in place for wound care.    Plan:  Skin care plans:  1-Cleanse wounds to anterior forehead and bridge of nose with NSS & pat dry. Paint wounds daily with 3M No Sting for protection.  2-Cleanse wounds to bilateral arms with NSS & pat dry. Apply Dermagran to wounds in a single layer cut to size of wounds. Cover with gauze or ABD, rolled gauze and tape. Change every other day & prn soilage/dislodgement.  3-Cleanse wound to mid-sacrobuttocks with NSS & pat dry. Apply thin layer of Triad paste 2x/day and prn soilage/dislodgement.  4-Apply Prevent barrier cream to bilateral heels BID and PRN  5-Float heels on 2 pillows to offload pressure so heels are not in contact with mattress or pillows.  6-Ehob pressure redistribution cushion in chair when out of bed. Avoid prolonged sitting due to sacrobuttock breakdown then offload back in bed turning side to side every 2 hours.  7-Moisturize skin daily with skin nourishing cream.  8-Turn/reposition q2h or when medically stable for pressure re-distribution on skin.   9-p-500 bed ordered     Full thickness wound to bridge of nose     Full thickness wound to anterior forehead    Wound  08/02/24 Pressure Injury Sacrum (Active)   Wound Image   08/05/24 1400   Wound Description Slough;Yellow 08/05/24 1400   Pressure Injury Stage Unstageable 08/05/24 1400   Marta-wound Assessment Erythema;Excoriated;  Hyperpigmented 08/05/24 1400   Wound Length (cm) 0.5 cm 08/05/24 1400   Wound Width (cm) 0.3 cm 08/05/24 1400   Wound Depth (cm) 0.1 cm 08/05/24 1400   Wound Surface Area (cm^2) 0.15 cm^2 08/05/24 1400   Wound Volume (cm^3) 0.015 cm^3 08/05/24 1400   Calculated Wound Volume (cm^3) 0.02 cm^3 08/05/24 1400   Drainage Amount None 08/05/24 1400   Treatments Cleansed 08/05/24 1400   Dressing Triad paste 08/05/24 1400   Dressing Changed New 08/05/24 1400   Dressing Status Intact 08/05/24 1400     Discussed assessment findings, and plan of care/recommendations with Shital CASTRO.    Wound care will follow along with patient throughout admission, please call or tiger text with questions and concerns.    Recommendations written as orders.  Rin Salmon MSN, RN, CWON

## 2024-08-06 ENCOUNTER — APPOINTMENT (INPATIENT)
Dept: RADIOLOGY | Facility: HOSPITAL | Age: 89
DRG: 689 | End: 2024-08-06
Payer: COMMERCIAL

## 2024-08-06 PROBLEM — E83.42 HYPOMAGNESEMIA: Status: RESOLVED | Noted: 2024-08-03 | Resolved: 2024-08-06

## 2024-08-06 PROBLEM — N30.00 ACUTE CYSTITIS: Status: RESOLVED | Noted: 2024-08-02 | Resolved: 2024-08-06

## 2024-08-06 LAB
ANION GAP SERPL CALCULATED.3IONS-SCNC: 5 MMOL/L (ref 4–13)
BUN SERPL-MCNC: 27 MG/DL (ref 5–25)
CALCIUM SERPL-MCNC: 7.3 MG/DL (ref 8.4–10.2)
CHLORIDE SERPL-SCNC: 105 MMOL/L (ref 96–108)
CO2 SERPL-SCNC: 27 MMOL/L (ref 21–32)
CREAT SERPL-MCNC: 0.56 MG/DL (ref 0.6–1.3)
ERYTHROCYTE [DISTWIDTH] IN BLOOD BY AUTOMATED COUNT: 13 % (ref 11.6–15.1)
GFR SERPL CREATININE-BSD FRML MDRD: 90 ML/MIN/1.73SQ M
GLUCOSE SERPL-MCNC: 144 MG/DL (ref 65–140)
HCT VFR BLD AUTO: 33 % (ref 36.5–49.3)
HGB BLD-MCNC: 11.1 G/DL (ref 12–17)
MAGNESIUM SERPL-MCNC: 2 MG/DL (ref 1.9–2.7)
MCH RBC QN AUTO: 32.6 PG (ref 26.8–34.3)
MCHC RBC AUTO-ENTMCNC: 33.6 G/DL (ref 31.4–37.4)
MCV RBC AUTO: 97 FL (ref 82–98)
PLATELET # BLD AUTO: 242 THOUSANDS/UL (ref 149–390)
PMV BLD AUTO: 9.4 FL (ref 8.9–12.7)
POTASSIUM SERPL-SCNC: 4.4 MMOL/L (ref 3.5–5.3)
RBC # BLD AUTO: 3.41 MILLION/UL (ref 3.88–5.62)
SODIUM SERPL-SCNC: 137 MMOL/L (ref 135–147)
WBC # BLD AUTO: 7.11 THOUSAND/UL (ref 4.31–10.16)

## 2024-08-06 PROCEDURE — 85027 COMPLETE CBC AUTOMATED: CPT | Performed by: STUDENT IN AN ORGANIZED HEALTH CARE EDUCATION/TRAINING PROGRAM

## 2024-08-06 PROCEDURE — 83735 ASSAY OF MAGNESIUM: CPT | Performed by: STUDENT IN AN ORGANIZED HEALTH CARE EDUCATION/TRAINING PROGRAM

## 2024-08-06 PROCEDURE — 99232 SBSQ HOSP IP/OBS MODERATE 35: CPT | Performed by: FAMILY MEDICINE

## 2024-08-06 PROCEDURE — 74177 CT ABD & PELVIS W/CONTRAST: CPT

## 2024-08-06 PROCEDURE — 80048 BASIC METABOLIC PNL TOTAL CA: CPT | Performed by: STUDENT IN AN ORGANIZED HEALTH CARE EDUCATION/TRAINING PROGRAM

## 2024-08-06 RX ORDER — METHYLPREDNISOLONE SODIUM SUCCINATE 40 MG/ML
40 INJECTION, POWDER, LYOPHILIZED, FOR SOLUTION INTRAMUSCULAR; INTRAVENOUS EVERY 12 HOURS SCHEDULED
Status: DISCONTINUED | OUTPATIENT
Start: 2024-08-06 | End: 2024-08-07

## 2024-08-06 RX ADMIN — PRAMIPEXOLE DIHYDROCHLORIDE 0.12 MG: 0.25 TABLET ORAL at 17:05

## 2024-08-06 RX ADMIN — CARBIDOPA AND LEVODOPA 1 TABLET: 25; 100 TABLET ORAL at 20:08

## 2024-08-06 RX ADMIN — METHYLPREDNISOLONE SODIUM SUCCINATE 60 MG: 125 INJECTION, POWDER, FOR SOLUTION INTRAMUSCULAR; INTRAVENOUS at 05:51

## 2024-08-06 RX ADMIN — CARBIDOPA AND LEVODOPA 1 TABLET: 25; 100 TABLET ORAL at 08:36

## 2024-08-06 RX ADMIN — DORZOLAMIDE HYDROCHLORIDE 1 DROP: 20 SOLUTION OPHTHALMIC at 17:05

## 2024-08-06 RX ADMIN — IOHEXOL 100 ML: 350 INJECTION, SOLUTION INTRAVENOUS at 09:16

## 2024-08-06 RX ADMIN — HEPARIN SODIUM 5000 UNITS: 5000 INJECTION INTRAVENOUS; SUBCUTANEOUS at 21:37

## 2024-08-06 RX ADMIN — METHYLPREDNISOLONE SODIUM SUCCINATE 40 MG: 40 INJECTION, POWDER, FOR SOLUTION INTRAMUSCULAR; INTRAVENOUS at 20:07

## 2024-08-06 RX ADMIN — DORZOLAMIDE HYDROCHLORIDE 1 DROP: 20 SOLUTION OPHTHALMIC at 08:36

## 2024-08-06 RX ADMIN — HEPARIN SODIUM 5000 UNITS: 5000 INJECTION INTRAVENOUS; SUBCUTANEOUS at 14:36

## 2024-08-06 RX ADMIN — CEFTRIAXONE 1000 MG: 1 INJECTION, SOLUTION INTRAVENOUS at 12:13

## 2024-08-06 RX ADMIN — PRAMIPEXOLE DIHYDROCHLORIDE 0.12 MG: 0.25 TABLET ORAL at 20:07

## 2024-08-06 RX ADMIN — HEPARIN SODIUM 5000 UNITS: 5000 INJECTION INTRAVENOUS; SUBCUTANEOUS at 05:51

## 2024-08-06 RX ADMIN — PRAMIPEXOLE DIHYDROCHLORIDE 0.12 MG: 0.25 TABLET ORAL at 08:36

## 2024-08-06 RX ADMIN — TAMSULOSIN HYDROCHLORIDE 0.4 MG: 0.4 CAPSULE ORAL at 17:05

## 2024-08-06 RX ADMIN — DORZOLAMIDE HYDROCHLORIDE 1 DROP: 20 SOLUTION OPHTHALMIC at 20:08

## 2024-08-06 RX ADMIN — CARBIDOPA AND LEVODOPA 1 TABLET: 25; 100 TABLET ORAL at 17:05

## 2024-08-06 NOTE — CASE MANAGEMENT
[facility] has received APPROVED authorization.  Insurance:   [employer]  Authorization received for: SNF  Facility: [facility]   Authorization #: [id number]   Start of Care: 8/6  Next Review Date: 8/18  Continued Stay Care Coordinator: none given  Submit next review to: [phone]     Care Manager notified: Artis BAKRER group email    Please reach out to CM for updates on any clinical information.

## 2024-08-06 NOTE — CASE MANAGEMENT
Case Management Discharge Planning Note    Patient name Edwin Elkins  Location [address] [place] [address]/[address] [place] [address]-* MRN [id number]  : 1932 Date 2024       Current Admission Date: 2024  Current Admission Diagnosis:Frequent falls   Patient Active Problem List    Diagnosis Date Noted Date Diagnosed    Hypomagnesemia 2024     Frequent falls 2024     Acute hypoxic respiratory failure (HCC) 2024     Acute cystitis 2024     Acute metabolic encephalopathy 2024     Perforated gallbladder s/p IR drain placement 2024     Acute cholecystitis due to biliary calculus 2024     Extrahepatic biloma 2024     Parkinson's disease with dyskinesia 2024     Moderate protein-calorie malnutrition (HCC) 2024     Venous insufficiency of both lower extremities 2022     Chronic deep vein thrombosis, RIGHT LE (DVT) (HCC) 2022       LOS (days): 4  Geometric Mean LOS (GMLOS) (days): 3.9  Days to GMLOS:0.1     OBJECTIVE:  Risk of Unplanned Readmission Score: 17.7         Current admission status: Inpatient   Preferred Pharmacy:   [id number] [address] [place] [address]  Phone: [phone] Fax: [phone]    Primary Care Provider: Nithin Greenberg MD    Primary Insurance: [employer]O [employer]ONSO  Secondary Insurance:     DISCHARGE DETAILS:                                                                                                               Facility Insurance Auth Number: [id number]

## 2024-08-06 NOTE — DISCHARGE INSTR - OTHER ORDERS
Plan:  Skin care plans:    1-Cleanse wounds to anterior forehead and bridge of nose with normal saline & pat dry. Paint wounds daily with 3M No Sting for protection.  2-Cleanse wounds to bilateral arms with normal saline & pat dry. Apply Dermagran to wounds in a single layer cut to size of wounds. Cover with gauze or ABD, rolled gauze and tape. Change every other day & as needed for soilage/dislodgement.  3-Cleanse wound to mid-sacrobuttocks with normal saline or mild soap and water & pat dry. Apply thin layer of Triad paste 2x/day and as needed for soilage/dislodgement.  4-Apply Prevent barrier cream or equivalent to bilateral heels 2x/day and as needed.  5-Float heels on 2 pillows to offload pressure so heels are not in contact with mattress or pillows.  6-Use pressure redistribution cushion in chair when out of bed. Avoid prolonged sitting due to sacrobuttock breakdown then offload back in bed turning side to side every 2 hours.  7-Moisturize skin daily with skin nourishing cream.  8-Turn/reposition every 2 hrs for pressure re-distribution on skin.   9-Would recommend low air-loss pressure redistribution mattress.

## 2024-08-07 VITALS
HEART RATE: 73 BPM | SYSTOLIC BLOOD PRESSURE: 125 MMHG | WEIGHT: 129.41 LBS | DIASTOLIC BLOOD PRESSURE: 61 MMHG | OXYGEN SATURATION: 98 % | RESPIRATION RATE: 18 BRPM | BODY MASS INDEX: 18.57 KG/M2 | TEMPERATURE: 96.9 F

## 2024-08-07 PROBLEM — G93.41 ACUTE METABOLIC ENCEPHALOPATHY: Status: RESOLVED | Noted: 2024-08-02 | Resolved: 2024-08-07

## 2024-08-07 PROBLEM — J96.01 ACUTE HYPOXIC RESPIRATORY FAILURE (HCC): Status: RESOLVED | Noted: 2024-08-02 | Resolved: 2024-08-07

## 2024-08-07 LAB
BACTERIA BLD CULT: NORMAL
BACTERIA BLD CULT: NORMAL

## 2024-08-07 PROCEDURE — 99239 HOSP IP/OBS DSCHRG MGMT >30: CPT | Performed by: FAMILY MEDICINE

## 2024-08-07 PROCEDURE — 99231 SBSQ HOSP IP/OBS SF/LOW 25: CPT | Performed by: SURGERY

## 2024-08-07 PROCEDURE — 94760 N-INVAS EAR/PLS OXIMETRY 1: CPT

## 2024-08-07 RX ORDER — PREDNISONE 10 MG/1
TABLET ORAL
Start: 2024-08-08

## 2024-08-07 RX ORDER — IPRATROPIUM BROMIDE AND ALBUTEROL SULFATE 2.5; .5 MG/3ML; MG/3ML
3 SOLUTION RESPIRATORY (INHALATION) EVERY 6 HOURS PRN
Start: 2024-08-07

## 2024-08-07 RX ADMIN — DORZOLAMIDE HYDROCHLORIDE 1 DROP: 20 SOLUTION OPHTHALMIC at 16:46

## 2024-08-07 RX ADMIN — METHYLPREDNISOLONE SODIUM SUCCINATE 40 MG: 40 INJECTION, POWDER, FOR SOLUTION INTRAMUSCULAR; INTRAVENOUS at 09:02

## 2024-08-07 RX ADMIN — PRAMIPEXOLE DIHYDROCHLORIDE 0.12 MG: 0.25 TABLET ORAL at 09:02

## 2024-08-07 RX ADMIN — CARBIDOPA AND LEVODOPA 1 TABLET: 25; 100 TABLET ORAL at 16:46

## 2024-08-07 RX ADMIN — DORZOLAMIDE HYDROCHLORIDE 1 DROP: 20 SOLUTION OPHTHALMIC at 09:02

## 2024-08-07 RX ADMIN — PRAMIPEXOLE DIHYDROCHLORIDE 0.12 MG: 0.25 TABLET ORAL at 16:45

## 2024-08-07 RX ADMIN — HEPARIN SODIUM 5000 UNITS: 5000 INJECTION INTRAVENOUS; SUBCUTANEOUS at 14:40

## 2024-08-07 RX ADMIN — TAMSULOSIN HYDROCHLORIDE 0.4 MG: 0.4 CAPSULE ORAL at 16:45

## 2024-08-07 RX ADMIN — CARBIDOPA AND LEVODOPA 1 TABLET: 25; 100 TABLET ORAL at 09:02

## 2024-08-07 RX ADMIN — HEPARIN SODIUM 5000 UNITS: 5000 INJECTION INTRAVENOUS; SUBCUTANEOUS at 05:39

## 2024-08-07 NOTE — ASSESSMENT & PLAN NOTE
Occurred during prior admission.  Drain in place.  Per IR, continue monitoring drain output  Per surgery, CT scan was done which shows decrease in fluid collection status post percutaneous drainage with decompressed gallbladder.  Trace pelvic ascites noted.  3.6 cm complex left renal cyst noted  Outpatient follow-up

## 2024-08-07 NOTE — CASE MANAGEMENT
Case Management Discharge Planning Note    Patient name Edwin Elkins  Location [address] [address]/[address] [place] [address]-* MRN [id number]  : 1932 Date 2024       Current Admission Date: 2024  Current Admission Diagnosis:Frequent falls   Patient Active Problem List    Diagnosis Date Noted Date Diagnosed    Frequent falls 2024     Perforated gallbladder s/p IR drain placement 2024     Acute cholecystitis due to biliary calculus 2024     Extrahepatic biloma 2024     Parkinson's disease with dyskinesia 2024     Moderate protein-calorie malnutrition (HCC) 2024     Venous insufficiency of both lower extremities 2022     Chronic deep vein thrombosis, RIGHT LE (DVT) (HCC) 2022       LOS (days): 5  Geometric Mean LOS (GMLOS) (days): 3.9  Days to GMLOS:-1.2     OBJECTIVE:  Risk of Unplanned Readmission Score: 17.81       Current admission status: Inpatient   Preferred Pharmacy:   [facility] [id number] [facility]  [address] [facility] [place] [address] [address]  Phone: [phone] Fax: [phone]    Primary Care Provider: Nithin Greenberg MD    Primary Insurance: [facility]  Secondary Insurance:     DISCHARGE DETAILS:    Discharge planning discussed with:: Jazlyn Jeffrey and patient  Freedom of Choice: Yes    Comments - Freedom of Choice: Insurance authorization for STR at [facility] has been approved per the [facility]  Authorization details were forwarded to facility via AIDIN message.  SW requested [facility] transport in [facility] and pickup is scheduled for 1630.  Jazlyn Jeffrey was notified of pickup time by nursing and facility was notified in AIDIN.       Were Treatment Team discharge recommendations reviewed with patient/caregiver?: Yes  Did patient/caregiver verbalize understanding of patient care needs?: N/A- going to facility  Were patient/caregiver advised of the risks associated with not following Treatment Team  discharge recommendations?: Yes    Contacts  Patient Contacts: Rachele Elkins (spouse)  Relationship to Patient:: Family  Contact Method: Phone  Phone Number: [phone]    Requested Home Health Care         Is the patient interested in HHC at discharge?: No    DME Referral Provided  Referral made for DME?: No    Other Referral/Resources/Interventions Provided:  Interventions: Short Term Rehab, Transportation    Would you like to participate in our [facility] service program?  : No - Declined    Treatment Team Recommendation: Short Term Rehab  Discharge Destination Plan:: Short Term Rehab  Transport at Discharge : Wheelchair van  Dispatcher Contacted: Yes  Number/Name of Dispatcher: [facility] via [facility]  Transported by (Company and Unit #): [employer]  ETA of Transport (Date): 08/07/24  ETA of Transport (Time): 1630         IMM Given (Date):: 08/07/24  IMM Given to:: Patient (IMM reviewed with patient at bedside and he verbalized acknowledgement.  Patient is in agreement with discharge determination.  Copy given to patient and copy placed in scan bin for chart.)        Accepting Facility Name, City & State : [facility]  Receiving Facility/Agency Phone Number: [phone]

## 2024-08-07 NOTE — DISCHARGE SUMMARY
[facility]  Discharge- Edwin Elkins 12/2/1932, 89 y.o. male MRN: [id number]  Unit/Bed#: [address] [place] [address] Encounter: [id number]  Primary Care Provider: Nithin Greenberg MD   Date and time admitted to hospital: 8/2/2024  8:36 AM    * Frequent falls  Assessment & Plan  Likely related to his Parkinson's Disease, deconditioning, recent COVID infection  CT head is negative for acute process    Acute hypoxic respiratory failure (HCC)-resolved as of 8/7/2024  Assessment & Plan  Pt recently completed therapy for COVID infection with Paxlovid.    CXR with no obvious infiltrate noted.    Was requiring oxygen previously but now stable on room air   Started on IV Solumedrol which was tapered down and will transition him to prednisone taper on discharge    Perforated gallbladder s/p IR drain placement  Assessment & Plan  Occurred during prior admission.  Drain in place.  Per IR, continue monitoring drain output even on discharge  Per surgery, CT scan was done which shows decrease in fluid collection status post percutaneous drainage with decompressed gallbladder.  Trace pelvic ascites noted.  3.6 cm complex left renal cyst noted  Outpatient follow-up with IR, surgery    Acute metabolic encephalopathy-resolved as of 8/7/2024  Assessment & Plan  Differential includes infection related (possible UTI and recent COVID)   CT head is negative for acute process.      Parkinson's disease with dyskinesia  Assessment & Plan  Continue Sinemet and Pramipexole.    Continue fall precautions.   PT/OT -STR on discharge.    Hypomagnesemia-resolved as of 8/6/2024  Assessment & Plan  Resolved status post repletion      Hyponatremia-resolved as of 8/5/2024  Assessment & Plan  Possibly due to dehydration from poor po intake.  Now resolved     Acute cystitis-resolved as of 8/6/2024  Assessment & Plan  Discontinue IV Ceftriaxone, has received 5 days of this      Medical Problems       Resolved Problems  Date Reviewed:  8/7/2024            Resolved    Acute cystitis 8/6/2024     Resolved by  Sudha Mejia DO    Hyponatremia 8/5/2024     Resolved by  Kathy Davison MD    Hypomagnesemia 8/6/2024     Resolved by  Sudha Mejia DO        Discharging Physician / Practitioner: Sudha Mejia DO  PCP: Nithin Greenberg MD  Admission Date:   Admission Orders (From admission, onward)       Ordered        08/02/24 1215  INPATIENT ADMISSION  Once            08/02/24 1215  Inpatient Admission  Once                          Discharge Date: 08/07/24    Consultations During Hospital Stay:  IR, surgery    Procedures Performed:   none    Significant Findings / Test Results:   See above    Incidental Findings:   Left renal cyst  I reviewed the above mentioned incidental findings with the patient and/or family and they expressed understanding.    Test Results Pending at Discharge (will require follow up):   none     Outpatient Tests Requested:  none    Complications:  none    Reason for Admission: Frequent falls    Hospital Course:   Edwin Elkins is a 89 y.o. male patient who originally presented to the hospital on 8/2/2024 due to Confusion, falls.  He was recently treated for COVID infection with Paxlovid.  Day prior to admission he had a fall and then another fall on the day of admission.  Patient was admitted initially requiring oxygen.  Treated with IV steroids.  Patient was also seen by surgery and evaluated by IR.  Continue with drains for now and monitor output from drains.  Cleared by all consultants involved in his care prior to discharge.  Discharge plan discussed with patient    Please see above list of diagnoses and related plan for additional information.     Condition at Discharge: stable    Discharge Day Visit / Exam:   Subjective: Patient denies any shortness of breath, abdominal pain    Vitals: Blood Pressure: 135/69 (08/07/24 0801)  Pulse: 65 (08/07/24 0818)  Temperature: (!) 96.9 °F (36.1 °C) (08/06/24 2203)  Temp  Source: Temporal (08/05/24 0755)  Respirations: 18 (08/07/24 0818)  Weight - Scale: 58.7 kg (129 lb 6.6 oz) (08/07/24 0600)  SpO2: 94 % (08/07/24 0818)  Exam:   Physical Exam  Vitals reviewed.   Constitutional:       General: He is not in acute distress.     Appearance: He is ill-appearing (chronically). He is not toxic-appearing.   HENT:      Head: Normocephalic and atraumatic.   Eyes:      Conjunctiva/sclera: Conjunctivae normal.   Cardiovascular:      Rate and Rhythm: Normal rate and regular rhythm.   Pulmonary:      Effort: Pulmonary effort is normal. No respiratory distress.      Breath sounds: Normal breath sounds. No wheezing or rales.   Abdominal:      General: Bowel sounds are normal. There is no distension.      Palpations: Abdomen is soft.      Tenderness: There is no abdominal tenderness.      Comments: KRUNAL drain and another drain in place   Neurological:      Mental Status: He is alert.          Discussion with Family: Attempted to update  (wife) via phone. Left voicemail.     Discharge instructions/Information to patient and family:   See after visit summary for information provided to patient and family.      Provisions for Follow-Up Care:  See after visit summary for information related to follow-up care and any pertinent home health orders.      Mobility at time of Discharge:   Basic Mobility Inpatient Raw Score: 12  JH-HLM Goal: 4: Move to chair/commode  JH-HLM Achieved: 4: Move to chair/commode  HLM Goal achieved. Continue to encourage appropriate mobility.     Disposition:   Other Skilled Nursing Facility at [facility]    Planned Readmission: no     Discharge Statement:  I spent > 30 minutes discharging the patient. This time was spent on the day of discharge. I had direct contact with the patient on the day of discharge. Greater than 50% of the total time was spent examining patient, answering all patient questions, arranging and discussing plan of care with patient as  well as directly providing post-discharge instructions.  Additional time then spent on discharge activities.    Discharge Medications:  See after visit summary for reconciled discharge medications provided to patient and/or family.      **Please Note: This note may have been constructed using a voice recognition system**

## 2024-08-07 NOTE — INCIDENTAL FINDINGS
The following findings require follow up:  Radiographic finding   Finding: Left renal cyst    Please notify the following clinician to assist with the follow up:   Your primary care doctor    Incidental finding results were discussed with the wife (left VM) by Sudha Mejia DO on 08/07/24.

## 2024-08-07 NOTE — ASSESSMENT & PLAN NOTE
Differential includes infection related (possible UTI and recent COVID)   CT head is negative for acute process.

## 2024-08-07 NOTE — ASSESSMENT & PLAN NOTE
Occurred during prior admission.  Drain in place.  Per IR, continue monitoring drain output even on discharge  Per surgery, CT scan was done which shows decrease in fluid collection status post percutaneous drainage with decompressed gallbladder.  Trace pelvic ascites noted.  3.6 cm complex left renal cyst noted  Outpatient follow-up with IR, surgery

## 2024-08-07 NOTE — NURSING NOTE
Report called to Stormy CASTRO at [facility] at 6165. Patient left with transport at 1650.

## 2024-08-07 NOTE — ASSESSMENT & PLAN NOTE
Pt recently completed therapy for COVID infection with Paxlovid.    CXR with no obvious infiltrate noted.    Was requiring oxygen previously but now stable on room air   Started on IV Solumedrol which was tapered down and will transition him to prednisone taper on discharge

## 2024-08-07 NOTE — ASSESSMENT & PLAN NOTE
Likely related to his Parkinson's Disease, deconditioning, recent COVID infection  CT head is negative for acute process

## 2024-08-07 NOTE — PROGRESS NOTES
Progress Note - General Surgery   Edwin Elkins 89 y.o. male MRN: [id number]  Unit/Bed#: [address] [place] [address] Encounter: [id number]    Assessment:  89-year-old male with past medical history of Parkinson's disease, covid+, hx of perforated cholecystitis s/p cholecystostomy tube and perhepatic drain placement 07/11 presenting with confusion and fall. Sepsis secondary to UTI.     Ct a/p:  nterval decrease in size of a perihepatic rim-enhancing fluid collection status post percutaneous drainage now measuring 7.4 x 1.5 x 5.3 cm, previously 10.4 x 2.6 x 10.1 cm when remeasured in a similar fashion. Decompressed gallbladder status post   cholecystostomy tube placement.     Cholecystomy tube: 175mL. Bilious.   Perihepatic drain: purulence.      Plan:  Continue cholecystostomy tube and perihepatic drain.   IR for drain check.   Follow up with Dr. Ledbetter as outpatient.     Subjective/Objective       Subjective: Patient was seen and examined bedside. Patient reports no acute changes through the night. Denies abdominal pain, nausea, vomiting. Tolerating diet.     Objective:     Blood pressure 135/69, pulse 65, temperature (!) 96.9 °F (36.1 °C), resp. rate 18, weight 58.7 kg (129 lb 6.6 oz), SpO2 94%.,Body mass index is 18.57 kg/m².      Intake/Output Summary (Last 24 hours) at 8/7/2024 0826  Last data filed at 8/6/2024 2001  Gross per 24 hour   Intake 240 ml   Output 275 ml   Net -35 ml       Invasive Devices       Peripheral Intravenous Line  Duration             Peripheral IV 08/02/24 Right;Ventral (anterior) Forearm 4 days              Drain  Duration             Abscess Drain Abdomen 26 days    Cholecystostomy Tube 26 days    External Urinary Catheter Small <1 day                    Physical Exam: /69   Pulse 65   Temp (!) 96.9 °F (36.1 °C)   Resp 18   Wt 58.7 kg (129 lb 6.6 oz)   SpO2 94%   BMI 18.57 kg/m²   General appearance: alert  Head: Normocephalic, without obvious abnormality, atraumatic  Lungs:  normal  "effort, no respiratory distress.   Heart: regular rate.   Abdomen: soft, non-tender; bowel sounds normal; no masses,  no organomegaly drains intact.     Lab, Imaging and other studies:I have personally reviewed pertinent lab results.  , CBC: No results found for: \"WBC\", \"HGB\", \"HCT\", \"MCV\", \"PLT\", \"ADJUSTEDWBC\", \"RBC\", \"MCH\", \"MCHC\", \"RDW\", \"MPV\", \"NRBC\", CMP: No results found for: \"SODIUM\", \"K\", \"CL\", \"CO2\", \"ANIONGAP\", \"BUN\", \"CREATININE\", \"GLUCOSE\", \"CALCIUM\", \"AST\", \"ALT\", \"ALKPHOS\", \"PROT\", \"BILITOT\", \"EGFR\"  VTE Pharmacologic Prophylaxis: Heparin  VTE Mechanical Prophylaxis: sequential compression device  "

## 2024-08-07 NOTE — ASSESSMENT & PLAN NOTE
Pt recently completed therapy for COVID infection with Paxlovid.    CXR with no obvious infiltrate noted.    Was requiring oxygen previously but now stable on room air   Decrease IV Solumedrol to 40 mg every 12 hours along with nebs

## 2024-08-08 ENCOUNTER — HOSPITAL ENCOUNTER (OUTPATIENT)
Dept: NON INVASIVE DIAGNOSTICS | Facility: HOSPITAL | Age: 89
Discharge: HOME | End: 2024-08-08
Payer: COMMERCIAL

## 2024-08-08 DIAGNOSIS — K66.8: Primary | ICD-10-CM

## 2024-08-08 DIAGNOSIS — K82.A2 CHOLECYSTITIS WITH PERFORATION OF GALLBLADDER: ICD-10-CM

## 2024-08-08 PROCEDURE — 76080 X-RAY EXAM OF FISTULA: CPT | Performed by: STUDENT IN AN ORGANIZED HEALTH CARE EDUCATION/TRAINING PROGRAM

## 2024-08-08 PROCEDURE — 49424 ASSESS CYST CONTRAST INJECT: CPT | Performed by: STUDENT IN AN ORGANIZED HEALTH CARE EDUCATION/TRAINING PROGRAM

## 2024-08-08 PROCEDURE — 76080 X-RAY EXAM OF FISTULA: CPT

## 2024-08-08 PROCEDURE — 47531 INJECTION FOR CHOLANGIOGRAM: CPT | Performed by: STUDENT IN AN ORGANIZED HEALTH CARE EDUCATION/TRAINING PROGRAM

## 2024-08-08 PROCEDURE — 49424 ASSESS CYST CONTRAST INJECT: CPT

## 2024-08-08 RX ADMIN — IOHEXOL 8 ML: 350 INJECTION, SOLUTION INTRAVENOUS at 12:15

## 2024-08-08 NOTE — SEDATION DOCUMENTATION
Pt arrived via wheelchair for rosalba tube and heptic abscess drain check in no apparent distress. Tubes will remain in place per MD Del Rosario. Next tube check 3-4 weeks

## 2024-08-08 NOTE — DISCHARGE INSTRUCTIONS
"     TUBE CARE INSTRUCTIONS    Care after your procedure:    Resume your normal diet. Small sips of flat soda will help with nausea.    1. The properly functioning catheter should be forward flushed once (1x) daily with 10ml of normal saline using clean technique. You will be given a prescription for flushes.   To flush the tube, clean both connections with alcohol swab.Twist off the drainage bag/ bulb  tubing and twist the saline syringe into the drainage tube and flush. Remove the syringe and twist the drainage bag / bulb tubing tubing back on.    2. The drainage bag/bulb may be emptied as necessary. Keep a record of the amount of fluid you drain from your tube. This should be done with clean technique as well.     3. A fresh dressing should be applied daily over the tube insertion site.     4. As the tube is secured to the skin with only a suture,try not to pull on your tube. Tub baths are not permitted. Showers are permitted if the patient's skin entry site is prevented from getting wet. Similarly, washcloth \"baths\" are acceptable.     Contact Interventional Radiology at [phone] ([facility] PATIENTS: Contact Interventional Radiology at [phone]) ([facility] PATIENTS: Contact Interventional Radiology at [phone]) if:    1. Leakage or large amounts of liquid around the catheter.    2. Fever of 101 degrees lasting several hours without other obvious cause (such as sore throat, flu, etc).    3. Persistent nausea or vomiting.    4. Diminished drainage, which may be associated with pressure or pain. Or when the     drainage from your tube is less than 10mls for 48 hours.    5. Catheter pulled back or falls out.      The following pharmacies carry the flush syringes.       [facility]                     [facility] [address]                     [address] [place] [address]                         [phone]  Zeferino Stafford " [place]  Phone [phone]            Phone [phone]                                                                                                       Yifan Rosas   [facility]             [facility]                                [phone]  [address] [place] [address] SANDRA FLORES  Phone [phone]            Phone [phone]                      [facility]                                                                                                          [phone]  [facility]  [address]  Zeferino FLORES                                                                               [facility]  Phone [phone]

## 2024-08-08 NOTE — BRIEF OP NOTE (RAD/CATH)
INTERVENTIONAL RADIOLOGY PROCEDURE NOTE    Date: 8/8/2024    Procedure:   Procedure Summary       Date: 08/08/24 Room / Location: [facility] Cardiac Cath Lab    Anesthesia Start:  Anesthesia Stop:     Procedure: IR DRAINAGE TUBE CHECK/CHANGE/REPOSITION/REINSERTION/UPSIZE Diagnosis:       Cholecystitis with perforation of gallbladder      (tube check)    Scheduled Providers:  Responsible Provider:     Anesthesia Type: Not recorded ASA Status: Not recorded            Preoperative diagnosis:   1. Cholecystitis with perforation of gallbladder         Postoperative diagnosis: Same.    Surgeon: Enrique Del Rosario MD     Assistant: None. No qualified resident was available.    Blood loss: None    Specimens: None     Findings:   Cholecystostomy tube output in the last day was reported as 50 mL.  Abscess drain output was reported at 25 mL in the last day.  Further records were not provided to evaluate trend in output.    Abscess drain output remains too high for drain removal.  Abscess drain check demonstrated appropriately positioned catheter and there was no resistance to flushing/aspiration.  The drain was therefore left in place.    Cholecystostomy tube check demonstrated patent tube with stones in the gallbladder including small subocclusive stones in the cystic duct.  Injected contrast passed through the CBD into the duodenum.  Tube was left in place.    Next tube evaluation and possible exchange in 3-4 weeks.    Complications: None immediate.    Anesthesia: none

## 2024-08-09 NOTE — UTILIZATION REVIEW
NOTIFICATION OF ADMISSION DISCHARGE   This is a Notification of Discharge from [facility]. Please be advised that this patient has been discharge from our facility. Below you will find the admission and discharge date and time including the patient’s disposition.   UTILIZATION REVIEW CONTACT:  Sarahi Calvert  Utilization   Network Utilization Review Department  Phone: [phone] x carefully listen to the prompts. All voicemails are confidential.  Email: [email or web address]     ADMISSION INFORMATION  PRESENTATION DATE: 8/2/2024  8:36 AM  OBERVATION ADMISSION DATE: N/A  INPATIENT ADMISSION DATE: 8/2/24 12:15 PM   DISCHARGE DATE: 8/7/2024  5:51 PM   DISPOSITION:Non [facility] SNF/TCU/SNU    Network Utilization Review Department  ATTENTION: Please call with any questions or concerns to [phone] and carefully listen to the prompts so that you are directed to the right person. All voicemails are confidential.   For Discharge needs, contact Care Management DC Support Team at [phone] opt. 2  Send all requests for admission clinical reviews, approved or denied determinations and any other requests to dedicated fax number below belonging to the campus where the patient is receiving treatment. List of dedicated fax numbers for the Facilities:  FACILITY NAME UR FAX NUMBER   ADMISSION DENIALS (Administrative/Medical Necessity) [phone]   DISCHARGE SUPPORT TEAM ([facility]) [phone]   PARENT CHILD HEALTH (Maternity/NICU/Pediatrics) [phone]   [facility] [phone]   [facility] [phone]   [facility] [phone]   [facility] [phone]   [facility] [phone]   [facility] [phone]   [facility] [phone]   [facility]  [phone]   [facility] [phone]   [facility] [phone]   [facility] [phone]   [facility] [phone]

## 2024-08-16 ENCOUNTER — TELEPHONE (OUTPATIENT)
Age: 89
End: 2024-08-16

## 2024-08-16 NOTE — TELEPHONE ENCOUNTER
Patient's son Clovis called. Patient has been in the hospital since 8/2/24 and they have had little conversation with Dr. Ledbetter. Son was asking to speak to someone. Made a warm transfer of the call to GLORIA Stephens.

## 2024-08-16 NOTE — TELEPHONE ENCOUNTER
FYI: Patient was recently discharged from [facility]. Inpatient from 7/11/24-7/16/24 for acute cholecystitis due to biliary colic. Cholecystotomy tube placed by IR at that time. [facility] currently at [facility] in [place]. Son, Clovis is calling to find out about Father. He states they did not receive any discharge instructions noting that his Father was to make follow up appointment with general surgeon. Has also not heard anything from [facility] where he is currently at. I offered new patient appointment with  as noted on AVS. Son declined at this time as Father is very weak. He will be following up with staff at [facility]. Son is aware of IR appointments for tube check on 8/30 and 10/10. Will call back at some point as indicated to schedule appointment.

## 2024-08-29 ENCOUNTER — HOSPITAL ENCOUNTER (OUTPATIENT)
Dept: NON INVASIVE DIAGNOSTICS | Facility: HOSPITAL | Age: 89
Discharge: HOME | End: 2024-08-29
Attending: STUDENT IN AN ORGANIZED HEALTH CARE EDUCATION/TRAINING PROGRAM
Payer: COMMERCIAL

## 2024-08-29 DIAGNOSIS — K66.8: ICD-10-CM

## 2024-08-29 DIAGNOSIS — K82.A2 CHOLECYSTITIS WITH PERFORATION OF GALLBLADDER: ICD-10-CM

## 2024-08-29 PROCEDURE — C1769 GUIDE WIRE: HCPCS

## 2024-08-29 PROCEDURE — 75984 XRAY CONTROL CATHETER CHANGE: CPT | Performed by: RADIOLOGY

## 2024-08-29 PROCEDURE — C1729 CATH, DRAINAGE: HCPCS

## 2024-08-29 PROCEDURE — 49423 EXCHANGE DRAINAGE CATHETER: CPT | Performed by: RADIOLOGY

## 2024-08-29 PROCEDURE — 47536 EXCHANGE BILIARY DRG CATH: CPT | Performed by: RADIOLOGY

## 2024-08-29 PROCEDURE — 49423 EXCHANGE DRAINAGE CATHETER: CPT

## 2024-08-29 PROCEDURE — 75984 XRAY CONTROL CATHETER CHANGE: CPT

## 2024-08-29 PROCEDURE — 47531 INJECTION FOR CHOLANGIOGRAM: CPT

## 2024-08-29 RX ORDER — LIDOCAINE WITH 8.4% SOD BICARB 0.9%(10ML)
SYRINGE (ML) INJECTION AS NEEDED
Status: COMPLETED | OUTPATIENT
Start: 2024-08-29 | End: 2024-08-29

## 2024-08-29 RX ADMIN — IOHEXOL 15 ML: 350 INJECTION, SOLUTION INTRAVENOUS at 13:37

## 2024-08-29 RX ADMIN — Medication 2 ML: at 13:22

## 2024-08-29 NOTE — BRIEF OP NOTE (RAD/CATH)
INTERVENTIONAL RADIOLOGY PROCEDURE NOTE    Date: 8/29/2024    Procedure:   Procedure Summary       Date: 08/29/24 Room / Location: [facility] Cardiac Cath Lab    Anesthesia Start:  Anesthesia Stop:     Procedure: IR DRAINAGE TUBE CHECK/CHANGE/REPOSITION/REINSERTION/UPSIZE Diagnosis:       Cholecystitis with perforation of gallbladder      Extrahepatic biloma      (Perihepatic abscess, cholecystostomy tube)    Scheduled Providers:  Responsible Provider:     Anesthesia Type: Not recorded ASA Status: Not recorded            Preoperative diagnosis:   1. Cholecystitis with perforation of gallbladder    2. Extrahepatic biloma         Postoperative diagnosis: Same.    Surgeon: Hong Crowell MD     Assistant: None. No qualified resident was available.    Blood loss: None    Specimens: None     Findings: Cholecystostomy tube replacement. Right perihepatic drain exchange.    Complications: None immediate.    Anesthesia: local

## 2024-08-29 NOTE — DISCHARGE INSTRUCTIONS
Cholecystostomy Tube Care     WHAT YOU NEED TO KNOW:   A cholecystostomy tube is a catheter (thin plastic tube) that is inserted through your skin and into your gallbladder. The cholecystostomy tube drains bile from your gallbladder into a collecting bag outside your body. You may need a cholecystostomy tube when something is blocking the normal flow of bile. A cholecystostomy tube may be used for a short or a long period of time. The cholecystostomy tube comes out of your abdomen, so you will need someone to help care for your cholecystostomy tube.    DISCHARGE INSTRUCTIONS:     Resume your normal diet. Small sips of flat soda will help with mild nausea.     How to clean the skin around the cholecystostomy tube and change the bandage:  Since the cholecystostomy tube comes out of your abdomen, you may not be able to care for it by yourself. Ask someone to follow the general directions below to check and care for your cholecystostomy tube.   Gather the items you will need.          Disposable (single use) under-pad, and a clean washcloth  Plain soap, warm water, and new medical gloves  Sterile gauze bandages  Clear adhesive dressing or medical tape  Skin barrier  Protective skin film  Trash bag  Remove the old bandage, and check the tube entry site.    Have the patient lie on his side with the cholecystostomy tube entry site facing up. Place the under-pad where it will catch drainage as you are working with the cholecystostomy tube.   Wash your hands with soap and water. Put on new medical gloves.  Gently remove the old bandage, without pulling on the tube. Do this by holding the skin beside the tube with one hand. With the other hand, gently remove sticky tape and the skin barrier by pulling in the same direction as hair growth. Do not touch the side of the bandage that is placed over or around the tube. Throw the bandage and skin barrier away in a trash bag.  Look for signs of infection, such as skin redness  and swelling. Report any skin changes to healthcare providers.  Clean the tube entry site.    Hold the tube in place to keep it from being pulled out while you are cleaning around it.  You will need to clean the area twice. For the first cleaning, wet a new gauze bandage with soap and water.  Begin at the entry site of the tube. Wipe the skin in circles, moving away from the entry site. Remove blood and any other material with the gauze. Do this as often as needed. Use a new gauze bandage each time you clean the area, moving away from the entry site.   For the second cleaning, wet a new gauze bandage with water. Begin at the entry site of the tube. Wipe the skin in circles, moving away from the entry site. Use a new gauze bandage each time you clean the area, moving away from the entry site.   Gently pat the skin with a clean washcloth to dry it.    Apply the skin barrier and bandages.    Roll up a bandage to make it thick, and place it under  the place where the tube enters the skin. Place it to support the tube, and stop it from kinking or bending. Tape the bandage in place, and apply more bandages if directed by a healthcare provider.   Bring the tubing forward to the front and tape it to the skin. Do not stretch the tube tight, because this may pull the cholecystostomy tube out.  How often to change the bandage.  Change the bandage around the tube, every other day. If your bandages  get dirty or wet, change them right away, and as often as needed. If your cholecystostomy tube is to be used for a long period of time, the tube needs to be changed every 2 to 3 months. Healthcare providers will tell you when you need to make an appointment to have your tube changed.     How to care for the bile drainage bag:   Ask if you need to measure and write down how much bile is in the bag before you empty it. Drain bile out of the drainage bag when it is ½ to ? full. Open the spout at the bottom of the bag to empty the bile  into the toilet.   You may need to detach the drainage bag from the cholecystostomy tube to change it.. If so, attach a new drainage bag tightly to the cholecystostomy tube.     How to prevent problems with your cholecystostomy tube:   Change bandages, directed.  This helps to prevent infection. Throw away or clean your drainage bag as directed by your healthcare provider.    Wipe the connecting ends of the drainage bag with alcohol before you reconnect the bag to the tube.  This helps prevent infection.     Keep the tube taped to your skin and connected to a drainage bag placed below the level of your gallbladders.  This helps prevent bile from abdomening up into your gallbladders. You may wear a small drainage bag strapped to your leg to let you move around more easily.    Check the catheter to be sure it is in place after you change your clothes or do other activities.  Do not wear tight clothing over the tube. Place the tubing over your thigh rather than under it when you are sitting down. Be sure that nothing is pulling on the cholecystostomy tube when you move around.    Change positions if you see little or no bile in your drainage bag.  Check to see if the bile tube is twisted or bent. Be sure that you are not sitting or lying on the tube. If there are no kinks and there is little or no bile in the drainage bag, tell your healthcare provider.    Flush out the tube as directed. Some tubes get flushed one time a day with 10 mls of NSS You will be given a prescription for the flushes.  To flush the cholecystostomy tube, clean both connections with alcohol swap. Twist off the drainage bag tube and twist the saline syringe into the cholecystostomy tube and flush briskly. Remove the syringe and twist the drainage bag tube abdomen into the cholecystostomy tube.  Keep the site covered while you shower.  Tape a piece of clear adhesive plastic over the dressing to keep it dry while you shower. Do not take tub  baths.    Contact Interventional Radiology at [phone] ([place] PATIENTS: Contact Interventional Radiology at [phone]) ([place] PATIENTS: Contact Interventional Radiology at [phone]) if:  The skin around the cholecystostomy tube is red, swollen, itches, or has a rash.   You have a fever.  You have lower abdomen or hip pain.  There are changes in how your bile looks or smells.  You have little or no bile draining from the cholecystostomy tube.   You have nausea and are vomiting.  The black lauren on your tube has moved, or the tube is longer than when it was put in.   You have questions or concerns about your condition or care.  The cholecystostomy tube comes out completely.   There is blood, pus, or a bad smell coming from the place where the tube enters your skin.  bile is leaking around the tube.        The following pharmacies carry the flush syringes.       [facility]                     [facility]                         [facility]       [address]                     [address] [facility]                    [phone]  [place]                       [place]  Phone [phone]            Phone [phone]                 [facility]                                                                                                   [phone]  [facility]             [facility]                             [address]                      [address] [facility]   [facility] [place]                                 [phone]  Phone [phone]            Phone [phone]    [facility]                                                                         [facility] [phone]  [address]  [place]   Phone [phone]             TUBE CARE INSTRUCTIONS    Care after your procedure:    Resume your normal diet. Small sips of flat soda will help with nausea.    1. The properly  "functioning catheter should be forward flushed once (1x) daily with 10ml of normal saline using clean technique. You will be given a prescription for flushes.   To flush the tube, clean both connections with alcohol swab.Twist off the drainage bag/ bulb  tubing and twist the saline syringe into the drainage tube and flush. Remove the syringe and twist the drainage bag / bulb tubing tubing back on.    2. The drainage bag/bulb may be emptied as necessary. Keep a record of the amount of fluid you drain from your tube. This should be done with clean technique as well.     3. A fresh dressing should be applied daily over the tube insertion site.     4. As the tube is secured to the skin with only a suture,try not to pull on your tube. Tub baths are not permitted. Showers are permitted if the patient's skin entry site is prevented from getting wet. Similarly, washcloth \"baths\" are acceptable.     Contact Interventional Radiology at [phone] ([place] PATIENTS: Contact Interventional Radiology at [phone]) ([place] PATIENTS: Contact Interventional Radiology at [phone]) if:    1. Leakage or large amounts of liquid around the catheter.    2. Fever of 101 degrees lasting several hours without other obvious cause (such as sore throat, flu, etc).    3. Persistent nausea or vomiting.    4. Diminished drainage, which may be associated with pressure or pain. Or when the     drainage from your tube is less than 10mls for 48 hours.    5. Catheter pulled back or falls out.      The following pharmacies carry the flush syringes.       [facility]                     [facility]                             [facility] [address]                         [phone]  [place]                       [place]  Phone [phone]            Phone [phone]                                                                                                       " Yifan Rosas   [facility]             [facility]                                [phone]  [address] [place]  Phone [phone]            Phone [phone]                      [facility]                                                                                                          [phone]  [facility]  [address]  [place]                                                                               [facility]  Phone [phone]

## 2024-08-29 NOTE — SEDATION DOCUMENTATION
Procedure ended. Tube check and exchange completed by Dr. Crowell. 10fr cholecystostomy tube and 10fr drainage tube exchanged by Dr. Crowell. Cholecystostomy tube to bag drainage, drainage tube to bulb suction. Dressings applied. Patient to return to IR in 2 weeks for next check

## 2024-08-31 ENCOUNTER — TELEPHONE (OUTPATIENT)
Dept: OTHER | Facility: OTHER | Age: 89
End: 2024-08-31

## 2024-08-31 NOTE — TELEPHONE ENCOUNTER
Scott goode called reporting pt has a leak in KRUNAL drain. She was told to reach out to the surgeon because the drain is leaking and would like a call back    On call notified via epic chat

## 2024-08-31 NOTE — TELEPHONE ENCOUNTER
Scott Rios is following up on her call from earlier today regarding patient's KRUNAL drain. He has a leak and is looking for advise on what to do. She can be reached at [phone]    On call notified via ESC

## 2024-09-11 ENCOUNTER — HOSPITAL ENCOUNTER (OUTPATIENT)
Dept: NON INVASIVE DIAGNOSTICS | Facility: HOSPITAL | Age: 89
Discharge: HOME | End: 2024-09-11
Payer: COMMERCIAL

## 2024-09-11 DIAGNOSIS — K66.8: ICD-10-CM

## 2024-09-11 PROCEDURE — 76080 X-RAY EXAM OF FISTULA: CPT | Performed by: RADIOLOGY

## 2024-09-11 PROCEDURE — 49424 ASSESS CYST CONTRAST INJECT: CPT | Performed by: RADIOLOGY

## 2024-09-11 PROCEDURE — 49424 ASSESS CYST CONTRAST INJECT: CPT

## 2024-09-11 PROCEDURE — 76080 X-RAY EXAM OF FISTULA: CPT

## 2024-09-11 RX ADMIN — IOHEXOL 3 ML: 350 INJECTION, SOLUTION INTRAVENOUS at 09:39

## 2024-09-11 NOTE — SEDATION DOCUMENTATION
Pt arrived in wheelchair from [facility] in no apparent distress. Abscess drain injected with contrast and evaluated by MD Bermudez. Tube resutured, education provided to pt, questions answered as offered. Dry dressing to site.

## 2024-09-11 NOTE — DISCHARGE INSTRUCTIONS
"     TUBE CARE INSTRUCTIONS    Care after your procedure:    Resume your normal diet. Small sips of flat soda will help with nausea.    1. The properly functioning catheter should be forward flushed once (1x) daily with 3ml of normal saline using clean technique. You will be given a prescription for flushes.   To flush the tube, clean both connections with alcohol swab.Twist off the drainage bag/ bulb  tubing and twist the saline syringe into the drainage tube and flush. Remove the syringe and twist the drainage bag / bulb tubing tubing back on.    2. The drainage bag/bulb may be emptied as necessary. Keep a record of the amount of fluid you drain from your tube. This should be done with clean technique as well.     3. A fresh dressing should be applied daily over the tube insertion site.     4. As the tube is secured to the skin with only a suture,try not to pull on your tube. Tub baths are not permitted. Showers are permitted if the patient's skin entry site is prevented from getting wet. Similarly, washcloth \"baths\" are acceptable.     Contact Interventional Radiology at [phone] ([place] PATIENTS: Contact Interventional Radiology at [phone]) ([place] PATIENTS: Contact Interventional Radiology at [phone]) if:    1. Leakage or large amounts of liquid around the catheter.    2. Fever of 101 degrees lasting several hours without other obvious cause (such as sore throat, flu, etc).    3. Persistent nausea or vomiting.    4. Diminished drainage, which may be associated with pressure or pain. Or when the     drainage from your tube is less than 10mls for 48 hours.    5. Catheter pulled back or falls out.      The following pharmacies carry the flush syringes.       [facility]                     [facility]                     [address] [place] [address]                         [phone]  [place] " [place]  Phone [phone]            Phone [phone]                                                                                                       Yifan Rosas   [facility]             [facility]                                [phone]  [address] [place] [address] [place]  Phone [phone]            Phone [phone]                      [facility]                                                                                                          [phone]  [facility]  [address]  [place]                                                                               [facility]  Phone [phone]

## 2024-09-11 NOTE — H&P
Interventional Radiology  History and Physical 9/11/2024     Edwin Elkins   12/2/1932   [id number]    Assessment/Plan:  89 year old male with history of perforated cholecystitis and perihepatic biloma s/p rosalba tube placement and biloma drainage catheter placement on 7/11/2024, followed by exchange of both catheters on 8/29/2024, returns for biloma drain check.    Problem List Items Addressed This Visit          Rheumatology    Extrahepatic biloma    Relevant Orders    IR drainage tube check/change/reposition/reinsertion/upsize          Subjective:     Patient ID: Edwin Elkins is a 89 y.o. male.    History of Present Illness  Patient with history of perforated cholecystitis and perihepatic biloma s/p rosalba tube placement and biloma drainage catheter placement on 7/11/2024, followed by exchange of both catheters on 8/29/2024, returns for biloma drain check.    Review of Systems      Past Medical History:   Diagnosis Date    Arthritis     Disease of thyroid gland     Glaucoma     Parkinson disease     Pressure injury of skin     Psychiatric disorder     Renal disorder         Past Surgical History:   Procedure Laterality Date    ABDOMINAL SURGERY      EYE SURGERY      GASTRECTOMY      IR CHOLECYSTOSTOMY TUBE PLACEMENT  07/11/2024    IR DRAINAGE TUBE CHECK/CHANGE/REPOSITION/REINSERTION/UPSIZE  07/26/2024    IR DRAINAGE TUBE CHECK/CHANGE/REPOSITION/REINSERTION/UPSIZE  8/8/2024    IR DRAINAGE TUBE CHECK/CHANGE/REPOSITION/REINSERTION/UPSIZE  8/29/2024        Social History     Tobacco Use   Smoking Status Former    Current packs/day: 0.50    Average packs/day: 0.5 packs/day for 73.7 years (36.8 ttl pk-yrs)    Types: Cigarettes    Start date: 1951   Smokeless Tobacco Never        Social History     Substance and Sexual Activity   Alcohol Use Not Currently        Social History     Substance and Sexual Activity   Drug Use Not Currently    Types: Other    Comment: Pt denies drug use        No Known Allergies    Current  Outpatient Medications   Medication Sig Dispense Refill    carbidopa-levodopa (SINEMET)  mg per tablet Take 1 tablet by mouth 3 (three) times a day      dorzolamide (TRUSOPT) 2 % ophthalmic solution Administer to both eyes 3 (three) times a day      ipratropium-albuterol (DUO-NEB) 0.5-2.5 mg/3 mL nebulizer solution Take 3 mL by nebulization every 6 (six) hours as needed for wheezing or shortness of breath      pramipexole (MIRAPEX) 0.125 mg tablet TAKE ONE TABLET BY MOUTH THREE TIMES A DAY (GENERIC FOR MIRAPLEX)      predniSONE 10 mg tablet 4 tabs X 2 days, then 3 tabs X 2 days, then 2 tabs X 2 days, then 1 tab X 2 days Do not start before August 8, 2024.      tamsulosin (FLOMAX) 0.4 mg Take 0.4 mg by mouth daily with dinner       No current facility-administered medications for this encounter.          Objective:    There were no vitals filed for this visit.     Physical Exam  Constitutional:       Appearance: Normal appearance.   Abdominal:      Comments: Cholecystostomy and perihepatic biloma drains present.           Lab Results   Component Value Date     (H) 08/02/2024      Lab Results   Component Value Date    WBC 7.11 08/06/2024    HGB 11.1 (L) 08/06/2024    HCT 33.0 (L) 08/06/2024    MCV 97 08/06/2024     08/06/2024     Lab Results   Component Value Date    INR 1.12 08/02/2024    INR 1.03 02/11/2022    PROTIME 14.9 08/02/2024    PROTIME 13.3 02/11/2022     Lab Results   Component Value Date    PTT 35 (H) 08/02/2024         I have personally reviewed pertinent imaging and laboratory results.     Code Status: Prior  Advance Directive and Living Will:      Power of :    POLST:      This text is generated with voice recognition software. There may be translation, syntax,  or grammatical errors. If you have any questions, please contact the dictating provider.

## 2024-09-11 NOTE — BRIEF OP NOTE (RAD/CATH)
INTERVENTIONAL RADIOLOGY PROCEDURE NOTE    Date: 9/11/2024    Procedure:   Procedure Summary       Date: 09/11/24 Room / Location: [facility] Cardiac Cath Lab    Anesthesia Start:  Anesthesia Stop:     Procedure: IR DRAINAGE TUBE CHECK/CHANGE/REPOSITION/REINSERTION/UPSIZE Diagnosis:       Extrahepatic biloma      (tube check)    Scheduled Providers:  Responsible Provider:     Anesthesia Type: Not recorded ASA Status: Not recorded            Preoperative diagnosis:   1. Extrahepatic biloma         Postoperative diagnosis: Same.    Surgeon: Amado Bermudez MD     Assistant: None. No qualified resident was available.    Blood loss: None    Specimens: None     Findings: Biloma drain check showed small residual cavity surrounding drain.  Drain kept in place to bulb suction drainage.  Follow up in 2 weeks for repeat drain check.  Biloma drain and cholecystostomy tube resutured to skin using 0 Prolene sutures.    Complications: None immediate.    Anesthesia: local

## 2024-10-01 ENCOUNTER — HOSPITAL ENCOUNTER (OUTPATIENT)
Dept: NON INVASIVE DIAGNOSTICS | Facility: HOSPITAL | Age: 89
Discharge: HOME | End: 2024-10-01
Attending: RADIOLOGY
Payer: COMMERCIAL

## 2024-10-01 DIAGNOSIS — K66.8: ICD-10-CM

## 2024-10-01 PROCEDURE — 49424 ASSESS CYST CONTRAST INJECT: CPT | Performed by: RADIOLOGY

## 2024-10-01 PROCEDURE — 76080 X-RAY EXAM OF FISTULA: CPT | Performed by: RADIOLOGY

## 2024-10-01 PROCEDURE — 49424 ASSESS CYST CONTRAST INJECT: CPT

## 2024-10-01 PROCEDURE — 76080 X-RAY EXAM OF FISTULA: CPT

## 2024-10-01 RX ADMIN — IOHEXOL 5 ML: 350 INJECTION, SOLUTION INTRAVENOUS at 10:33

## 2024-10-01 NOTE — BRIEF OP NOTE (RAD/CATH)
INTERVENTIONAL RADIOLOGY PROCEDURE NOTE    Date: 10/1/2024    Procedure:   Procedure Summary       Date: 10/01/24 Room / Location: [facility]iac Cath Lab    Anesthesia Start:  Anesthesia Stop:     Procedure: IR DRAINAGE TUBE CHECK/CHANGE/REPOSITION/REINSERTION/UPSIZE Diagnosis:       Extrahepatic biloma      (2 week perihepatic biloma drain check)    Scheduled Providers:  Responsible Provider:     Anesthesia Type: Not recorded ASA Status: Not recorded            Preoperative diagnosis:   1. Extrahepatic biloma         Postoperative diagnosis: Same.    Surgeon: Hong Crowell MD     Assistant: None. No qualified resident was available.    Blood loss: None    Specimens: None     Findings: Tube checks shows no significant residual cavity, drainage catheter removed.    Complications: None immediate.    Anesthesia: none

## 2024-10-01 NOTE — DISCHARGE INSTRUCTIONS
Drainage Tube Removal    Your drainage tube was removed today.    What you need know at home:   Keep a clean dry dressing at the tube site until the small opening closes. It will take twenty four to forty eight hours. Keep the site dry until it heals. A small amount of drainage on your dressing is normal. Resume your normal diet. Small sips of flat soda will help with any nausea.     Contact Interventional Radiology for any of the following:    You have pain, fever greater than 101, shaking chills.  If you have increased redness or swelling at the site.                                             Drainage Tube Removal    Your drainage tube was removed today.    What you need know at home:   Keep a clean dry dressing at the tube site until the small opening closes. It will take twenty four to forty eight hours. Keep the site dry until it heals. A small amount of drainage on your dressing is normal. Resume your normal diet. Small sips of flat soda will help with any nausea.     Contact Interventional Radiology for any of the following:    You have pain, fever greater than 101, shaking chills.  If you have increased redness or swelling at the site.   I the drainage from your site does not stop.  If the site drains pus or has a bad odor.     Contact Interventional Radiology at [phone]   ([place] PATIENTS: Contact Interventional Radiology at [phone]) ([place] PATIENTS: Contact Interventional Radiology at [phone]) if:      I the drainage from your site does not stop.  If the site drains pus or has a bad odor.     Contact Interventional Radiology at [phone]   ([place] PATIENTS: Contact Interventional Radiology at [phone]) ([place] PATIENTS: Contact Interventional Radiology at [phone]) if:

## 2024-10-01 NOTE — SEDATION DOCUMENTATION
Drain check complete. Drain removed. Patient tolerated check and removal well. Instructions in chart and sent with patient to receiving facility. Dressing to site.

## 2024-10-07 ENCOUNTER — RA CDI HCC (OUTPATIENT)
Dept: OTHER | Facility: HOSPITAL | Age: 89
End: 2024-10-07

## 2024-10-21 ENCOUNTER — HOSPITAL ENCOUNTER (EMERGENCY)
Facility: HOSPITAL | Age: 89
Discharge: HOME | End: 2024-10-21
Attending: EMERGENCY MEDICINE | Admitting: EMERGENCY MEDICINE
Payer: COMMERCIAL

## 2024-10-21 VITALS
RESPIRATION RATE: 17 BRPM | HEART RATE: 74 BPM | DIASTOLIC BLOOD PRESSURE: 60 MMHG | OXYGEN SATURATION: 94 % | TEMPERATURE: 97.7 F | SYSTOLIC BLOOD PRESSURE: 122 MMHG

## 2024-10-21 DIAGNOSIS — Z51.89 VISIT FOR WOUND CHECK: Primary | ICD-10-CM

## 2024-10-21 PROCEDURE — 99284 EMERGENCY DEPT VISIT MOD MDM: CPT | Performed by: EMERGENCY MEDICINE

## 2024-10-21 PROCEDURE — 99283 EMERGENCY DEPT VISIT LOW MDM: CPT

## 2024-10-21 NOTE — ED PROVIDER NOTES
Time reflects when diagnosis was documented in both MDM as applicable and the Disposition within this note       Time User Action Codes Description Comment    10/21/2024  4:20 PM Jamie Mcguire Add [Z51.89] Visit for wound check           ED Disposition       ED Disposition   Discharge    Condition   Stable    Date/Time   Mon Oct 21, 2024  4:20 PM    Comment   Edwin Elkins discharge to home/self care.                   Assessment & Plan       Medical Decision Making  Peritoneal Catheter draining biloma has been in since August.  Has not been draining much.  Patient is currently in hospice.  Patient and his family do not wish testing or imaging.  Case was discussed with surgery and IR and we will leave the catheter out without further intervention             Medications - No data to display    ED Risk Strat Scores                                               History of Present Illness       Chief Complaint   Patient presents with    Wound Drain Evaluation     Patient has a gall bladder drain that was accidentally removed just PTA, no other complaints       Past Medical History:   Diagnosis Date    Arthritis     Disease of thyroid gland     Glaucoma     Parkinson disease (HCC)     Pressure injury of skin     Psychiatric disorder     Renal disorder       Past Surgical History:   Procedure Laterality Date    ABDOMINAL SURGERY      EYE SURGERY      GASTRECTOMY      IR CHOLECYSTOSTOMY TUBE PLACEMENT  07/11/2024    IR DRAINAGE TUBE CHECK/CHANGE/REPOSITION/REINSERTION/UPSIZE  07/26/2024    IR DRAINAGE TUBE CHECK/CHANGE/REPOSITION/REINSERTION/UPSIZE  8/8/2024    IR DRAINAGE TUBE CHECK/CHANGE/REPOSITION/REINSERTION/UPSIZE  8/29/2024    IR DRAINAGE TUBE CHECK/CHANGE/REPOSITION/REINSERTION/UPSIZE  9/11/2024    IR DRAINAGE TUBE CHECK/CHANGE/REPOSITION/REINSERTION/UPSIZE  10/1/2024      Family History   Problem Relation Age of Onset    Cancer Mother     Heart disease Father       Social History     Tobacco Use    Smoking  status: Former     Current packs/day: 0.50     Average packs/day: 0.5 packs/day for 73.8 years (36.9 ttl pk-yrs)     Types: Cigarettes     Start date: 1951    Smokeless tobacco: Never   Vaping Use    Vaping status: Never Used   Substance Use Topics    Alcohol use: Not Currently    Drug use: Not Currently     Types: Other     Comment: Pt denies drug use      E-Cigarette/Vaping    E-Cigarette Use Never User       E-Cigarette/Vaping Substances    Nicotine No     THC No     CBD No     Flavoring No     Other No     Unknown No       I have reviewed and agree with the history as documented.     Patient has a history of intra-abdominal bile collection which was drained with an IR catheter since August.  Patient is currently in hospice care since leaving rehab.  Patient's wife states the biliary catheter has been not draining very much at all recently.  The patient has been able to eat without abdominal pain or vomiting.  Today the area catheter came out accidentally.        Review of Systems   Constitutional:  Negative for chills and fever.   HENT:  Negative for congestion and trouble swallowing.    Eyes:  Negative for visual disturbance.   Respiratory:  Negative for cough and shortness of breath.    Cardiovascular:  Negative for chest pain.   Gastrointestinal:  Negative for abdominal pain and vomiting.   Genitourinary:  Negative for dysuria.   Musculoskeletal:  Negative for back pain.   Skin:  Negative for rash.   Neurological:  Negative for weakness and headaches.   Hematological:  Does not bruise/bleed easily.   Psychiatric/Behavioral:  Negative for confusion.    All other systems reviewed and are negative.          Objective       ED Triage Vitals [10/21/24 1532]   Temperature Pulse Blood Pressure Respirations SpO2 Patient Position - Orthostatic VS   (!) 97.2 °F (36.2 °C) 75 136/63 16 96 % Lying      Temp Source Heart Rate Source BP Location FiO2 (%) Pain Score    Tympanic Monitor Left arm -- --      Vitals      Date  and Time Temp Pulse SpO2 Resp BP Pain Score FACES Pain Rating User   10/21/24 1532 97.2 °F (36.2 °C) 75 96 % 16 136/63 -- -- ATG            Physical Exam  Vitals and nursing note reviewed.   Constitutional:       Appearance: Normal appearance.   HENT:      Head: Normocephalic.      Right Ear: External ear normal.      Left Ear: External ear normal.      Nose: Nose normal.      Mouth/Throat:      Mouth: Mucous membranes are moist.   Eyes:      Conjunctiva/sclera: Conjunctivae normal.   Cardiovascular:      Rate and Rhythm: Normal rate.      Pulses: Normal pulses.   Pulmonary:      Effort: Pulmonary effort is normal.   Abdominal:      General: Abdomen is flat.      Palpations: Abdomen is soft.      Tenderness: There is no abdominal tenderness.   Musculoskeletal:         General: Normal range of motion.      Cervical back: Normal range of motion.   Skin:     General: Skin is warm and dry.      Capillary Refill: Capillary refill takes less than 2 seconds.   Neurological:      General: No focal deficit present.      Mental Status: He is alert.   Psychiatric:         Mood and Affect: Mood normal.         Results Reviewed       None            No orders to display       Procedures    ED Medication and Procedure Management   Prior to Admission Medications   Prescriptions Last Dose Informant Patient Reported? Taking?   carbidopa-levodopa (SINEMET)  mg per tablet  Self Yes No   Sig: Take 1 tablet by mouth 3 (three) times a day   dorzolamide (TRUSOPT) 2 % ophthalmic solution  Spouse/Significant Other Yes No   Sig: Administer to both eyes 3 (three) times a day   ipratropium-albuterol (DUO-NEB) 0.5-2.5 mg/3 mL nebulizer solution   No No   Sig: Take 3 mL by nebulization every 6 (six) hours as needed for wheezing or shortness of breath   pramipexole (MIRAPEX) 0.125 mg tablet  Spouse/Significant Other Yes No   Sig: TAKE ONE TABLET BY MOUTH THREE TIMES A DAY (GENERIC FOR MIRAPLEX)   predniSONE 10 mg tablet   No No   Si  tabs X 2 days, then 3 tabs X 2 days, then 2 tabs X 2 days, then 1 tab X 2 days Do not start before August 8, 2024.   tamsulosin (FLOMAX) 0.4 mg  Spouse/Significant Other Yes No   Sig: Take 0.4 mg by mouth daily with dinner      Facility-Administered Medications: None     Patient's Medications   Discharge Prescriptions    No medications on file     No discharge procedures on file.  ED SEPSIS DOCUMENTATION   Time reflects when diagnosis was documented in both MDM as applicable and the Disposition within this note       Time User Action Codes Description Comment    10/21/2024  4:20 PM Jamie Mcguire Add [Z51.89] Visit for wound check                  Jamie Mcguire MD  10/21/24 5677